# Patient Record
Sex: MALE | Race: WHITE | NOT HISPANIC OR LATINO | URBAN - METROPOLITAN AREA
[De-identification: names, ages, dates, MRNs, and addresses within clinical notes are randomized per-mention and may not be internally consistent; named-entity substitution may affect disease eponyms.]

---

## 2018-02-09 ENCOUNTER — EMERGENCY (EMERGENCY)
Facility: HOSPITAL | Age: 38
LOS: 1 days | Discharge: HOME | End: 2018-02-09
Admitting: EMERGENCY MEDICINE
Payer: COMMERCIAL

## 2018-02-09 VITALS
DIASTOLIC BLOOD PRESSURE: 65 MMHG | OXYGEN SATURATION: 100 % | SYSTOLIC BLOOD PRESSURE: 115 MMHG | HEART RATE: 96 BPM | RESPIRATION RATE: 18 BRPM

## 2018-02-09 VITALS
OXYGEN SATURATION: 100 % | TEMPERATURE: 100 F | SYSTOLIC BLOOD PRESSURE: 180 MMHG | HEART RATE: 130 BPM | DIASTOLIC BLOOD PRESSURE: 105 MMHG | RESPIRATION RATE: 18 BRPM

## 2018-02-09 DIAGNOSIS — F10.239 ALCOHOL DEPENDENCE WITH WITHDRAWAL, UNSPECIFIED: ICD-10-CM

## 2018-02-09 DIAGNOSIS — E87.6 HYPOKALEMIA: ICD-10-CM
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
## 2018-02-09 DIAGNOSIS — F17.200 NICOTINE DEPENDENCE, UNSPECIFIED, UNCOMPLICATED: ICD-10-CM

## 2018-02-09 LAB
ALBUMIN SERPL ELPH-MCNC: 3.2 G/DL — LOW (ref 3.4–5)
ALP SERPL-CCNC: 136 U/L — HIGH (ref 40–120)
ALT FLD-CCNC: 430 U/L — HIGH (ref 12–42)
ANION GAP SERPL CALC-SCNC: 9 MMOL/L — SIGNIFICANT CHANGE UP (ref 9–16)
AST SERPL-CCNC: 462 U/L — HIGH (ref 15–37)
BILIRUB SERPL-MCNC: 0.7 MG/DL — SIGNIFICANT CHANGE UP (ref 0.2–1.2)
BUN SERPL-MCNC: 19 MG/DL — SIGNIFICANT CHANGE UP (ref 7–23)
CALCIUM SERPL-MCNC: 9.2 MG/DL — SIGNIFICANT CHANGE UP (ref 8.5–10.5)
CHLORIDE SERPL-SCNC: 96 MMOL/L — SIGNIFICANT CHANGE UP (ref 96–108)
CO2 SERPL-SCNC: 33 MMOL/L — HIGH (ref 22–31)
CREAT SERPL-MCNC: 0.82 MG/DL — SIGNIFICANT CHANGE UP (ref 0.5–1.3)
ETHANOL SERPL-MCNC: <3 MG/DL — SIGNIFICANT CHANGE UP
GLUCOSE SERPL-MCNC: 116 MG/DL — HIGH (ref 70–99)
HCT VFR BLD CALC: 40 % — SIGNIFICANT CHANGE UP (ref 39–50)
HGB BLD-MCNC: 13.5 G/DL — SIGNIFICANT CHANGE UP (ref 13–17)
LIDOCAIN IGE QN: 98 U/L — SIGNIFICANT CHANGE UP (ref 73–393)
MAGNESIUM SERPL-MCNC: 2.1 MG/DL — SIGNIFICANT CHANGE UP (ref 1.6–2.6)
MAGNESIUM SERPL-MCNC: 2.1 MG/DL — SIGNIFICANT CHANGE UP (ref 1.6–2.6)
MCHC RBC-ENTMCNC: 27 PG — SIGNIFICANT CHANGE UP (ref 27–34)
MCHC RBC-ENTMCNC: 33.8 G/DL — SIGNIFICANT CHANGE UP (ref 32–36)
MCV RBC AUTO: 80 FL — SIGNIFICANT CHANGE UP (ref 80–100)
PCP SPEC-MCNC: SIGNIFICANT CHANGE UP
PLATELET # BLD AUTO: 150 K/UL — SIGNIFICANT CHANGE UP (ref 150–400)
POTASSIUM SERPL-MCNC: 2.7 MMOL/L — CRITICAL LOW (ref 3.5–5.3)
POTASSIUM SERPL-MCNC: 3.5 MMOL/L — SIGNIFICANT CHANGE UP (ref 3.5–5.3)
POTASSIUM SERPL-SCNC: 2.7 MMOL/L — CRITICAL LOW (ref 3.5–5.3)
POTASSIUM SERPL-SCNC: 3.5 MMOL/L — SIGNIFICANT CHANGE UP (ref 3.5–5.3)
PROT SERPL-MCNC: 7.6 G/DL — SIGNIFICANT CHANGE UP (ref 6.4–8.2)
RBC # BLD: 5 M/UL — SIGNIFICANT CHANGE UP (ref 4.2–5.8)
RBC # FLD: 13.9 % — SIGNIFICANT CHANGE UP (ref 10.3–16.9)
SODIUM SERPL-SCNC: 138 MMOL/L — SIGNIFICANT CHANGE UP (ref 132–145)
WBC # BLD: 7.8 K/UL — SIGNIFICANT CHANGE UP (ref 3.8–10.5)
WBC # FLD AUTO: 7.8 K/UL — SIGNIFICANT CHANGE UP (ref 3.8–10.5)

## 2018-02-09 PROCEDURE — 99284 EMERGENCY DEPT VISIT MOD MDM: CPT | Mod: 25

## 2018-02-09 RX ORDER — ONDANSETRON 8 MG/1
4 TABLET, FILM COATED ORAL ONCE
Qty: 0 | Refills: 0 | Status: COMPLETED | OUTPATIENT
Start: 2018-02-09 | End: 2018-02-09

## 2018-02-09 RX ORDER — POTASSIUM CHLORIDE 20 MEQ
60 PACKET (EA) ORAL ONCE
Qty: 0 | Refills: 0 | Status: COMPLETED | OUTPATIENT
Start: 2018-02-09 | End: 2018-02-09

## 2018-02-09 RX ORDER — SODIUM CHLORIDE 9 MG/ML
2000 INJECTION INTRAMUSCULAR; INTRAVENOUS; SUBCUTANEOUS ONCE
Qty: 0 | Refills: 0 | Status: COMPLETED | OUTPATIENT
Start: 2018-02-09 | End: 2018-02-09

## 2018-02-09 RX ADMIN — Medication 50 MILLIGRAM(S): at 08:52

## 2018-02-09 RX ADMIN — Medication 2 MILLIGRAM(S): at 08:52

## 2018-02-09 RX ADMIN — Medication 60 MILLIEQUIVALENT(S): at 09:36

## 2018-02-09 RX ADMIN — SODIUM CHLORIDE 4000 MILLILITER(S): 9 INJECTION INTRAMUSCULAR; INTRAVENOUS; SUBCUTANEOUS at 08:56

## 2018-02-09 RX ADMIN — ONDANSETRON 4 MILLIGRAM(S): 8 TABLET, FILM COATED ORAL at 08:52

## 2018-02-09 NOTE — ED ADULT NURSE NOTE - OBJECTIVE STATEMENT
Pt reports hx of alcohol abuse, states he drank over the weekend for approx 4 days straight, estimates approx 15 drinks per day. Began feeling shaky and having tremors, pt reports he drank 2 beers last night to help with tremors. Pt appears in no apparent distress, will continue to monitor

## 2018-02-09 NOTE — ED PROVIDER NOTE - OBJECTIVE STATEMENT
38 yo M with PMHx of alcohol withdrawal, 36 yo M with PMHx of alcohol withdrawal in the remote past, no DT noted, last drink this morning with 2 bottles of beers, presenting c/o nausea, tingling, and tremors x 1d.  Pt reports heavy alcohol consumption for the past 4d due to the superbowl weekend.  Noted feeling tremulous, on edge and anxious since yesterday.   Noted increased tingling sensation of his face with nausea sensation this morning. [place]ie like he was in withdrawal and had two bottles of beer in attempt to prevent worsening sx.  Denies AH/VH/delusion, HA, dizziness, CP, SOB, palpitations, V/D/C, abdominal pain, change in urinary/bowel function, tremors, focal weakness, and fever/chills.  No illicit drug use noted

## 2018-02-09 NOTE — ED PROVIDER NOTE - PHYSICAL EXAMINATION
Gen - NAD, comfortable in stretcher, non-toxic appearing, speaking in full sentences   Skin - warm, dry, intact   HEENT - AT/NC, PERRL, EOMI, no conjunctival injection, moist oral mucosa, o/p clear with no erythema, edema, or exudate, uvula midline, airway patent, neck supple and NT, FROM, no JVD or carotid bruits b/l, negative nystagmus   CV - S1S2, R/R/R  Resp - respiration non-labored, CTAB, symmetric bs b/l, no r/r/w  GI - NABS, soft, ND, NT, no rebound or guarding, no CVAT b/l   MS - w/w/p, no c/c/e, calves supple and NT, distal pulses symmetric b/l   Neuro - AxOx3, no focal neuro deficits, CN II-XII grossly intact, cerebellar function intact, negative pronator drift, negative nystagmus, ambulatory without gait disturbance Gen - NAD, comfortable in stretcher, non-toxic appearing, speaking in full sentences   Skin - warm, dry, intact   HEENT - AT/NC, PERRL, EOMI, no conjunctival injection, moist oral mucosa, +tongue fasciculation, o/p clear with no erythema, edema, or exudate, uvula midline, airway patent, neck supple and NT, FROM  CV - S1S2, R/R/R  Resp - respiration non-labored, CTAB, symmetric bs b/l, no r/r/w  GI - NABS, soft, ND, NT, no rebound or guarding, no CVAT b/l   MS - w/w/p, no c/c/e, calves supple and NT, distal pulses symmetric b/l   Neuro - AxOx3, no focal neuro deficits, CN II-XII grossly intact, cerebellar function intact, negative pronator drift, negative nystagmus, ambulatory without gait disturbance

## 2018-02-09 NOTE — SBIRT NOTE. - NSSBIRTSERVICES_GEN_A_ED_FT
Provided SBIRT services: Full screen positive. Referral to Treatment attempted. Screening results were reviewed with the patient and patient was provided information about healthy guidelines and potential negative consequences associated with level of risk. Motivation and readiness to reduce or stop use was discussed and goals and activities to make changes were suggested/offered.  Options discussed for further evaluation and treatment, but referral to treatment was not completed because Patient refused.  Audit Score:22  DAST Score:0  Duration = 20 Minutes

## 2018-02-09 NOTE — ED ADULT TRIAGE NOTE - CHIEF COMPLAINT QUOTE
BIBEMS, ambulatory, complaining of tremors. Pt states he woke up 2am this morning with generalized tremors; drank two bottles of beer thereafter. Reports hx of alcohol abuse.

## 2018-11-21 ENCOUNTER — EMERGENCY (EMERGENCY)
Facility: HOSPITAL | Age: 38
LOS: 1 days | Discharge: HOME | End: 2018-11-21
Attending: EMERGENCY MEDICINE | Admitting: EMERGENCY MEDICINE
Payer: COMMERCIAL

## 2018-11-21 VITALS
RESPIRATION RATE: 20 BRPM | OXYGEN SATURATION: 98 % | HEART RATE: 118 BPM | DIASTOLIC BLOOD PRESSURE: 132 MMHG | TEMPERATURE: 98 F | SYSTOLIC BLOOD PRESSURE: 191 MMHG

## 2018-11-21 VITALS
HEART RATE: 92 BPM | RESPIRATION RATE: 18 BRPM | DIASTOLIC BLOOD PRESSURE: 93 MMHG | SYSTOLIC BLOOD PRESSURE: 141 MMHG | OXYGEN SATURATION: 97 % | TEMPERATURE: 98 F

## 2018-11-21 DIAGNOSIS — Z79.899 OTHER LONG TERM (CURRENT) DRUG THERAPY: ICD-10-CM

## 2018-11-21 DIAGNOSIS — F10.230 ALCOHOL DEPENDENCE WITH WITHDRAWAL, UNCOMPLICATED: ICD-10-CM

## 2018-11-21 DIAGNOSIS — R42 DIZZINESS AND GIDDINESS: ICD-10-CM

## 2018-11-21 DIAGNOSIS — Z87.891 PERSONAL HISTORY OF NICOTINE DEPENDENCE: ICD-10-CM

## 2018-11-21 DIAGNOSIS — I10 ESSENTIAL (PRIMARY) HYPERTENSION: ICD-10-CM

## 2018-11-21 PROBLEM — F10.239 ALCOHOL DEPENDENCE WITH WITHDRAWAL, UNSPECIFIED: Chronic | Status: ACTIVE | Noted: 2018-02-09

## 2018-11-21 PROBLEM — Z00.00 ENCOUNTER FOR PREVENTIVE HEALTH EXAMINATION: Status: ACTIVE | Noted: 2018-11-21

## 2018-11-21 LAB
ALBUMIN SERPL ELPH-MCNC: 3.6 G/DL — SIGNIFICANT CHANGE UP (ref 3.4–5)
ALP SERPL-CCNC: 126 U/L — HIGH (ref 40–120)
ALT FLD-CCNC: 240 U/L — HIGH (ref 12–42)
ANION GAP SERPL CALC-SCNC: 1 MMOL/L — LOW (ref 9–16)
APPEARANCE UR: CLEAR — SIGNIFICANT CHANGE UP
AST SERPL-CCNC: 272 U/L — HIGH (ref 15–37)
BILIRUB SERPL-MCNC: 0.6 MG/DL — SIGNIFICANT CHANGE UP (ref 0.2–1.2)
BILIRUB UR-MCNC: NEGATIVE — SIGNIFICANT CHANGE UP
BUN SERPL-MCNC: 17 MG/DL — SIGNIFICANT CHANGE UP (ref 7–23)
CALCIUM SERPL-MCNC: 9.6 MG/DL — SIGNIFICANT CHANGE UP (ref 8.5–10.5)
CHLORIDE SERPL-SCNC: 99 MMOL/L — SIGNIFICANT CHANGE UP (ref 96–108)
CO2 SERPL-SCNC: 36 MMOL/L — HIGH (ref 22–31)
COLOR SPEC: YELLOW — SIGNIFICANT CHANGE UP
CREAT SERPL-MCNC: 0.78 MG/DL — SIGNIFICANT CHANGE UP (ref 0.5–1.3)
DIFF PNL FLD: NEGATIVE — SIGNIFICANT CHANGE UP
ETHANOL SERPL-MCNC: <3 MG/DL — SIGNIFICANT CHANGE UP
GLUCOSE SERPL-MCNC: 114 MG/DL — HIGH (ref 70–99)
GLUCOSE UR QL: NEGATIVE — SIGNIFICANT CHANGE UP
HCT VFR BLD CALC: 41.9 % — SIGNIFICANT CHANGE UP (ref 39–50)
HGB BLD-MCNC: 14 G/DL — SIGNIFICANT CHANGE UP (ref 13–17)
KETONES UR-MCNC: NEGATIVE — SIGNIFICANT CHANGE UP
LEUKOCYTE ESTERASE UR-ACNC: NEGATIVE — SIGNIFICANT CHANGE UP
MAGNESIUM SERPL-MCNC: 1.7 MG/DL — SIGNIFICANT CHANGE UP (ref 1.6–2.6)
MCHC RBC-ENTMCNC: 27.3 PG — SIGNIFICANT CHANGE UP (ref 27–34)
MCHC RBC-ENTMCNC: 33.4 G/DL — SIGNIFICANT CHANGE UP (ref 32–36)
MCV RBC AUTO: 81.8 FL — SIGNIFICANT CHANGE UP (ref 80–100)
NITRITE UR-MCNC: NEGATIVE — SIGNIFICANT CHANGE UP
PCP SPEC-MCNC: SIGNIFICANT CHANGE UP
PH UR: 7.5 — SIGNIFICANT CHANGE UP (ref 5–8)
PLATELET # BLD AUTO: 191 K/UL — SIGNIFICANT CHANGE UP (ref 150–400)
POTASSIUM SERPL-MCNC: 3 MMOL/L — LOW (ref 3.5–5.3)
POTASSIUM SERPL-SCNC: 3 MMOL/L — LOW (ref 3.5–5.3)
PROT SERPL-MCNC: 7.7 G/DL — SIGNIFICANT CHANGE UP (ref 6.4–8.2)
PROT UR-MCNC: NEGATIVE MG/DL — SIGNIFICANT CHANGE UP
RBC # BLD: 5.12 M/UL — SIGNIFICANT CHANGE UP (ref 4.2–5.8)
RBC # FLD: 14.9 % — HIGH (ref 10.3–14.5)
SODIUM SERPL-SCNC: 136 MMOL/L — SIGNIFICANT CHANGE UP (ref 132–145)
SP GR SPEC: 1.01 — SIGNIFICANT CHANGE UP (ref 1–1.03)
UROBILINOGEN FLD QL: 0.2 E.U./DL — SIGNIFICANT CHANGE UP
WBC # BLD: 9.8 K/UL — SIGNIFICANT CHANGE UP (ref 3.8–10.5)
WBC # FLD AUTO: 9.8 K/UL — SIGNIFICANT CHANGE UP (ref 3.8–10.5)

## 2018-11-21 PROCEDURE — 99284 EMERGENCY DEPT VISIT MOD MDM: CPT | Mod: 25

## 2018-11-21 PROCEDURE — 93010 ELECTROCARDIOGRAM REPORT: CPT | Mod: NC

## 2018-11-21 RX ORDER — AMLODIPINE BESYLATE 2.5 MG/1
5 TABLET ORAL ONCE
Qty: 0 | Refills: 0 | Status: COMPLETED | OUTPATIENT
Start: 2018-11-21 | End: 2018-11-21

## 2018-11-21 RX ORDER — SODIUM CHLORIDE 9 MG/ML
1000 INJECTION INTRAMUSCULAR; INTRAVENOUS; SUBCUTANEOUS ONCE
Qty: 0 | Refills: 0 | Status: COMPLETED | OUTPATIENT
Start: 2018-11-21 | End: 2018-11-21

## 2018-11-21 RX ORDER — POTASSIUM CHLORIDE 20 MEQ
40 PACKET (EA) ORAL ONCE
Qty: 0 | Refills: 0 | Status: COMPLETED | OUTPATIENT
Start: 2018-11-21 | End: 2018-11-21

## 2018-11-21 RX ADMIN — Medication 2 MILLIGRAM(S): at 09:02

## 2018-11-21 RX ADMIN — Medication 40 MILLIEQUIVALENT(S): at 09:02

## 2018-11-21 RX ADMIN — Medication 50 MILLIGRAM(S): at 07:23

## 2018-11-21 RX ADMIN — AMLODIPINE BESYLATE 5 MILLIGRAM(S): 2.5 TABLET ORAL at 09:02

## 2018-11-21 RX ADMIN — SODIUM CHLORIDE 2000 MILLILITER(S): 9 INJECTION INTRAMUSCULAR; INTRAVENOUS; SUBCUTANEOUS at 09:03

## 2018-11-21 NOTE — SBIRT NOTE. - NSSBIRTSERVICES_GEN_A_ED_FT
Provided SBIRT services. Full Screen Positive. Brief Intervention Performed. Screening results were reviewed with the patient and patient was provided information about healthy guidelines and potential negative consequences associated with level of risk.   Motivation and readiness to reduce or stop use was discussed and goals and activities to make changes were suggested/offered.    Audit Score : 12    Dast Score :0    Duration : 15 minutes

## 2018-11-21 NOTE — ED PROVIDER NOTE - OBJECTIVE STATEMENT
36 y/o male with PMHx of alcoholism (sober since February 2018) presents to ED c/o alcohol withdrawal. Patient reports he was pressured into having some drinks 7 days ago which started a 5-day long binge that ended on Sunday. States he felt fine on Monday and Tuesday, but awoke this morning with nervousness, anxiety and tremors, and came in to the ER for an evaluation. Also reports dizziness, palpitations, and general malaise. He reports that he quit drinking with an outpatient program successfully on the first attempt. On arrival, BP was elevated, and patient admits to hx of HTN but is non-compliant with the meds Rxed to him.

## 2018-11-21 NOTE — ED PROVIDER NOTE - PROGRESS NOTE DETAILS
Patient reports feeling much better.  BP improved.  No tremor or tongue fasciculations.  Patient states he is supposed to take BP meds but hasn't been following up.  Requests a referral.  Appt made for cardiology for BP management and cardiac risk stratification secondary to risk factors.  SBIRT evaluated patient, outpatient referrals given.  Patient has had good luck with this in the past and has good contact with support system.

## 2018-11-21 NOTE — ED PROVIDER NOTE - RAPID ASSESSMENT
walk in patient with complaints of palpitations, night sweats, insomnia, nervousness this morning. Patient endorses he was a recovering alcoholic and fell of the wagon this past wednesday. Admits to drinking large amounts of etoh daily from wednesday to sunday. Has not had a drink in 3 days. Patient is hypertensive and tachycardic at triage. no tongue fasciculations or tremors

## 2018-11-21 NOTE — ED ADULT TRIAGE NOTE - CHIEF COMPLAINT QUOTE
walk in patient with complaints of palpitations, night sweats, insomnia, nervousness this morning. Patient endorses he was a recovering alcoholic and fell of the wagon this past wednesday. Admits to drinking large amounts of etoh daily from wednesday to sunday. Has not had a drink in 3 days. Patient is hypertensive and tachycardic at triage with mild tremors.

## 2018-11-21 NOTE — ED ADULT NURSE NOTE - NSIMPLEMENTINTERV_GEN_ALL_ED
Implemented All Universal Safety Interventions:  [place] to call system. Call bell, personal items and telephone within reach. Instruct patient to call for assistance. Room bathroom lighting operational. Non-slip footwear when patient is off stretcher. Physically safe environment: no spills, clutter or unnecessary equipment. Stretcher in lowest position, wheels locked, appropriate side rails in place.

## 2018-11-21 NOTE — ED PROVIDER NOTE - CONSTITUTIONAL, MLM
normal... Alert, oriented to person, place, time/situation and in no apparent distress. Mildly anxious but not tremulous, awake and conversant.

## 2018-11-21 NOTE — ED PROVIDER NOTE - MEDICAL DECISION MAKING DETAILS
Patient presented for mild withdrawal (anxious, shakey, nervous) now 2 days after quitting alcohol 2 days ago after a 5 day binge with 9 months dry previously.  Patient treated, hydrated, K replaced, will d/c patient home with outpatient resources and Librium.  Cardiology f/u arranged.  Importance of close followup and precautions for immediate return discussed.

## 2018-11-21 NOTE — ED PROVIDER NOTE - CARE PROVIDER_API CALL
Alfonzo Alonso), Cardiovascular Disease; Internal Medicine  [address] [facility]  [address] [facility] [address]  Phone: [phone]  Fax: [phone]

## 2018-11-21 NOTE — ED PROVIDER NOTE - NSFOLLOWUPINSTRUCTIONS_ED_ALL_ED_FT
Alcohol Abuse    Alcohol intoxication occurs when the amount of alcohol that a person has consumed impairs his or her ability to mentally and physically function. Chronic alcohol consumption can also lead to a variety of health issues including neurological disease, stomach disease, heart disease, liver disease, etc. Do not drive after drinking alcohol. Drinking enough alcohol to end up in an Emergency Room suggests you may have an alcohol abuse problem. Seek help at a drug addiction center.    SEEK IMMEDIATE MEDICAL CARE IF YOU HAVE ANY OF THE FOLLOWING SYMPTOMS: seizures, vomiting blood, blood in your stool, lightheadedness/dizziness, or becoming shaky to tremulous when you stop drinking.     Take medication as directed.    Follow up with your group and outpatient resources as discussed.  Your potassium is a little low.  Consider taking a multivitamin.  Please return immediately if you have any problems or concerns at all.

## 2018-11-28 ENCOUNTER — APPOINTMENT (OUTPATIENT)
Dept: HEART AND VASCULAR | Facility: CLINIC | Age: 38
End: 2018-11-28

## 2019-12-07 ENCOUNTER — EMERGENCY (EMERGENCY)
Facility: HOSPITAL | Age: 39
LOS: 1 days | Discharge: HOME | End: 2019-12-07
Attending: EMERGENCY MEDICINE | Admitting: EMERGENCY MEDICINE
Payer: COMMERCIAL

## 2019-12-07 VITALS
RESPIRATION RATE: 18 BRPM | DIASTOLIC BLOOD PRESSURE: 79 MMHG | HEART RATE: 86 BPM | SYSTOLIC BLOOD PRESSURE: 152 MMHG | TEMPERATURE: 98 F | OXYGEN SATURATION: 96 %

## 2019-12-07 VITALS
HEIGHT: 68 IN | HEART RATE: 127 BPM | TEMPERATURE: 98 F | RESPIRATION RATE: 18 BRPM | OXYGEN SATURATION: 98 % | DIASTOLIC BLOOD PRESSURE: 128 MMHG | WEIGHT: 229.94 LBS | SYSTOLIC BLOOD PRESSURE: 162 MMHG

## 2019-12-07 PROBLEM — F10.20 ALCOHOL DEPENDENCE, UNCOMPLICATED: Chronic | Status: ACTIVE | Noted: 2018-11-21

## 2019-12-07 LAB
ALBUMIN SERPL ELPH-MCNC: 3.5 G/DL — SIGNIFICANT CHANGE UP (ref 3.4–5)
ALP SERPL-CCNC: 123 U/L — HIGH (ref 40–120)
ALT FLD-CCNC: 51 U/L — HIGH (ref 12–42)
ANION GAP SERPL CALC-SCNC: 11 MMOL/L — SIGNIFICANT CHANGE UP (ref 9–16)
ANISOCYTOSIS BLD QL: SLIGHT — SIGNIFICANT CHANGE UP
AST SERPL-CCNC: 46 U/L — HIGH (ref 15–37)
BASOPHILS # BLD AUTO: 0 K/UL — SIGNIFICANT CHANGE UP (ref 0–0.2)
BASOPHILS NFR BLD AUTO: 0 % — SIGNIFICANT CHANGE UP (ref 0–2)
BILIRUB SERPL-MCNC: 0.3 MG/DL — SIGNIFICANT CHANGE UP (ref 0.2–1.2)
BUN SERPL-MCNC: 6 MG/DL — LOW (ref 7–23)
CALCIUM SERPL-MCNC: 9.1 MG/DL — SIGNIFICANT CHANGE UP (ref 8.5–10.5)
CHLORIDE SERPL-SCNC: 99 MMOL/L — SIGNIFICANT CHANGE UP (ref 96–108)
CO2 SERPL-SCNC: 29 MMOL/L — SIGNIFICANT CHANGE UP (ref 22–31)
CREAT SERPL-MCNC: 0.82 MG/DL — SIGNIFICANT CHANGE UP (ref 0.5–1.3)
EOSINOPHIL # BLD AUTO: 0.27 K/UL — SIGNIFICANT CHANGE UP (ref 0–0.5)
EOSINOPHIL NFR BLD AUTO: 2 % — SIGNIFICANT CHANGE UP (ref 0–6)
ETHANOL SERPL-MCNC: 176 MG/DL — HIGH
GLUCOSE SERPL-MCNC: 113 MG/DL — HIGH (ref 70–99)
HCT VFR BLD CALC: 47.8 % — SIGNIFICANT CHANGE UP (ref 39–50)
HGB BLD-MCNC: 16.1 G/DL — SIGNIFICANT CHANGE UP (ref 13–17)
LG PLATELETS BLD QL AUTO: SLIGHT — SIGNIFICANT CHANGE UP
LYMPHOCYTES # BLD AUTO: 26 % — SIGNIFICANT CHANGE UP (ref 13–44)
LYMPHOCYTES # BLD AUTO: 3.57 K/UL — HIGH (ref 1–3.3)
MACROCYTES BLD QL: SLIGHT — SIGNIFICANT CHANGE UP
MAGNESIUM SERPL-MCNC: 2 MG/DL — SIGNIFICANT CHANGE UP (ref 1.6–2.6)
MANUAL SMEAR VERIFICATION: SIGNIFICANT CHANGE UP
MCHC RBC-ENTMCNC: 27.3 PG — SIGNIFICANT CHANGE UP (ref 27–34)
MCHC RBC-ENTMCNC: 33.7 GM/DL — SIGNIFICANT CHANGE UP (ref 32–36)
MCV RBC AUTO: 81.2 FL — SIGNIFICANT CHANGE UP (ref 80–100)
MONOCYTES # BLD AUTO: 1.37 K/UL — HIGH (ref 0–0.9)
MONOCYTES NFR BLD AUTO: 10 % — SIGNIFICANT CHANGE UP (ref 2–14)
NEUTROPHILS # BLD AUTO: 5.36 K/UL — SIGNIFICANT CHANGE UP (ref 1.8–7.4)
NEUTROPHILS NFR BLD AUTO: 39 % — LOW (ref 43–77)
NRBC # BLD: 0 /100 — SIGNIFICANT CHANGE UP (ref 0–0)
NRBC # BLD: SIGNIFICANT CHANGE UP /100 WBCS (ref 0–0)
PLAT MORPH BLD: NORMAL — SIGNIFICANT CHANGE UP
PLATELET # BLD AUTO: 334 K/UL — SIGNIFICANT CHANGE UP (ref 150–400)
PLATELET COUNT - ESTIMATE: NORMAL — SIGNIFICANT CHANGE UP
POLYCHROMASIA BLD QL SMEAR: SLIGHT — SIGNIFICANT CHANGE UP
POTASSIUM SERPL-MCNC: 3.5 MMOL/L — SIGNIFICANT CHANGE UP (ref 3.5–5.3)
POTASSIUM SERPL-SCNC: 3.5 MMOL/L — SIGNIFICANT CHANGE UP (ref 3.5–5.3)
PROT SERPL-MCNC: 8.3 G/DL — HIGH (ref 6.4–8.2)
RBC # BLD: 5.89 M/UL — HIGH (ref 4.2–5.8)
RBC # FLD: 15.3 % — HIGH (ref 10.3–14.5)
RBC BLD AUTO: ABNORMAL
SODIUM SERPL-SCNC: 139 MMOL/L — SIGNIFICANT CHANGE UP (ref 132–145)
VARIANT LYMPHS # BLD: 23 % — HIGH (ref 0–6)
WBC # BLD: 13.74 K/UL — HIGH (ref 3.8–10.5)
WBC # FLD AUTO: 13.74 K/UL — HIGH (ref 3.8–10.5)

## 2019-12-07 PROCEDURE — 93010 ELECTROCARDIOGRAM REPORT: CPT

## 2019-12-07 PROCEDURE — 99218: CPT

## 2019-12-07 RX ORDER — SODIUM CHLORIDE 9 MG/ML
1000 INJECTION INTRAMUSCULAR; INTRAVENOUS; SUBCUTANEOUS ONCE
Refills: 0 | Status: COMPLETED | OUTPATIENT
Start: 2019-12-07 | End: 2019-12-07

## 2019-12-07 RX ORDER — SODIUM CHLORIDE 9 MG/ML
2000 INJECTION INTRAMUSCULAR; INTRAVENOUS; SUBCUTANEOUS ONCE
Refills: 0 | Status: COMPLETED | OUTPATIENT
Start: 2019-12-07 | End: 2019-12-07

## 2019-12-07 RX ADMIN — SODIUM CHLORIDE 2000 MILLILITER(S): 9 INJECTION INTRAMUSCULAR; INTRAVENOUS; SUBCUTANEOUS at 17:32

## 2019-12-07 RX ADMIN — Medication 1 MILLIGRAM(S): at 16:07

## 2019-12-07 RX ADMIN — Medication 25 MILLIGRAM(S): at 20:11

## 2019-12-07 RX ADMIN — SODIUM CHLORIDE 1000 MILLILITER(S): 9 INJECTION INTRAMUSCULAR; INTRAVENOUS; SUBCUTANEOUS at 16:06

## 2019-12-07 RX ADMIN — Medication 50 MILLIGRAM(S): at 20:53

## 2019-12-07 RX ADMIN — Medication 1 MILLIGRAM(S): at 20:12

## 2019-12-07 RX ADMIN — Medication 50 MILLIGRAM(S): at 17:33

## 2019-12-07 RX ADMIN — Medication 50 MILLIGRAM(S): at 16:07

## 2019-12-07 RX ADMIN — Medication 1 MILLIGRAM(S): at 17:33

## 2019-12-07 NOTE — ED CDU PROVIDER INITIAL DAY NOTE - OBJECTIVE STATEMENT
Pt presents to ED for etoh withdrawal after binge drinking x7 days. See ED provider note for full HPI and physical exam.

## 2019-12-07 NOTE — ED ADULT NURSE NOTE - OBJECTIVE STATEMENT
Pt presents to ED c/o tremors, anxiety and difficulty sleeping s/p binge drinking x7days. Pt has hx of ETOH abuse, was 400 days sober prior. Pt reports recent stressors with girlfriend that provoked drinking. Denies any drug use. Denies any tactile hallucinations, no N/V/D. Pt ambulating with steady gait.

## 2019-12-07 NOTE — ED PROVIDER NOTE - OBJECTIVE STATEMENT
39 y o male with Hx of alcohol abuse and HTN presents to ED for withdrawal sx. Reports he was 400 days sober but after breaking up with his girlfriend, he has been binging on alcohol for the past 7 days. Pt endorsees tremors, anxiety, and difficulty sleeping. Feels dehydrated.  Denies any N/V/D. No hallucinations. Last had alcohol 12 hours ago. Admits to 2 bottles of wine and a 6 pack of beer a day.

## 2019-12-07 NOTE — ED PROVIDER NOTE - PROGRESS NOTE DETAILS
Electrolytes returned normal. Mild transaminitis, consistent with alcohol drinking. Will obs for benzo dosing and hydration for withdrawal sx.

## 2019-12-07 NOTE — ED CDU PROVIDER INITIAL DAY NOTE - MEDICAL DECISION MAKING DETAILS
Pt presents to ED for etoh withdrawal after binge drinking x7 days. See ED provider note for full HPI and physical exam. Will obs for benzo dosing and hydration for alcohol withdrawal symptoms.

## 2019-12-07 NOTE — ED PROVIDER NOTE - MUSCULOSKELETAL, MLM
Spine appears normal, range of motion is not limited, no muscle or joint tenderness. +Slight UE tremors.

## 2019-12-07 NOTE — ED ADULT TRIAGE NOTE - CHIEF COMPLAINT QUOTE
Pt presents to ED c/o withdrawal symptoms. Pt reports x400 days sober, breaking up with girlfriend x7days and has been on an alcohol binge every since. Pt now reports tremors, anxiety and difficulty sleeping. Denies any N/V/D, no tactile hallucinations. Last drink 0400.

## 2019-12-07 NOTE — ED CDU PROVIDER DISPOSITION NOTE - PATIENT PORTAL LINK FT
You can access the FollowMyHealth Patient Portal offered by [facility] by registering at the following website: [email or web address]. By joining [employer]’s FollowMyHealth portal, you will also be able to view your health information using other applications (apps) compatible with our system.

## 2019-12-07 NOTE — ED CDU PROVIDER DISPOSITION NOTE - CLINICAL COURSE
Pt treated w benzos in ED, asymptomatic now, looks well, will prescribe short course of librium, which he has responded well to in the past. no tremors, no headache, steady gait. Instructed to call his sponsor and [facility] if any worsening.

## 2019-12-07 NOTE — ED CDU PROVIDER INITIAL DAY NOTE - DETAILS
Pt presents to ED for withdrawal sx. Will place on obs and treat for sx. Will obs for benzo dosing and hydration for alcohol withdrawal symptoms.

## 2019-12-07 NOTE — ED ADULT NURSE NOTE - CHPI ED NUR SYMPTOMS NEG
no confusion/no chills/no vomiting/no weakness/no abdominal pain/no abdominal distension/no nausea/no fever/no pain/no disorientation

## 2019-12-10 LAB — MANUAL DIF COMMENT BLD-IMP: SIGNIFICANT CHANGE UP

## 2019-12-15 DIAGNOSIS — F10.239 ALCOHOL DEPENDENCE WITH WITHDRAWAL, UNSPECIFIED: ICD-10-CM

## 2020-03-08 ENCOUNTER — EMERGENCY (EMERGENCY)
Facility: HOSPITAL | Age: 40
LOS: 1 days | Discharge: HOME | End: 2020-03-08
Admitting: EMERGENCY MEDICINE
Payer: COMMERCIAL

## 2020-03-08 VITALS
HEART RATE: 141 BPM | TEMPERATURE: 98 F | HEIGHT: 67 IN | OXYGEN SATURATION: 97 % | RESPIRATION RATE: 18 BRPM | DIASTOLIC BLOOD PRESSURE: 102 MMHG | SYSTOLIC BLOOD PRESSURE: 158 MMHG | WEIGHT: 235.01 LBS

## 2020-03-08 VITALS
SYSTOLIC BLOOD PRESSURE: 140 MMHG | DIASTOLIC BLOOD PRESSURE: 88 MMHG | RESPIRATION RATE: 18 BRPM | HEART RATE: 105 BPM | OXYGEN SATURATION: 99 %

## 2020-03-08 DIAGNOSIS — I10 ESSENTIAL (PRIMARY) HYPERTENSION: ICD-10-CM

## 2020-03-08 DIAGNOSIS — F10.10 ALCOHOL ABUSE, UNCOMPLICATED: ICD-10-CM

## 2020-03-08 LAB
ALBUMIN SERPL ELPH-MCNC: 3.7 G/DL — SIGNIFICANT CHANGE UP (ref 3.4–5)
ALP SERPL-CCNC: 107 U/L — SIGNIFICANT CHANGE UP (ref 40–120)
ALT FLD-CCNC: 65 U/L — HIGH (ref 12–42)
ANION GAP SERPL CALC-SCNC: 15 MMOL/L — SIGNIFICANT CHANGE UP (ref 9–16)
APAP SERPL-MCNC: <2 UG/ML — LOW (ref 10–30)
APPEARANCE UR: CLEAR — SIGNIFICANT CHANGE UP
AST SERPL-CCNC: 75 U/L — HIGH (ref 15–37)
BASOPHILS # BLD AUTO: 0.04 K/UL — SIGNIFICANT CHANGE UP (ref 0–0.2)
BASOPHILS NFR BLD AUTO: 0.4 % — SIGNIFICANT CHANGE UP (ref 0–2)
BILIRUB SERPL-MCNC: 0.6 MG/DL — SIGNIFICANT CHANGE UP (ref 0.2–1.2)
BILIRUB UR-MCNC: NEGATIVE — SIGNIFICANT CHANGE UP
BUN SERPL-MCNC: 11 MG/DL — SIGNIFICANT CHANGE UP (ref 7–23)
CALCIUM SERPL-MCNC: 8 MG/DL — LOW (ref 8.5–10.5)
CHLORIDE SERPL-SCNC: 96 MMOL/L — SIGNIFICANT CHANGE UP (ref 96–108)
CO2 SERPL-SCNC: 25 MMOL/L — SIGNIFICANT CHANGE UP (ref 22–31)
COLOR SPEC: YELLOW — SIGNIFICANT CHANGE UP
CREAT SERPL-MCNC: 0.77 MG/DL — SIGNIFICANT CHANGE UP (ref 0.5–1.3)
DIFF PNL FLD: NEGATIVE — SIGNIFICANT CHANGE UP
EOSINOPHIL # BLD AUTO: 0.15 K/UL — SIGNIFICANT CHANGE UP (ref 0–0.5)
EOSINOPHIL NFR BLD AUTO: 1.7 % — SIGNIFICANT CHANGE UP (ref 0–6)
ETHANOL SERPL-MCNC: 323 MG/DL — HIGH
GLUCOSE BLDC GLUCOMTR-MCNC: 119 MG/DL — HIGH (ref 70–99)
GLUCOSE SERPL-MCNC: 119 MG/DL — HIGH (ref 70–99)
GLUCOSE UR QL: NEGATIVE — SIGNIFICANT CHANGE UP
HCT VFR BLD CALC: 47.3 % — SIGNIFICANT CHANGE UP (ref 39–50)
HGB BLD-MCNC: 16.4 G/DL — SIGNIFICANT CHANGE UP (ref 13–17)
IMM GRANULOCYTES NFR BLD AUTO: 0.2 % — SIGNIFICANT CHANGE UP (ref 0–1.5)
KETONES UR-MCNC: ABNORMAL MG/DL
LEUKOCYTE ESTERASE UR-ACNC: NEGATIVE — SIGNIFICANT CHANGE UP
LIDOCAIN IGE QN: 101 U/L — SIGNIFICANT CHANGE UP (ref 73–393)
LYMPHOCYTES # BLD AUTO: 4.84 K/UL — HIGH (ref 1–3.3)
LYMPHOCYTES # BLD AUTO: 53.8 % — HIGH (ref 13–44)
MAGNESIUM SERPL-MCNC: 2 MG/DL — SIGNIFICANT CHANGE UP (ref 1.6–2.6)
MCHC RBC-ENTMCNC: 28.1 PG — SIGNIFICANT CHANGE UP (ref 27–34)
MCHC RBC-ENTMCNC: 34.7 GM/DL — SIGNIFICANT CHANGE UP (ref 32–36)
MCV RBC AUTO: 81 FL — SIGNIFICANT CHANGE UP (ref 80–100)
MONOCYTES # BLD AUTO: 0.55 K/UL — SIGNIFICANT CHANGE UP (ref 0–0.9)
MONOCYTES NFR BLD AUTO: 6.1 % — SIGNIFICANT CHANGE UP (ref 2–14)
NEUTROPHILS # BLD AUTO: 3.39 K/UL — SIGNIFICANT CHANGE UP (ref 1.8–7.4)
NEUTROPHILS NFR BLD AUTO: 37.8 % — LOW (ref 43–77)
NITRITE UR-MCNC: NEGATIVE — SIGNIFICANT CHANGE UP
NRBC # BLD: 0 /100 WBCS — SIGNIFICANT CHANGE UP (ref 0–0)
PH UR: 6.5 — SIGNIFICANT CHANGE UP (ref 5–8)
PLATELET # BLD AUTO: 268 K/UL — SIGNIFICANT CHANGE UP (ref 150–400)
POTASSIUM SERPL-MCNC: 3.4 MMOL/L — LOW (ref 3.5–5.3)
POTASSIUM SERPL-SCNC: 3.4 MMOL/L — LOW (ref 3.5–5.3)
PROT SERPL-MCNC: 8.3 G/DL — HIGH (ref 6.4–8.2)
PROT UR-MCNC: NEGATIVE MG/DL — SIGNIFICANT CHANGE UP
RBC # BLD: 5.84 M/UL — HIGH (ref 4.2–5.8)
RBC # FLD: 15.2 % — HIGH (ref 10.3–14.5)
SALICYLATES SERPL-MCNC: 1.6 MG/DL — LOW (ref 2.8–20)
SODIUM SERPL-SCNC: 136 MMOL/L — SIGNIFICANT CHANGE UP (ref 132–145)
SP GR SPEC: 1.01 — SIGNIFICANT CHANGE UP (ref 1–1.03)
UROBILINOGEN FLD QL: 0.2 E.U./DL — SIGNIFICANT CHANGE UP
WBC # BLD: 8.99 K/UL — SIGNIFICANT CHANGE UP (ref 3.8–10.5)
WBC # FLD AUTO: 8.99 K/UL — SIGNIFICANT CHANGE UP (ref 3.8–10.5)

## 2020-03-08 PROCEDURE — 71045 X-RAY EXAM CHEST 1 VIEW: CPT | Mod: 26

## 2020-03-08 PROCEDURE — 93010 ELECTROCARDIOGRAM REPORT: CPT | Mod: NC

## 2020-03-08 PROCEDURE — 99284 EMERGENCY DEPT VISIT MOD MDM: CPT | Mod: 25

## 2020-03-08 RX ORDER — AMLODIPINE BESYLATE 2.5 MG/1
1 TABLET ORAL
Qty: 28 | Refills: 0
Start: 2020-03-08 | End: 2020-04-04

## 2020-03-08 RX ORDER — POTASSIUM CHLORIDE 20 MEQ
40 PACKET (EA) ORAL ONCE
Refills: 0 | Status: COMPLETED | OUTPATIENT
Start: 2020-03-08 | End: 2020-03-08

## 2020-03-08 RX ORDER — AMLODIPINE BESYLATE 2.5 MG/1
10 TABLET ORAL ONCE
Refills: 0 | Status: COMPLETED | OUTPATIENT
Start: 2020-03-08 | End: 2020-03-08

## 2020-03-08 RX ORDER — SODIUM CHLORIDE 9 MG/ML
1000 INJECTION INTRAMUSCULAR; INTRAVENOUS; SUBCUTANEOUS ONCE
Refills: 0 | Status: COMPLETED | OUTPATIENT
Start: 2020-03-08 | End: 2020-03-08

## 2020-03-08 RX ADMIN — AMLODIPINE BESYLATE 10 MILLIGRAM(S): 2.5 TABLET ORAL at 18:50

## 2020-03-08 RX ADMIN — Medication 40 MILLIEQUIVALENT(S): at 17:56

## 2020-03-08 RX ADMIN — SODIUM CHLORIDE 1000 MILLILITER(S): 9 INJECTION INTRAMUSCULAR; INTRAVENOUS; SUBCUTANEOUS at 15:34

## 2020-03-08 RX ADMIN — SODIUM CHLORIDE 1000 MILLILITER(S): 9 INJECTION INTRAMUSCULAR; INTRAVENOUS; SUBCUTANEOUS at 16:54

## 2020-03-08 RX ADMIN — Medication 1 MILLIGRAM(S): at 15:33

## 2020-03-08 RX ADMIN — Medication 50 MILLIGRAM(S): at 17:56

## 2020-03-08 RX ADMIN — Medication 50 MILLIGRAM(S): at 20:34

## 2020-03-08 NOTE — ED PROVIDER NOTE - DIAGNOSTIC INTERPRETATION
Interpreted by ED ASHUTOSH Richards  chest x-ray 1 view  Lungs clear, heart shadow normal, bony structures normal, no free air under diaphragm, no PTX

## 2020-03-08 NOTE — ED PROVIDER NOTE - NSFOLLOWUPINSTRUCTIONS_ED_ALL_ED_FT
Alcohol intoxication occurs when the amount of alcohol that a person has consumed impairs his or her ability to mentally and physically function. Chronic alcohol consumption can also lead to a variety of health issues including neurological disease, stomach disease, heart disease, liver disease, etc. Do not drive after drinking alcohol.     Take Librium as prescribed    Your blood pressure was high today, please  take norvasc as prescribed  Follow up with your primary care provider within 2-3 days for re-evaluation    SEEK IMMEDIATE MEDICAL CARE IF YOU HAVE ANY OF THE FOLLOWING SYMPTOMS: seizures, vomiting blood, blood in your stool, lightheadedness/dizziness, or becoming shaky to tremulous when you stop drinking.

## 2020-03-08 NOTE — ED PROVIDER NOTE - PHYSICAL EXAMINATION
VITAL SIGNS: I have reviewed nursing notes and confirm.  CONSTITUTIONAL: Well-developed; well-nourished; in no acute distress.  SKIN: Skin is warm and dry, no acute rash.  HEAD: Normocephalic; atraumatic.  EYES: PERRL, EOM intact; conjunctiva and sclera clear.  ENT: No nasal discharge; airway clear.  NECK: Supple; non tender.  CARD: S1, S2 normal; no murmurs, gallops, or rubs. Tachycardic, regular rhythm.  RESP: No wheezes, rales or rhonchi.  ABD: Normal bowel sounds; soft; non-distended; non-tender;  no palpable or pulsating mass; no hepatosplenomegaly.  EXT: Normal ROM. No clubbing, cyanosis or edema.  NEURO: Alert, oriented. Grossly unremarkable.  PSYCH: Cooperative, appropriate. VITAL SIGNS: I have reviewed nursing notes and confirm. CIWA 3.   CONSTITUTIONAL: Well-developed; well-nourished; in no acute distress. no tremor, no tongue fasciculations   SKIN: Skin is warm and dry, no acute rash. +flushed face.   HEAD: Normocephalic; atraumatic.  EYES: PERRL, EOM intact; conjunctiva and sclera clear.  ENT: No nasal discharge; airway clear.  NECK: Supple; non tender.  CARD: S1, S2 normal; no murmurs, gallops, or rubs. Tachycardic, regular rhythm.  RESP: No wheezes, rales or rhonchi.  ABD:soft; non-distended; non-tender  EXT: Normal ROM x 4. steady gait.   NEURO: Alert, oriented. Grossly unremarkable.  PSYCH: Cooperative, appropriate.

## 2020-03-08 NOTE — ED PROVIDER NOTE - CARE PROVIDER_API CALL
Prabhu Arreaga (DO)  [address], [facility] [place] [address]  Phone: [phone]  Fax: [phone]  Follow Up Time:

## 2020-03-08 NOTE — ED PROVIDER NOTE - CHPI ED SYMPTOMS NEG
no vomiting/no SI, no abd pain, no chest pain, no SOB, no back pain, no neck pain, no headache/no chills/no nausea/no fever

## 2020-03-08 NOTE — ED ADULT NURSE NOTE - OBJECTIVE STATEMENT
38 y/o M c/o shakiness. pt reports hx alcohol abuse. denies any nausea, vomiting, hx seizures, diarrhea, abd pain, chest pain. pt reports he has been on a 10 day drinking binge, about 2 bottles of wine a day.

## 2020-03-08 NOTE — ED PROVIDER NOTE - OBJECTIVE STATEMENT
38 y/o M with PMHx ETOH abuse presents to ED to be assessed for alcohol withdrawal. Pt states that he has been sober since December and recently relapsed due to the death of a close friend 2 weeks ago. Pt states that he has been drinking 2 bottles of wine daily for the past 9 days. He stopped drinking at 4 AM today and is concerned he will go through withdrawal. No Hx of withdrawal related seizures. Pt has taken Librium in the past which helped. Denies SI, fever, chills, N/V, abd pain, chest pain, SOB, back pain, neck pain, headache, sick contacts, or recent travel. 40 y/o M with PMHx alcohol abuse presents to ED wishing to be assessed for alcohol withdrawal. Pt states that he has been sober from alcohol for the past few months and recently started drinking alcohol again to cope with a death of a close friend 2 weeks ago. Pt states that he has been drinking 2 bottles of wine daily for the past 9 days. He stopped drinking at 4 AM today (11 hours ago) and is concerned he will go through withdrawal later today. No hx of withdrawal related seizures or DTs. Pt has taken Librium in the past which helped. currently in AA and has many AA resources/sponsors. Denies SI, fever, chills, N/V, abd pain, chest pain, SOB, back pain, neck pain, headache, sick contacts, or recent travel.

## 2020-03-08 NOTE — ED PROVIDER NOTE - PATIENT PORTAL LINK FT
You can access the FollowMyHealth Patient Portal offered by [facility] by registering at the following website: [email or web address]. By joining [employer]’s FollowMyHealth portal, you will also be able to view your health information using other applications (apps) compatible with our system.

## 2020-03-08 NOTE — ED PROVIDER NOTE - PROGRESS NOTE DETAILS
BAL 350s, patient feeling better, tolerating PO, improved vitals, patient asking to be discharged home, a&ox4, clear speech, steady gait, has no medical complaints. no signs of withdrawal at discharge. norvasc ordered as patient has history of HTN, noncompliant with norvasc x few years. improved vitals. /95,  upon discharge, patient states he is very anxious to leave and his normal HR is 110s as per patient, will rx librium for few days, rx norvasc, close follow up with pmd, return precautions discussed, patient agrees with plan

## 2020-03-08 NOTE — ED PROVIDER NOTE - CLINICAL SUMMARY MEDICAL DECISION MAKING FREE TEXT BOX
hx alcohol abuse with heavy alcohol ingestion over past week, no signs of trauma, CIWA 3, will check labs, IVF, benzos prn, reassess.   hx HTN noncompliant with meds few years, consider antihypertensive if patient continues to be hypertensive. hx alcohol abuse with heavy alcohol ingestion over past week, no signs of trauma, CIWA 3, will check labs, IVF, benzos prn, reassess. hx HTN noncompliant with meds few years, consider antihypertensive if patient continues to be hypertensive.

## 2020-04-20 ENCOUNTER — TRANSCRIPTION ENCOUNTER (OUTPATIENT)
Age: 40
End: 2020-04-20

## 2020-07-05 ENCOUNTER — EMERGENCY (EMERGENCY)
Facility: HOSPITAL | Age: 40
LOS: 1 days | Discharge: HOME | End: 2020-07-05
Attending: EMERGENCY MEDICINE | Admitting: EMERGENCY MEDICINE
Payer: COMMERCIAL

## 2020-07-05 VITALS
WEIGHT: 229.94 LBS | DIASTOLIC BLOOD PRESSURE: 92 MMHG | HEIGHT: 68 IN | SYSTOLIC BLOOD PRESSURE: 165 MMHG | TEMPERATURE: 98 F | HEART RATE: 157 BPM | OXYGEN SATURATION: 95 % | RESPIRATION RATE: 17 BRPM

## 2020-07-05 LAB
ALBUMIN SERPL ELPH-MCNC: 3.8 G/DL — SIGNIFICANT CHANGE UP (ref 3.4–5)
ALP SERPL-CCNC: 144 U/L — HIGH (ref 40–120)
ALT FLD-CCNC: 232 U/L — HIGH (ref 12–42)
ANION GAP SERPL CALC-SCNC: 15 MMOL/L — SIGNIFICANT CHANGE UP (ref 9–16)
APPEARANCE UR: CLEAR — SIGNIFICANT CHANGE UP
AST SERPL-CCNC: 263 U/L — HIGH (ref 15–37)
BASOPHILS # BLD AUTO: 0 K/UL — SIGNIFICANT CHANGE UP (ref 0–0.2)
BASOPHILS NFR BLD AUTO: 0 % — SIGNIFICANT CHANGE UP (ref 0–2)
BILIRUB SERPL-MCNC: 0.8 MG/DL — SIGNIFICANT CHANGE UP (ref 0.2–1.2)
BILIRUB UR-MCNC: NEGATIVE — SIGNIFICANT CHANGE UP
BUN SERPL-MCNC: 11 MG/DL — SIGNIFICANT CHANGE UP (ref 7–23)
CALCIUM SERPL-MCNC: 8.4 MG/DL — LOW (ref 8.5–10.5)
CHLORIDE SERPL-SCNC: 96 MMOL/L — SIGNIFICANT CHANGE UP (ref 96–108)
CO2 SERPL-SCNC: 27 MMOL/L — SIGNIFICANT CHANGE UP (ref 22–31)
COLOR SPEC: YELLOW — SIGNIFICANT CHANGE UP
CREAT SERPL-MCNC: 1 MG/DL — SIGNIFICANT CHANGE UP (ref 0.5–1.3)
DIFF PNL FLD: ABNORMAL
EOSINOPHIL # BLD AUTO: 0 K/UL — SIGNIFICANT CHANGE UP (ref 0–0.5)
EOSINOPHIL NFR BLD AUTO: 0 % — SIGNIFICANT CHANGE UP (ref 0–6)
ETHANOL SERPL-MCNC: 334 MG/DL — HIGH
GLUCOSE SERPL-MCNC: 154 MG/DL — HIGH (ref 70–99)
GLUCOSE UR QL: NEGATIVE — SIGNIFICANT CHANGE UP
HCT VFR BLD CALC: 47.7 % — SIGNIFICANT CHANGE UP (ref 39–50)
HGB BLD-MCNC: 16.5 G/DL — SIGNIFICANT CHANGE UP (ref 13–17)
KETONES UR-MCNC: NEGATIVE — SIGNIFICANT CHANGE UP
LEUKOCYTE ESTERASE UR-ACNC: NEGATIVE — SIGNIFICANT CHANGE UP
LIDOCAIN IGE QN: 46 U/L — LOW (ref 73–393)
LYMPHOCYTES # BLD AUTO: 26 % — SIGNIFICANT CHANGE UP (ref 13–44)
LYMPHOCYTES # BLD AUTO: 4.4 K/UL — HIGH (ref 1–3.3)
MAGNESIUM SERPL-MCNC: 1.9 MG/DL — SIGNIFICANT CHANGE UP (ref 1.6–2.6)
MCHC RBC-ENTMCNC: 27.5 PG — SIGNIFICANT CHANGE UP (ref 27–34)
MCHC RBC-ENTMCNC: 34.6 GM/DL — SIGNIFICANT CHANGE UP (ref 32–36)
MCV RBC AUTO: 79.4 FL — LOW (ref 80–100)
MONOCYTES # BLD AUTO: 0.85 K/UL — SIGNIFICANT CHANGE UP (ref 0–0.9)
MONOCYTES NFR BLD AUTO: 5 % — SIGNIFICANT CHANGE UP (ref 2–14)
NEUTROPHILS # BLD AUTO: 10.99 K/UL — HIGH (ref 1.8–7.4)
NEUTROPHILS NFR BLD AUTO: 65 % — SIGNIFICANT CHANGE UP (ref 43–77)
NITRITE UR-MCNC: NEGATIVE — SIGNIFICANT CHANGE UP
NRBC # BLD: SIGNIFICANT CHANGE UP /100 WBCS (ref 0–0)
PCP SPEC-MCNC: SIGNIFICANT CHANGE UP
PH UR: 6.5 — SIGNIFICANT CHANGE UP (ref 5–8)
PLATELET # BLD AUTO: 307 K/UL — SIGNIFICANT CHANGE UP (ref 150–400)
POTASSIUM SERPL-MCNC: 3.2 MMOL/L — LOW (ref 3.5–5.3)
POTASSIUM SERPL-SCNC: 3.2 MMOL/L — LOW (ref 3.5–5.3)
PROT SERPL-MCNC: 8.5 G/DL — HIGH (ref 6.4–8.2)
PROT UR-MCNC: NEGATIVE MG/DL — SIGNIFICANT CHANGE UP
RBC # BLD: 6.01 M/UL — HIGH (ref 4.2–5.8)
RBC # FLD: 13.6 % — SIGNIFICANT CHANGE UP (ref 10.3–14.5)
SODIUM SERPL-SCNC: 138 MMOL/L — SIGNIFICANT CHANGE UP (ref 132–145)
SP GR SPEC: <=1.005 — SIGNIFICANT CHANGE UP (ref 1–1.03)
UROBILINOGEN FLD QL: 0.2 E.U./DL — SIGNIFICANT CHANGE UP
WBC # BLD: 16.91 K/UL — HIGH (ref 3.8–10.5)
WBC # FLD AUTO: 16.91 K/UL — HIGH (ref 3.8–10.5)

## 2020-07-05 PROCEDURE — 71045 X-RAY EXAM CHEST 1 VIEW: CPT | Mod: 26

## 2020-07-05 PROCEDURE — 93010 ELECTROCARDIOGRAM REPORT: CPT

## 2020-07-05 PROCEDURE — 99220: CPT | Mod: 25

## 2020-07-05 PROCEDURE — 99217: CPT

## 2020-07-05 RX ORDER — SODIUM CHLORIDE 9 MG/ML
1000 INJECTION INTRAMUSCULAR; INTRAVENOUS; SUBCUTANEOUS ONCE
Refills: 0 | Status: COMPLETED | OUTPATIENT
Start: 2020-07-05 | End: 2020-07-05

## 2020-07-05 RX ORDER — POTASSIUM CHLORIDE 20 MEQ
40 PACKET (EA) ORAL ONCE
Refills: 0 | Status: COMPLETED | OUTPATIENT
Start: 2020-07-05 | End: 2020-07-05

## 2020-07-05 RX ORDER — SODIUM CHLORIDE 9 MG/ML
1000 INJECTION, SOLUTION INTRAVENOUS
Refills: 0 | Status: DISCONTINUED | OUTPATIENT
Start: 2020-07-05 | End: 2020-07-09

## 2020-07-05 RX ADMIN — SODIUM CHLORIDE 1000 MILLILITER(S): 9 INJECTION INTRAMUSCULAR; INTRAVENOUS; SUBCUTANEOUS at 22:00

## 2020-07-05 RX ADMIN — SODIUM CHLORIDE 1000 MILLILITER(S): 9 INJECTION INTRAMUSCULAR; INTRAVENOUS; SUBCUTANEOUS at 20:58

## 2020-07-05 RX ADMIN — SODIUM CHLORIDE 250 MILLILITER(S): 9 INJECTION, SOLUTION INTRAVENOUS at 20:58

## 2020-07-05 RX ADMIN — Medication 40 MILLIEQUIVALENT(S): at 21:27

## 2020-07-05 RX ADMIN — SODIUM CHLORIDE 1000 MILLILITER(S): 9 INJECTION INTRAMUSCULAR; INTRAVENOUS; SUBCUTANEOUS at 21:42

## 2020-07-05 RX ADMIN — Medication 2 MILLIGRAM(S): at 22:26

## 2020-07-05 RX ADMIN — Medication 100 MILLIGRAM(S): at 21:27

## 2020-07-05 RX ADMIN — SODIUM CHLORIDE 1000 MILLILITER(S): 9 INJECTION, SOLUTION INTRAVENOUS at 22:00

## 2020-07-05 RX ADMIN — Medication 2 MILLIGRAM(S): at 20:58

## 2020-07-05 NOTE — ED ADULT NURSE NOTE - NSIMPLEMENTINTERV_GEN_ALL_ED
Implemented All Fall Risk Interventions:  [place] to call system. Call bell, personal items and telephone within reach. Instruct patient to call for assistance. Room bathroom lighting operational. Non-slip footwear when patient is off stretcher. Physically safe environment: no spills, clutter or unnecessary equipment. Stretcher in lowest position, wheels locked, appropriate side rails in place. Provide visual cue, wrist band, yellow gown, etc. Monitor gait and stability. Monitor for mental status changes and reorient to person, place, and time. Review medications for side effects contributing to fall risk. Reinforce activity limits and safety measures with patient and family.

## 2020-07-05 NOTE — ED CDU PROVIDER INITIAL DAY NOTE - PROGRESS NOTE DETAILS
improved sx s/p IVF and ativan IV, comfortable appearing, HR still in 140-150s, reports baseline 130s.  given librium as well.  Will continue with current monitoring and management, serial CIWA and titrate benzo based on CIWA scale repeat CIWA 2, markedly improved.  Pt reports no CP or palpitations, HR ranging from 110-130s, repeat EKG with sinus tach, no dynamic changes, will give a dose of BB sx and VS improved. HR at baseline.  repeat CIWA 1, counseling provided at bedside.  outpt resources provided, pt desires to go home and will f/u with PMD and cardiology

## 2020-07-05 NOTE — ED PROVIDER NOTE - DIAGNOSTIC INTERPRETATION
Xray (wet reads) interpreted by JOSE MARTIN BAUM   CXR - Cardiac silhouette, aortic knob, mediastinal and hilar contours appear wnl, no acute consolidation, infiltrate, effusion, or PTX. No bony abnormalities noted No

## 2020-07-05 NOTE — ED CDU PROVIDER INITIAL DAY NOTE - DIAGNOSTIC INTERPRETATION
Xray (wet reads) interpreted by JOSE MARTIN BAUM   CXR - Cardiac silhouette, aortic knob, mediastinal and hilar contours appear wnl, no acute consolidation, infiltrate, effusion, or PTX. No bony abnormalities noted

## 2020-07-05 NOTE — ED PROVIDER NOTE - OBJECTIVE STATEMENT
38 yo M with PMHx of alcohol abuse with alcohol withdrawal, PAF not on any AC currently, HTN, presenting c/o palpitations, feeling anxious, tremors, nausea and HA x 1d. 40 yo M with PMHx of alcohol abuse with alcohol withdrawal, PAF not on any AC currently, HTN, presenting c/o palpitations, feeling anxious, tremors, nausea and HA x 1d. Pt reports binge drinking for the past 7 days (approximately 2-3 bottles of wine per day) and stopped 1d ago.  Started feeling jittery with tremors, nausea and HA since yesterday.  Had 1 episode of NBNB emesis yesterday and started feeling palpitations today.  Afraid that he would go into withdrawal so pt drank a bottle of wine this afternoon. Denies LOC, seizures, dizziness, numbness, tingling, photophobia, diplopia, change in vision/hearing/gait/mental status/speech, focal weakness, neck pain, rash, fever, chills, stiffness, CP, SOB, diaphoresis, D/C, abdominal pain, change in urinary/bowel function, dysuria, hematuria, flank pain, AH/VH, paresthesia, and malaise. Of note, pt reports baseline HR in 130s

## 2020-07-05 NOTE — ED ADULT TRIAGE NOTE - CHIEF COMPLAINT QUOTE
Pt walked into ED c.o alcohol withdrawl. Pt state "I am 10 years sober but my friend passed last week and I relapsed and I don't feel right" Pt able to do basic math, denies hallucinations, + nausea, + HA 5/10, - tremors to touch at this time in bl hands. Pt states last drink was 4 hours ago. Pt denies CP, palpitations at this time. Pt walked into ED c.o alcohol withdrawal. Pt state "I am 10 years sober but my friend passed last week and I relapsed and I don't feel right. I have been drinking for the past 7 days heavily" Pt able to do basic math, denies hallucinations, + nausea, + HA 5/10, - tremors to touch at this time in bl hands. Pt states last drink was 4 hours ago. Pt denies CP, palpitations at this time.

## 2020-07-05 NOTE — ED PROVIDER NOTE - ATTENDING CONTRIBUTION TO CARE
39 yom pw feeling anxious, shakiness, palpitations, nausea x 1 day.  reports recent binge drinking and stopped 1 day ago.  prior hx of withdrawal.  no cp/sob/abd pain.  reports elevated baseline hr in the 130s.    agree w/ PA, pt appears to be in alcohol withdrawal, CIWA ~ 9-10, will check labs to r/o metabolic derangement, IV ativan and PO librium, reassess

## 2020-07-05 NOTE — ED CDU PROVIDER INITIAL DAY NOTE - OBJECTIVE STATEMENT
38 yo M with PMHx of alcohol abuse with alcohol withdrawal, PAF not on any AC currently, HTN, presenting c/o palpitations, feeling anxious, tremors, nausea and HA x 1d. Pt reports binge drinking for the past 7 days (approximately 2-3 bottles of wine per day) and stopped 1d ago.  Started feeling jittery with tremors, nausea and HA since yesterday.  Had 1 episode of NBNB emesis yesterday and started feeling palpitations today.  Afraid that he would go into withdrawal so pt drank a bottle of wine this afternoon. Denies LOC, seizures, dizziness, numbness, tingling, photophobia, diplopia, change in vision/hearing/gait/mental status/speech, focal weakness, neck pain, rash, fever, chills, stiffness, CP, SOB, diaphoresis, D/C, abdominal pain, change in urinary/bowel function, dysuria, hematuria, flank pain, AH/VH, paresthesia, and malaise. Of note, pt reports baseline HR in 130s

## 2020-07-05 NOTE — ED PROVIDER NOTE - PHYSICAL EXAMINATION
Vital Signs - nursing notes reviewed and confirmed  Gen - Obese M, NAD, comfortable and non-toxic appearing, speaking in full sentences   Skin - warm, dry, intact  HEENT - AT/NC, PERRL, EOMI, no conjunctival injection, moist oral mucosa, TM intact b/l with good cone of lights, +tongue fasciculation, o/p clear with no erythema, edema, or exudate, uvula midline, airway patent, neck supple and NT, FROM, no JVD or carotid bruits b/l, no palpable nodes  CV - S1S2, rapid rate, regular rhythm, no m/r/g   Resp - respiration non-labored, CTAB, symmetric bs b/l, no r/r/w  GI - NABS, soft, ND, NT, no rebound or guarding, no CVAT b/l   MS - w/w/p, no c/c/e, calves supple and NT, distal pulses symmetric b/l, brisk cap refills, +SILT  Neuro - AxOx3, no focal neuro deficits, CN II-XII grossly intact, cerebellar function intact, negative pronator drift, negative nystagmus, +mild asterixis, ambulatory without gait disturbance

## 2020-07-05 NOTE — ED ADULT NURSE NOTE - OBJECTIVE STATEMENT
Pt with c/o palpitations, anxiety. Has been on a 7 days alcohol binge after 10 years sobriety. Last drink 5 hrs pta. tachycardic with CIWA of 10 on arrival. Placed on monitor. Denies drug use

## 2020-07-05 NOTE — ED PROVIDER NOTE - CLINICAL SUMMARY MEDICAL DECISION MAKING FREE TEXT BOX
pt p/w HA, nausea, tremors, and palpitations s/p cessation of alcohol x 1d, noted recent binge drinking, h/o withdrawal in the past but without DT, tachycardiac and hypertensive on arrival with +tongue fasciculation and fine tremors, CIWA 10, s/p ativan and IVF, will keep in obs for serial neuro checks, sx control and close monitoring

## 2020-07-06 VITALS
HEART RATE: 106 BPM | DIASTOLIC BLOOD PRESSURE: 85 MMHG | OXYGEN SATURATION: 98 % | RESPIRATION RATE: 18 BRPM | SYSTOLIC BLOOD PRESSURE: 142 MMHG

## 2020-07-06 LAB
ALBUMIN SERPL ELPH-MCNC: 3.1 G/DL — LOW (ref 3.4–5)
ALP SERPL-CCNC: 120 U/L — SIGNIFICANT CHANGE UP (ref 40–120)
ALT FLD-CCNC: 199 U/L — HIGH (ref 12–42)
ANION GAP SERPL CALC-SCNC: 11 MMOL/L — SIGNIFICANT CHANGE UP (ref 9–16)
AST SERPL-CCNC: 235 U/L — HIGH (ref 15–37)
BILIRUB SERPL-MCNC: 0.7 MG/DL — SIGNIFICANT CHANGE UP (ref 0.2–1.2)
BUN SERPL-MCNC: 9 MG/DL — SIGNIFICANT CHANGE UP (ref 7–23)
CALCIUM SERPL-MCNC: 7.4 MG/DL — LOW (ref 8.5–10.5)
CHLORIDE SERPL-SCNC: 103 MMOL/L — SIGNIFICANT CHANGE UP (ref 96–108)
CO2 SERPL-SCNC: 28 MMOL/L — SIGNIFICANT CHANGE UP (ref 22–31)
CREAT SERPL-MCNC: 0.7 MG/DL — SIGNIFICANT CHANGE UP (ref 0.5–1.3)
GLUCOSE SERPL-MCNC: 103 MG/DL — HIGH (ref 70–99)
HCT VFR BLD CALC: 41.9 % — SIGNIFICANT CHANGE UP (ref 39–50)
HGB BLD-MCNC: 14.3 G/DL — SIGNIFICANT CHANGE UP (ref 13–17)
MCHC RBC-ENTMCNC: 27.3 PG — SIGNIFICANT CHANGE UP (ref 27–34)
MCHC RBC-ENTMCNC: 34.1 GM/DL — SIGNIFICANT CHANGE UP (ref 32–36)
MCV RBC AUTO: 80.1 FL — SIGNIFICANT CHANGE UP (ref 80–100)
NRBC # BLD: 0 /100 WBCS — SIGNIFICANT CHANGE UP (ref 0–0)
PLATELET # BLD AUTO: 227 K/UL — SIGNIFICANT CHANGE UP (ref 150–400)
POTASSIUM SERPL-MCNC: 3.6 MMOL/L — SIGNIFICANT CHANGE UP (ref 3.5–5.3)
POTASSIUM SERPL-SCNC: 3.6 MMOL/L — SIGNIFICANT CHANGE UP (ref 3.5–5.3)
PROT SERPL-MCNC: 7.1 G/DL — SIGNIFICANT CHANGE UP (ref 6.4–8.2)
RBC # BLD: 5.23 M/UL — SIGNIFICANT CHANGE UP (ref 4.2–5.8)
RBC # FLD: 13.6 % — SIGNIFICANT CHANGE UP (ref 10.3–14.5)
SARS-COV-2 RNA SPEC QL NAA+PROBE: SIGNIFICANT CHANGE UP
SODIUM SERPL-SCNC: 142 MMOL/L — SIGNIFICANT CHANGE UP (ref 132–145)
WBC # BLD: 11.53 K/UL — HIGH (ref 3.8–10.5)
WBC # FLD AUTO: 11.53 K/UL — HIGH (ref 3.8–10.5)

## 2020-07-06 PROCEDURE — 99217: CPT

## 2020-07-06 RX ORDER — METOPROLOL TARTRATE 50 MG
50 TABLET ORAL ONCE
Refills: 0 | Status: COMPLETED | OUTPATIENT
Start: 2020-07-06 | End: 2020-07-06

## 2020-07-06 RX ADMIN — SODIUM CHLORIDE 1000 MILLILITER(S): 9 INJECTION INTRAMUSCULAR; INTRAVENOUS; SUBCUTANEOUS at 00:03

## 2020-07-06 RX ADMIN — Medication 50 MILLIGRAM(S): at 00:29

## 2020-07-06 RX ADMIN — SODIUM CHLORIDE 1000 MILLILITER(S): 9 INJECTION INTRAMUSCULAR; INTRAVENOUS; SUBCUTANEOUS at 01:03

## 2020-07-06 RX ADMIN — Medication 25 MILLIGRAM(S): at 03:18

## 2020-07-06 RX ADMIN — SODIUM CHLORIDE 250 MILLILITER(S): 9 INJECTION, SOLUTION INTRAVENOUS at 02:00

## 2020-07-06 RX ADMIN — Medication 2 MILLIGRAM(S): at 00:02

## 2020-07-06 NOTE — ED CDU PROVIDER DISPOSITION NOTE - CARE PROVIDER_API CALL
Alfonzo Alonso  CARDIOVASCULAR DISEASE  [address] [place] [address].  [place] [address]  Phone: [phone]  Fax: [phone]  Follow Up Time:

## 2020-07-06 NOTE — ED ADULT NURSE REASSESSMENT NOTE - NS ED NURSE REASSESS COMMENT FT1
Pt ambulated with steady gait with cane through ED. Reports feeling more confident walking. PA aware.

## 2020-07-06 NOTE — ED CDU PROVIDER DISPOSITION NOTE - PATIENT PORTAL LINK FT
You can access the FollowMyHealth Patient Portal offered by [facility] by registering at the following website: [email or web address]. By joining [facility]’s FollowMyHealth portal, you will also be able to view your health information using other applications (apps) compatible with our system.

## 2020-07-06 NOTE — ED CDU PROVIDER DISPOSITION NOTE - CLINICAL COURSE
38 yo M with PMHx of alcohol abuse with alcohol withdrawal, PAF not on any AC currently, HTN, presenting c/o palpitations, feeling anxious, tremors, nausea and HA x 1d. Pt reports binge drinking for the past 7 days (approximately 2-3 bottles of wine per day) and stopped 1d ago.  Started feeling jittery with tremors, nausea and HA since yesterday.  Had 1 episode of NBNB emesis yesterday and started feeling palpitations today.  Afraid that he would go into withdrawal so pt drank a bottle of wine this afternoon.  Of note, pt reports baseline HR in 130s.  Noted initial CIWA of 10, kept in obs with cardiac monitoring with CIWA protocol for alcohol withdrawal syndrome.  s/p serial IV and po benzo with marked improvement in sx and VS, CIWA and HR downtrending.  counseling provided at bedside.  outpt resources provided, pt desires to go home and will f/u with PMD and cardiology. dc home on a short course of librium, encouraged f/u with cardiology for sinus tachycardia as well, strict return precautions discussed, pt verbalized understanding

## 2020-07-06 NOTE — ED CDU PROVIDER DISPOSITION NOTE - NSFOLLOWUPINSTRUCTIONS_ED_ALL_ED_FT
Alcohol Withdrawal Syndrome  When a person who drinks a lot of alcohol stops drinking, he or she may have unpleasant and serious symptoms. These symptoms are called alcohol withdrawal syndrome. This condition may be mild or severe. It can be life-threatening. It can cause:  Shaking that you cannot control (tremor).Sweating.Headache.Feeling fearful, upset, grouchy, or depressed.Trouble sleeping (insomnia).Nightmares.Fast or uneven heartbeats (palpitations).Alcohol cravings.Feeling sick to your stomach (nausea).Throwing up (vomiting).Being bothered by light and sounds.Confusion.Trouble thinking clearly.Not being hungry (loss of appetite).Big changes in mood (mood swings).If you have all of the following symptoms at the same time, get help right away:  High blood pressure.Fast heartbeat.Trouble breathing.Seizures.Seeing, hearing, feeling, smelling, or tasting things that are not there (hallucinations). These symptoms are known as delirium tremens (DTs). They must be treated at the hospital right away.  Follow these instructions at home:     Take over-the-counter and prescription medicines only as told by your doctor. This includes vitamins.Do not drink alcohol.Do not drive until your doctor says that this is safe for you.Have someone stay with you or be available in case you need help. This should be someone you trust. This person can help you with your symptoms. He or she can also help you to not drink.Drink enough fluid to keep your pee (urine) pale yellow.Think about joining a support group or a treatment program to help you stop drinking.Keep all follow-up visits as told by your doctor. This is important.Contact a doctor if:  Your symptoms get worse.You cannot eat or drink without throwing up.You have a hard time not drinking alcohol.You cannot stop drinking alcohol.Get help right away if:  You have fast or uneven heartbeats.You have chest pain.You have trouble breathing.You have a seizure for the first time.You see, hear, feel, smell, or taste something that is not there.You get very confused.Summary  When a person who drinks a lot of alcohol stops drinking, he or she may have serious symptoms. This is called alcohol withdrawal syndrome.Delirium tremens (DTs) is a group of life-threatening symptoms. You should get help right away if you have these symptoms.Think about joining an alcohol support group or a treatment program.

## 2020-07-06 NOTE — ED CDU PROVIDER DISPOSITION NOTE - ATTENDING CONTRIBUTION TO CARE
pt feels much improved, hr improved but still tachycardic, pt again states is elevated at baseline, declines further workup, wants to manage his withdrawal outpatient, appears reliable, strict return precautions given

## 2020-07-06 NOTE — ED ADULT NURSE REASSESSMENT NOTE - NS ED NURSE REASSESS COMMENT FT1
Delay in pt discharge. During walking trial, pt stumbled and was unable to maintain steady gait without assistance. Pt placed in chair, PA made aware.

## 2020-07-09 DIAGNOSIS — R00.0 TACHYCARDIA, UNSPECIFIED: ICD-10-CM

## 2020-07-09 DIAGNOSIS — Z79.899 OTHER LONG TERM (CURRENT) DRUG THERAPY: ICD-10-CM

## 2020-07-09 DIAGNOSIS — F10.239 ALCOHOL DEPENDENCE WITH WITHDRAWAL, UNSPECIFIED: ICD-10-CM

## 2020-07-09 DIAGNOSIS — I10 ESSENTIAL (PRIMARY) HYPERTENSION: ICD-10-CM

## 2020-07-09 DIAGNOSIS — R00.2 PALPITATIONS: ICD-10-CM

## 2020-07-09 DIAGNOSIS — Z20.828 CONTACT WITH AND (SUSPECTED) EXPOSURE TO OTHER VIRAL COMMUNICABLE DISEASES: ICD-10-CM

## 2020-07-17 ENCOUNTER — EMERGENCY (EMERGENCY)
Facility: HOSPITAL | Age: 40
LOS: 1 days | Discharge: HOME | End: 2020-07-17
Attending: EMERGENCY MEDICINE | Admitting: EMERGENCY MEDICINE
Payer: COMMERCIAL

## 2020-07-17 VITALS
SYSTOLIC BLOOD PRESSURE: 176 MMHG | HEART RATE: 160 BPM | HEIGHT: 67 IN | RESPIRATION RATE: 16 BRPM | DIASTOLIC BLOOD PRESSURE: 99 MMHG | OXYGEN SATURATION: 96 % | WEIGHT: 229.94 LBS | TEMPERATURE: 99 F

## 2020-07-17 DIAGNOSIS — Z79.899 OTHER LONG TERM (CURRENT) DRUG THERAPY: ICD-10-CM

## 2020-07-17 DIAGNOSIS — F10.239 ALCOHOL DEPENDENCE WITH WITHDRAWAL, UNSPECIFIED: ICD-10-CM

## 2020-07-17 DIAGNOSIS — I10 ESSENTIAL (PRIMARY) HYPERTENSION: ICD-10-CM

## 2020-07-17 PROCEDURE — 99285 EMERGENCY DEPT VISIT HI MDM: CPT

## 2020-07-17 RX ORDER — SODIUM CHLORIDE 9 MG/ML
1000 INJECTION INTRAMUSCULAR; INTRAVENOUS; SUBCUTANEOUS ONCE
Refills: 0 | Status: COMPLETED | OUTPATIENT
Start: 2020-07-17 | End: 2020-07-17

## 2020-07-17 RX ORDER — THIAMINE MONONITRATE (VIT B1) 100 MG
100 TABLET ORAL ONCE
Refills: 0 | Status: COMPLETED | OUTPATIENT
Start: 2020-07-17 | End: 2020-07-17

## 2020-07-17 RX ORDER — FOLIC ACID 0.8 MG
1 TABLET ORAL ONCE
Refills: 0 | Status: COMPLETED | OUTPATIENT
Start: 2020-07-17 | End: 2020-07-17

## 2020-07-17 NOTE — ED ADULT TRIAGE NOTE - CHIEF COMPLAINT QUOTE
Patient presents to the ED complaining of ETOH withdrawal.  Been drinking heavily in the last 7 days.  Last drink 4 hours ago.

## 2020-07-18 VITALS
SYSTOLIC BLOOD PRESSURE: 145 MMHG | HEART RATE: 133 BPM | RESPIRATION RATE: 18 BRPM | OXYGEN SATURATION: 97 % | DIASTOLIC BLOOD PRESSURE: 79 MMHG

## 2020-07-18 LAB
ALBUMIN SERPL ELPH-MCNC: 3.7 G/DL — SIGNIFICANT CHANGE UP (ref 3.4–5)
ALP SERPL-CCNC: 126 U/L — HIGH (ref 40–120)
ALT FLD-CCNC: 174 U/L — HIGH (ref 12–42)
ANION GAP SERPL CALC-SCNC: 19 MMOL/L — HIGH (ref 9–16)
AST SERPL-CCNC: 109 U/L — HIGH (ref 15–37)
BILIRUB DIRECT SERPL-MCNC: 0.2 MG/DL — SIGNIFICANT CHANGE UP
BILIRUB INDIRECT FLD-MCNC: 0.2 MG/DL — SIGNIFICANT CHANGE UP (ref 0.2–1)
BILIRUB SERPL-MCNC: 0.4 MG/DL — SIGNIFICANT CHANGE UP (ref 0.2–1.2)
BUN SERPL-MCNC: 15 MG/DL — SIGNIFICANT CHANGE UP (ref 7–23)
CALCIUM SERPL-MCNC: 8.5 MG/DL — SIGNIFICANT CHANGE UP (ref 8.5–10.5)
CHLORIDE SERPL-SCNC: 99 MMOL/L — SIGNIFICANT CHANGE UP (ref 96–108)
CO2 SERPL-SCNC: 24 MMOL/L — SIGNIFICANT CHANGE UP (ref 22–31)
CREAT SERPL-MCNC: 1 MG/DL — SIGNIFICANT CHANGE UP (ref 0.5–1.3)
GLUCOSE SERPL-MCNC: 122 MG/DL — HIGH (ref 70–99)
MAGNESIUM SERPL-MCNC: 1.9 MG/DL — SIGNIFICANT CHANGE UP (ref 1.6–2.6)
PHOSPHATE SERPL-MCNC: 2.9 MG/DL — SIGNIFICANT CHANGE UP (ref 2.5–4.5)
POTASSIUM SERPL-MCNC: 3.7 MMOL/L — SIGNIFICANT CHANGE UP (ref 3.5–5.3)
POTASSIUM SERPL-SCNC: 3.7 MMOL/L — SIGNIFICANT CHANGE UP (ref 3.5–5.3)
PROT SERPL-MCNC: 8.5 G/DL — HIGH (ref 6.4–8.2)
SODIUM SERPL-SCNC: 142 MMOL/L — SIGNIFICANT CHANGE UP (ref 132–145)

## 2020-07-18 RX ADMIN — Medication 2 MILLIGRAM(S): at 00:13

## 2020-07-18 RX ADMIN — SODIUM CHLORIDE 1000 MILLILITER(S): 9 INJECTION INTRAMUSCULAR; INTRAVENOUS; SUBCUTANEOUS at 00:12

## 2020-07-18 RX ADMIN — Medication 100 MILLIGRAM(S): at 00:12

## 2020-07-18 RX ADMIN — Medication 1 MILLIGRAM(S): at 00:13

## 2020-07-18 RX ADMIN — Medication 50 MILLIGRAM(S): at 01:10

## 2020-07-18 NOTE — ED PROVIDER NOTE - CROS ED RESP ALL NEG
7- CASE MANAGEMENT NOTE:  I notified [facility] in [place] ([phone]) that the pt will probably be discharged to her new home in [place] tomorrow. Discharge instructions and the discharge summary will be faxed to them at [phone]. CM will continue to follow.  HARRY Hairston, CM negative...

## 2020-07-18 NOTE — ED ADULT NURSE NOTE - NSIMPLEMENTINTERV_GEN_ALL_ED
Implemented All Fall Risk Interventions:  [place] to call system. Call bell, personal items and telephone within reach. Instruct patient to call for assistance. Room bathroom lighting operational. Non-slip footwear when patient is off stretcher. Physically safe environment: no spills, clutter or unnecessary equipment. Stretcher in lowest position, wheels locked, appropriate side rails in place. Provide visual cue, wrist band, yellow gown, etc. Monitor gait and stability. Monitor for mental status changes and reorient to person, place, and time. Review medications for side effects contributing to fall risk. Reinforce activity limits and safety measures with patient and family.

## 2020-07-18 NOTE — ED PROVIDER NOTE - CLINICAL SUMMARY MEDICAL DECISION MAKING FREE TEXT BOX
admit to observation for alcohol withdrawal, serial benzos as needed, seizure precautions, re-evaluate

## 2020-07-18 NOTE — ED CDU PROVIDER DISPOSITION NOTE - PATIENT PORTAL LINK FT
You can access the FollowMyHealth Patient Portal offered by [facility] by registering at the following website: [email or web address]. By joining [facility]’s FollowMyHealth portal, you will also be able to view your health information using other applications (apps) compatible with our system.

## 2020-07-18 NOTE — ED CDU PROVIDER DISPOSITION NOTE - CLINICAL COURSE
pt feeling better states his sx have resolved and he demanded to be discharged home, tolerated PO, states he will fill his rx and seek outpatient detox services. pt left before dc papers could be given.

## 2020-07-18 NOTE — ED PROVIDER NOTE - OBJECTIVE STATEMENT
39 y.o. male with hx etoh abuse, binge drinking hx with previous admissions to ED for etoh withdrawal sx, has been referred to outpatient detox in the past but has frequent relapses that prevent him from completing outpatient detox.  Today comes to ED with c/o shaking tremors, mild nausea, c/w alcohol withdrawal, he states his last drink was 4 hours PTA in ED but prior to that he had been tapering his etoh intake for the last 3-4 days.

## 2020-07-18 NOTE — ED PROVIDER NOTE - PHYSICAL EXAMINATION
VSS in NAD non toxic appearing   appears disheveled good hygiene   NCAT EOMI PERRL OP clear no tongue fasciculations   heart RRR tachy cardia 130s regular   lungs CTA no wheezing no rales no rhonchi   abd soft NT ND no CVAT no guarding no rebound   normal neuro exam CN I-XII grossly intact, no gross motor or sensory deficits  very mild tremor   no peripheral c/c/e

## 2020-07-18 NOTE — ED ADULT NURSE NOTE - CHPI ED NUR SYMPTOMS NEG
no nausea/no weakness/no dizziness/no chills/no vomiting/no fever/no tingling/no decreased eating/drinking

## 2020-07-18 NOTE — ED ADULT NURSE REASSESSMENT NOTE - NS ED NURSE REASSESS COMMENT FT1
On reassessment for CIWA scale, pt not in room.  IV catheter on floor of room.  MD made aware. On reassessment for CIWA scale, pt states he wishes to leave.  MD made aware.

## 2020-07-18 NOTE — ED ADULT NURSE NOTE - OBJECTIVE STATEMENT
Pt w/ PMH of ETOH abuse disorder presents to ED today c/o sxs of withdrawal.  Pt elicits he has drank excessively x7 days, stating his last drink was today at 12pm.  Pt elicits progressive sxs of anxiety, HA, tremors and diaphoresis.  Pt is A&Ox3 during exam, and denies tactile/auditory/visual hallucinations.  Pt elicits x1 hx of withdrawal, and states withdrawal well-relieved w/ ativan and libirum.  Pt denies hx of DTs or intubation.  Pt on CCM with noted sinus tachycardia, pt elicits baseline tachycardia of 100-110.  Will continue to monitor.

## 2020-07-30 NOTE — ED PROVIDER NOTE - CROS ED GI ALL NEG
Introduction Text (Please End With A Colon): The following procedure was deferred: Detail Level: Detailed negative...

## 2020-10-31 VITALS
RESPIRATION RATE: 18 BRPM | OXYGEN SATURATION: 96 % | WEIGHT: 229.94 LBS | DIASTOLIC BLOOD PRESSURE: 93 MMHG | TEMPERATURE: 99 F | HEIGHT: 67 IN | HEART RATE: 160 BPM | SYSTOLIC BLOOD PRESSURE: 130 MMHG

## 2020-10-31 LAB
ALBUMIN SERPL ELPH-MCNC: 4 G/DL — SIGNIFICANT CHANGE UP (ref 3.4–5)
ALP SERPL-CCNC: 114 U/L — SIGNIFICANT CHANGE UP (ref 40–120)
ALT FLD-CCNC: 97 U/L — HIGH (ref 12–42)
ANION GAP SERPL CALC-SCNC: 25 MMOL/L — HIGH (ref 9–16)
APTT BLD: 29.5 SEC — SIGNIFICANT CHANGE UP (ref 27.5–35.5)
AST SERPL-CCNC: 126 U/L — HIGH (ref 15–37)
BILIRUB SERPL-MCNC: 1.2 MG/DL — SIGNIFICANT CHANGE UP (ref 0.2–1.2)
BUN SERPL-MCNC: 22 MG/DL — SIGNIFICANT CHANGE UP (ref 7–23)
CALCIUM SERPL-MCNC: 8 MG/DL — LOW (ref 8.5–10.5)
CHLORIDE SERPL-SCNC: 91 MMOL/L — LOW (ref 96–108)
CK SERPL-CCNC: 722 U/L — HIGH (ref 39–308)
CO2 SERPL-SCNC: 19 MMOL/L — LOW (ref 22–31)
CREAT SERPL-MCNC: 1.13 MG/DL — SIGNIFICANT CHANGE UP (ref 0.5–1.3)
ETHANOL SERPL-MCNC: 308 MG/DL — HIGH
GLUCOSE SERPL-MCNC: 118 MG/DL — HIGH (ref 70–99)
HCT VFR BLD CALC: 46 % — SIGNIFICANT CHANGE UP (ref 39–50)
HGB BLD-MCNC: 15.7 G/DL — SIGNIFICANT CHANGE UP (ref 13–17)
INR BLD: 1.11 — SIGNIFICANT CHANGE UP (ref 0.88–1.16)
LACTATE SERPL-SCNC: 9.5 MMOL/L — CRITICAL HIGH (ref 0.4–2)
LIDOCAIN IGE QN: 43 U/L — LOW (ref 73–393)
MAGNESIUM SERPL-MCNC: 1.8 MG/DL — SIGNIFICANT CHANGE UP (ref 1.6–2.6)
MCHC RBC-ENTMCNC: 27.9 PG — SIGNIFICANT CHANGE UP (ref 27–34)
MCHC RBC-ENTMCNC: 34.1 GM/DL — SIGNIFICANT CHANGE UP (ref 32–36)
MCV RBC AUTO: 81.9 FL — SIGNIFICANT CHANGE UP (ref 80–100)
PLATELET # BLD AUTO: 288 K/UL — SIGNIFICANT CHANGE UP (ref 150–400)
POTASSIUM SERPL-MCNC: 3.6 MMOL/L — SIGNIFICANT CHANGE UP (ref 3.5–5.3)
POTASSIUM SERPL-SCNC: 3.6 MMOL/L — SIGNIFICANT CHANGE UP (ref 3.5–5.3)
PROT SERPL-MCNC: 8.8 G/DL — HIGH (ref 6.4–8.2)
PROTHROM AB SERPL-ACNC: 13 SEC — SIGNIFICANT CHANGE UP (ref 10.6–13.6)
RBC # BLD: 5.62 M/UL — SIGNIFICANT CHANGE UP (ref 4.2–5.8)
RBC # FLD: 13.3 % — SIGNIFICANT CHANGE UP (ref 10.3–14.5)
SODIUM SERPL-SCNC: 135 MMOL/L — SIGNIFICANT CHANGE UP (ref 132–145)
WBC # BLD: 17.77 K/UL — HIGH (ref 3.8–10.5)
WBC # FLD AUTO: 17.77 K/UL — HIGH (ref 3.8–10.5)

## 2020-10-31 RX ORDER — SODIUM CHLORIDE 9 MG/ML
1000 INJECTION INTRAMUSCULAR; INTRAVENOUS; SUBCUTANEOUS ONCE
Refills: 0 | Status: COMPLETED | OUTPATIENT
Start: 2020-10-31 | End: 2020-10-31

## 2020-10-31 RX ORDER — ACETAMINOPHEN 500 MG
975 TABLET ORAL ONCE
Refills: 0 | Status: COMPLETED | OUTPATIENT
Start: 2020-10-31 | End: 2020-10-31

## 2020-10-31 RX ORDER — SODIUM CHLORIDE 9 MG/ML
2200 INJECTION INTRAMUSCULAR; INTRAVENOUS; SUBCUTANEOUS ONCE
Refills: 0 | Status: COMPLETED | OUTPATIENT
Start: 2020-10-31 | End: 2020-10-31

## 2020-10-31 RX ORDER — VANCOMYCIN HCL 1 G
1000 VIAL (EA) INTRAVENOUS ONCE
Refills: 0 | Status: COMPLETED | OUTPATIENT
Start: 2020-10-31 | End: 2020-10-31

## 2020-10-31 RX ORDER — CEFTRIAXONE 500 MG/1
1000 INJECTION, POWDER, FOR SOLUTION INTRAMUSCULAR; INTRAVENOUS ONCE
Refills: 0 | Status: COMPLETED | OUTPATIENT
Start: 2020-10-31 | End: 2020-10-31

## 2020-10-31 RX ADMIN — SODIUM CHLORIDE 1000 MILLILITER(S): 9 INJECTION INTRAMUSCULAR; INTRAVENOUS; SUBCUTANEOUS at 23:42

## 2020-10-31 RX ADMIN — Medication 1 MILLIGRAM(S): at 23:41

## 2020-10-31 RX ADMIN — CEFTRIAXONE 100 MILLIGRAM(S): 500 INJECTION, POWDER, FOR SOLUTION INTRAMUSCULAR; INTRAVENOUS at 23:42

## 2020-10-31 RX ADMIN — SODIUM CHLORIDE 2200 MILLILITER(S): 9 INJECTION INTRAMUSCULAR; INTRAVENOUS; SUBCUTANEOUS at 23:30

## 2020-10-31 RX ADMIN — Medication 250 MILLIGRAM(S): at 23:42

## 2020-10-31 RX ADMIN — Medication 975 MILLIGRAM(S): at 23:42

## 2020-10-31 NOTE — ED ADULT TRIAGE NOTE - CHIEF COMPLAINT QUOTE
Pt complaining of ETOH withdraw. Pt states that he has been heavily drinking for the past 6 days. Pt states last drink was last night.

## 2020-11-01 ENCOUNTER — INPATIENT (INPATIENT)
Facility: HOSPITAL | Age: 40
LOS: 0 days | Discharge: HOME | DRG: 897 | End: 2020-11-02
Attending: HOSPITALIST | Admitting: HOSPITALIST
Payer: COMMERCIAL

## 2020-11-01 DIAGNOSIS — R65.10 SYSTEMIC INFLAMMATORY RESPONSE SYNDROME (SIRS) OF NON-INFECTIOUS ORIGIN WITHOUT ACUTE ORGAN DYSFUNCTION: ICD-10-CM

## 2020-11-01 DIAGNOSIS — R63.8 OTHER SYMPTOMS AND SIGNS CONCERNING FOOD AND FLUID INTAKE: ICD-10-CM

## 2020-11-01 DIAGNOSIS — F10.239 ALCOHOL DEPENDENCE WITH WITHDRAWAL, UNSPECIFIED: ICD-10-CM

## 2020-11-01 DIAGNOSIS — F10.20 ALCOHOL DEPENDENCE, UNCOMPLICATED: ICD-10-CM

## 2020-11-01 DIAGNOSIS — Z87.891 PERSONAL HISTORY OF NICOTINE DEPENDENCE: ICD-10-CM

## 2020-11-01 LAB
A1C WITH ESTIMATED AVERAGE GLUCOSE RESULT: 5.7 % — HIGH (ref 4–5.6)
ALBUMIN SERPL ELPH-MCNC: 3.1 G/DL — LOW (ref 3.4–5)
ALP SERPL-CCNC: 86 U/L — SIGNIFICANT CHANGE UP (ref 40–120)
ALT FLD-CCNC: 87 U/L — HIGH (ref 12–42)
ANION GAP SERPL CALC-SCNC: 16 MMOL/L — SIGNIFICANT CHANGE UP (ref 9–16)
ANION GAP SERPL CALC-SCNC: 17 MMOL/L — SIGNIFICANT CHANGE UP (ref 5–17)
APPEARANCE UR: CLEAR — SIGNIFICANT CHANGE UP
AST SERPL-CCNC: 119 U/L — HIGH (ref 15–37)
BACTERIA # UR AUTO: PRESENT /HPF
BASOPHILS # BLD AUTO: 0 K/UL — SIGNIFICANT CHANGE UP (ref 0–0.2)
BASOPHILS # BLD AUTO: 0.02 K/UL — SIGNIFICANT CHANGE UP (ref 0–0.2)
BASOPHILS NFR BLD AUTO: 0 % — SIGNIFICANT CHANGE UP (ref 0–2)
BASOPHILS NFR BLD AUTO: 0.3 % — SIGNIFICANT CHANGE UP (ref 0–2)
BILIRUB SERPL-MCNC: 0.8 MG/DL — SIGNIFICANT CHANGE UP (ref 0.2–1.2)
BILIRUB UR-MCNC: NEGATIVE — SIGNIFICANT CHANGE UP
BUN SERPL-MCNC: 14 MG/DL — SIGNIFICANT CHANGE UP (ref 7–23)
BUN SERPL-MCNC: 15 MG/DL — SIGNIFICANT CHANGE UP (ref 7–23)
CALCIUM SERPL-MCNC: 6.8 MG/DL — LOW (ref 8.5–10.5)
CALCIUM SERPL-MCNC: 7.2 MG/DL — LOW (ref 8.4–10.5)
CHLORIDE SERPL-SCNC: 101 MMOL/L — SIGNIFICANT CHANGE UP (ref 96–108)
CHLORIDE SERPL-SCNC: 98 MMOL/L — SIGNIFICANT CHANGE UP (ref 96–108)
CO2 SERPL-SCNC: 22 MMOL/L — SIGNIFICANT CHANGE UP (ref 22–31)
CO2 SERPL-SCNC: 23 MMOL/L — SIGNIFICANT CHANGE UP (ref 22–31)
COLOR SPEC: YELLOW — SIGNIFICANT CHANGE UP
COMMENT - URINE: SIGNIFICANT CHANGE UP
CREAT SERPL-MCNC: 0.66 MG/DL — SIGNIFICANT CHANGE UP (ref 0.5–1.3)
CREAT SERPL-MCNC: 0.87 MG/DL — SIGNIFICANT CHANGE UP (ref 0.5–1.3)
DIFF PNL FLD: ABNORMAL
EOSINOPHIL # BLD AUTO: 0 K/UL — SIGNIFICANT CHANGE UP (ref 0–0.5)
EOSINOPHIL # BLD AUTO: 0.01 K/UL — SIGNIFICANT CHANGE UP (ref 0–0.5)
EOSINOPHIL NFR BLD AUTO: 0 % — SIGNIFICANT CHANGE UP (ref 0–6)
EOSINOPHIL NFR BLD AUTO: 0.1 % — SIGNIFICANT CHANGE UP (ref 0–6)
ESTIMATED AVERAGE GLUCOSE: 117 MG/DL — HIGH (ref 68–114)
GLUCOSE SERPL-MCNC: 159 MG/DL — HIGH (ref 70–99)
GLUCOSE SERPL-MCNC: 88 MG/DL — SIGNIFICANT CHANGE UP (ref 70–99)
GLUCOSE UR QL: NEGATIVE — SIGNIFICANT CHANGE UP
GRAN CASTS # UR COMP ASSIST: ABNORMAL /LPF
HCT VFR BLD CALC: 37.3 % — LOW (ref 39–50)
HGB BLD-MCNC: 12.4 G/DL — LOW (ref 13–17)
IMM GRANULOCYTES NFR BLD AUTO: 0.3 % — SIGNIFICANT CHANGE UP (ref 0–1.5)
KETONES UR-MCNC: 40 MG/DL
LACTATE SERPL-SCNC: 1.2 MMOL/L — SIGNIFICANT CHANGE UP (ref 0.5–2)
LACTATE SERPL-SCNC: 4 MMOL/L — CRITICAL HIGH (ref 0.4–2)
LACTATE SERPL-SCNC: 5.2 MMOL/L — CRITICAL HIGH (ref 0.4–2)
LEUKOCYTE ESTERASE UR-ACNC: NEGATIVE — SIGNIFICANT CHANGE UP
LYMPHOCYTES # BLD AUTO: 1.98 K/UL — SIGNIFICANT CHANGE UP (ref 1–3.3)
LYMPHOCYTES # BLD AUTO: 16 % — SIGNIFICANT CHANGE UP (ref 13–44)
LYMPHOCYTES # BLD AUTO: 2.84 K/UL — SIGNIFICANT CHANGE UP (ref 1–3.3)
LYMPHOCYTES # BLD AUTO: 28.8 % — SIGNIFICANT CHANGE UP (ref 13–44)
MAGNESIUM SERPL-MCNC: 1.8 MG/DL — SIGNIFICANT CHANGE UP (ref 1.6–2.6)
MANUAL SMEAR VERIFICATION: SIGNIFICANT CHANGE UP
MCHC RBC-ENTMCNC: 27.5 PG — SIGNIFICANT CHANGE UP (ref 27–34)
MCHC RBC-ENTMCNC: 33.2 GM/DL — SIGNIFICANT CHANGE UP (ref 32–36)
MCV RBC AUTO: 82.7 FL — SIGNIFICANT CHANGE UP (ref 80–100)
MONOCYTES # BLD AUTO: 0.42 K/UL — SIGNIFICANT CHANGE UP (ref 0–0.9)
MONOCYTES # BLD AUTO: 0.71 K/UL — SIGNIFICANT CHANGE UP (ref 0–0.9)
MONOCYTES NFR BLD AUTO: 4 % — SIGNIFICANT CHANGE UP (ref 2–14)
MONOCYTES NFR BLD AUTO: 6.1 % — SIGNIFICANT CHANGE UP (ref 2–14)
NEUTROPHILS # BLD AUTO: 13.33 K/UL — HIGH (ref 1.8–7.4)
NEUTROPHILS # BLD AUTO: 4.42 K/UL — SIGNIFICANT CHANGE UP (ref 1.8–7.4)
NEUTROPHILS NFR BLD AUTO: 64.4 % — SIGNIFICANT CHANGE UP (ref 43–77)
NEUTROPHILS NFR BLD AUTO: 74 % — SIGNIFICANT CHANGE UP (ref 43–77)
NEUTS BAND # BLD: 1 % — SIGNIFICANT CHANGE UP (ref 0–8)
NITRITE UR-MCNC: NEGATIVE — SIGNIFICANT CHANGE UP
NRBC # BLD: 0 /100 WBCS — SIGNIFICANT CHANGE UP (ref 0–0)
NRBC # BLD: 0 /100 — SIGNIFICANT CHANGE UP (ref 0–0)
NRBC # BLD: SIGNIFICANT CHANGE UP /100 WBCS (ref 0–0)
PH UR: 5.5 — SIGNIFICANT CHANGE UP (ref 5–8)
PHOSPHATE SERPL-MCNC: 1.1 MG/DL — LOW (ref 2.5–4.5)
PLAT MORPH BLD: NORMAL — SIGNIFICANT CHANGE UP
PLATELET # BLD AUTO: 181 K/UL — SIGNIFICANT CHANGE UP (ref 150–400)
PLATELET CLUMP BLD QL SMEAR: ABNORMAL
PLATELET COUNT - ESTIMATE: NORMAL — SIGNIFICANT CHANGE UP
POTASSIUM SERPL-MCNC: 3.2 MMOL/L — LOW (ref 3.5–5.3)
POTASSIUM SERPL-MCNC: 3.4 MMOL/L — LOW (ref 3.5–5.3)
POTASSIUM SERPL-SCNC: 3.2 MMOL/L — LOW (ref 3.5–5.3)
POTASSIUM SERPL-SCNC: 3.4 MMOL/L — LOW (ref 3.5–5.3)
PROCALCITONIN SERPL-MCNC: 0.28 NG/ML — HIGH (ref 0.02–0.1)
PROT SERPL-MCNC: 7.1 G/DL — SIGNIFICANT CHANGE UP (ref 6.4–8.2)
PROT UR-MCNC: ABNORMAL MG/DL
RAPID RVP RESULT: SIGNIFICANT CHANGE UP
RBC # BLD: 4.51 M/UL — SIGNIFICANT CHANGE UP (ref 4.2–5.8)
RBC # FLD: 13.3 % — SIGNIFICANT CHANGE UP (ref 10.3–14.5)
RBC BLD AUTO: NORMAL — SIGNIFICANT CHANGE UP
RBC CASTS # UR COMP ASSIST: < 5 /HPF — SIGNIFICANT CHANGE UP
SMUDGE CELLS # BLD: PRESENT — SIGNIFICANT CHANGE UP
SODIUM SERPL-SCNC: 137 MMOL/L — SIGNIFICANT CHANGE UP (ref 135–145)
SODIUM SERPL-SCNC: 140 MMOL/L — SIGNIFICANT CHANGE UP (ref 132–145)
SP GR SPEC: 1.01 — SIGNIFICANT CHANGE UP (ref 1–1.03)
TROPONIN I SERPL-MCNC: <0.017 NG/ML — LOW (ref 0.02–0.06)
TSH SERPL-MCNC: 2.16 UIU/ML — SIGNIFICANT CHANGE UP (ref 0.36–3.74)
UROBILINOGEN FLD QL: 0.2 E.U./DL — SIGNIFICANT CHANGE UP
VARIANT LYMPHS # BLD: 5 % — SIGNIFICANT CHANGE UP (ref 0–6)
WBC # BLD: 6.87 K/UL — SIGNIFICANT CHANGE UP (ref 3.8–10.5)
WBC # FLD AUTO: 6.87 K/UL — SIGNIFICANT CHANGE UP (ref 3.8–10.5)
WBC UR QL: < 5 /HPF — SIGNIFICANT CHANGE UP

## 2020-11-01 PROCEDURE — 99223 1ST HOSP IP/OBS HIGH 75: CPT | Mod: AI,GC

## 2020-11-01 PROCEDURE — 71045 X-RAY EXAM CHEST 1 VIEW: CPT | Mod: 26

## 2020-11-01 PROCEDURE — 99291 CRITICAL CARE FIRST HOUR: CPT

## 2020-11-01 PROCEDURE — 99223 1ST HOSP IP/OBS HIGH 75: CPT | Mod: GC

## 2020-11-01 PROCEDURE — 93010 ELECTROCARDIOGRAM REPORT: CPT

## 2020-11-01 RX ORDER — FOLIC ACID 0.8 MG
1 TABLET ORAL DAILY
Refills: 0 | Status: DISCONTINUED | OUTPATIENT
Start: 2020-11-01 | End: 2020-11-02

## 2020-11-01 RX ORDER — SODIUM CHLORIDE 9 MG/ML
1000 INJECTION INTRAMUSCULAR; INTRAVENOUS; SUBCUTANEOUS ONCE
Refills: 0 | Status: COMPLETED | OUTPATIENT
Start: 2020-11-01 | End: 2020-11-01

## 2020-11-01 RX ORDER — FOLIC ACID 0.8 MG
1 TABLET ORAL ONCE
Refills: 0 | Status: COMPLETED | OUTPATIENT
Start: 2020-11-01 | End: 2020-11-01

## 2020-11-01 RX ORDER — NICOTINE POLACRILEX 2 MG
1 GUM BUCCAL DAILY
Refills: 0 | Status: DISCONTINUED | OUTPATIENT
Start: 2020-11-01 | End: 2020-11-02

## 2020-11-01 RX ORDER — MAGNESIUM SULFATE 500 MG/ML
1 VIAL (ML) INJECTION ONCE
Refills: 0 | Status: COMPLETED | OUTPATIENT
Start: 2020-11-01 | End: 2020-11-01

## 2020-11-01 RX ORDER — THIAMINE MONONITRATE (VIT B1) 100 MG
100 TABLET ORAL ONCE
Refills: 0 | Status: COMPLETED | OUTPATIENT
Start: 2020-11-01 | End: 2020-11-01

## 2020-11-01 RX ORDER — THIAMINE MONONITRATE (VIT B1) 100 MG
100 TABLET ORAL DAILY
Refills: 0 | Status: DISCONTINUED | OUTPATIENT
Start: 2020-11-01 | End: 2020-11-02

## 2020-11-01 RX ORDER — POTASSIUM PHOSPHATE, MONOBASIC POTASSIUM PHOSPHATE, DIBASIC 236; 224 MG/ML; MG/ML
30 INJECTION, SOLUTION INTRAVENOUS ONCE
Refills: 0 | Status: COMPLETED | OUTPATIENT
Start: 2020-11-01 | End: 2020-11-01

## 2020-11-01 RX ORDER — NICOTINE POLACRILEX 2 MG
1 GUM BUCCAL DAILY
Refills: 0 | Status: DISCONTINUED | OUTPATIENT
Start: 2020-11-01 | End: 2020-11-01

## 2020-11-01 RX ORDER — POTASSIUM CHLORIDE 20 MEQ
40 PACKET (EA) ORAL ONCE
Refills: 0 | Status: COMPLETED | OUTPATIENT
Start: 2020-11-01 | End: 2020-11-01

## 2020-11-01 RX ORDER — CEFTRIAXONE 500 MG/1
2000 INJECTION, POWDER, FOR SOLUTION INTRAMUSCULAR; INTRAVENOUS EVERY 24 HOURS
Refills: 0 | Status: DISCONTINUED | OUTPATIENT
Start: 2020-11-01 | End: 2020-11-01

## 2020-11-01 RX ORDER — SODIUM CHLORIDE 9 MG/ML
1000 INJECTION, SOLUTION INTRAVENOUS
Refills: 0 | Status: DISCONTINUED | OUTPATIENT
Start: 2020-11-01 | End: 2020-11-02

## 2020-11-01 RX ADMIN — Medication 40 MILLIEQUIVALENT(S): at 08:21

## 2020-11-01 RX ADMIN — Medication 1 PATCH: at 19:13

## 2020-11-01 RX ADMIN — Medication 50 MILLIGRAM(S): at 17:00

## 2020-11-01 RX ADMIN — Medication 2 MILLIGRAM(S): at 05:03

## 2020-11-01 RX ADMIN — SODIUM CHLORIDE 1000 MILLILITER(S): 9 INJECTION INTRAMUSCULAR; INTRAVENOUS; SUBCUTANEOUS at 03:24

## 2020-11-01 RX ADMIN — Medication 1 MILLIGRAM(S): at 11:41

## 2020-11-01 RX ADMIN — Medication 25 MILLIGRAM(S): at 11:41

## 2020-11-01 RX ADMIN — Medication 1 MILLIGRAM(S): at 01:17

## 2020-11-01 RX ADMIN — Medication 100 MILLIGRAM(S): at 01:17

## 2020-11-01 RX ADMIN — SODIUM CHLORIDE 100 MILLILITER(S): 9 INJECTION, SOLUTION INTRAVENOUS at 06:36

## 2020-11-01 RX ADMIN — Medication 1 TABLET(S): at 11:41

## 2020-11-01 RX ADMIN — POTASSIUM PHOSPHATE, MONOBASIC POTASSIUM PHOSPHATE, DIBASIC 83.33 MILLIMOLE(S): 236; 224 INJECTION, SOLUTION INTRAVENOUS at 09:38

## 2020-11-01 RX ADMIN — Medication 1 PATCH: at 16:55

## 2020-11-01 RX ADMIN — Medication 100 MILLIGRAM(S): at 11:41

## 2020-11-01 RX ADMIN — Medication 2 MILLIGRAM(S): at 01:16

## 2020-11-01 RX ADMIN — SODIUM CHLORIDE 1000 MILLILITER(S): 9 INJECTION INTRAMUSCULAR; INTRAVENOUS; SUBCUTANEOUS at 01:17

## 2020-11-01 RX ADMIN — Medication 975 MILLIGRAM(S): at 01:12

## 2020-11-01 RX ADMIN — Medication 100 GRAM(S): at 08:21

## 2020-11-01 RX ADMIN — Medication 1 MILLIGRAM(S): at 01:00

## 2020-11-01 RX ADMIN — Medication 25 MILLIGRAM(S): at 06:35

## 2020-11-01 NOTE — PROGRESS NOTE ADULT - PROBLEM SELECTOR PLAN 4
F: D5W and ½ NS at 100cc/hr  E: Replete K<4, Mg <2  N: Regular diet  DVT Proph: SCDs  GI Proph: Protonix    Dispo: 7L to [facility] F: D5W and ½ NS at 100cc/hr  E: Replete K<4, Mg <2  N: Regular diet  DVT ppx: SCDs  GI Proph: Protonix    Dispo: [facility]

## 2020-11-01 NOTE — PROGRESS NOTE ADULT - SUBJECTIVE AND OBJECTIVE BOX
INCOMPLETE    O/N Events:    Subjective/ROS: Patient seen and examined at bedside.     Denies Fever/Chills, HA, CP, SOB, n/v, changes in bowel/urinary habits.  12pt ROS otherwise negative.    VITALS  Vital Signs Last 24 Hrs  T(C): 37.6 (2020 06:31), Max: 38 (31 Oct 2020 23:27)  T(F): 99.6 (2020 06:31), Max: 100.4 (31 Oct 2020 23:27)  HR: 149 (2020 05:47) (127 - 160)  BP: 131/66 (2020 05:47) (108/53 - 143/70)  BP(mean): 92 (2020 05:47) (76 - 92)  RR: 20 (2020 05:47) (16 - 20)  SpO2: 98% (2020 05:47) (93% - 98%)    CAPILLARY BLOOD GLUCOSE      POCT Blood Glucose.: 108 mg/dL (31 Oct 2020 23:32)      PHYSICAL EXAM  GENERAL: NAD, lying in bed comfortably, mildly anxious  HEAD:  Atraumatic, Normocephalic  EYES: EOMI, PERRLA, conjunctiva and sclera clear, pupils mildly dilated  ENT: Moist mucous membranes  NECK: Supple, No JVD  CHEST/LUNG: Clear to auscultation bilaterally; No rales, rhonchi, wheezing, or rubs. Unlabored respirations  HEART: Regular rate and rhythm; No murmurs, rubs, or gallops, tachycardia  ABDOMEN: BSx4; Soft, nontender, nondistended  GENITOURINARY: no CVA tenderness, no suprapubic tenderness  BACK: no bondy tenderness down spine  EXTREMITIES:  2+ Peripheral Pulses, brisk capillary refill. No clubbing, cyanosis, or edema  NERVOUS SYSTEM:  A&Ox3, no focal deficits, mild tremor on outstretched arms  SKIN: No rashes or lesions    MEDICATIONS  (STANDING):  cefTRIAXone   IVPB 2000 milliGRAM(s) IV Intermittent every 24 hours  chlordiazePOXIDE 25 milliGRAM(s) Oral every 6 hours  dextrose 5% + sodium chloride 0.45%. 1000 milliLiter(s) (100 mL/Hr) IV Continuous <Continuous>  folic acid 1 milliGRAM(s) Oral daily  multivitamin 1 Tablet(s) Oral daily  thiamine 100 milliGRAM(s) Oral daily    MEDICATIONS  (PRN):  LORazepam   Injectable 2 milliGRAM(s) IV Push every 2 hours PRN CIWA > 8      No Known Allergies      LABS                        12.4   6.87  )-----------( 181      ( 2020 06:58 )             37.3         137  |  98  |  14  ----------------------------<  159<H>  3.2<L>   |  22  |  0.66    Ca    7.2<L>      2020 06:58  Phos  1.1       Mg     1.8         TPro  7.1  /  Alb  3.1<L>  /  TBili  0.8  /  DBili  x   /  AST  119<H>  /  ALT  87<H>  /  AlkPhos  86      PT/INR - ( 31 Oct 2020 23:30 )   PT: 13.0 sec;   INR: 1.11          PTT - ( 31 Oct 2020 23:30 )  PTT:29.5 sec  Urinalysis Basic - ( 2020 03:13 )    Color: Yellow / Appearance: Clear / S.015 / pH: x  Gluc: x / Ketone: 40 mg/dL  / Bili: NEGATIVE / Urobili: 0.2 E.U./dL   Blood: x / Protein: Trace mg/dL / Nitrite: NEGATIVE   Leuk Esterase: NEGATIVE / RBC: < 5 /HPF / WBC < 5 /HPF   Sq Epi: x / Non Sq Epi: x / Bacteria: Present /HPF      CARDIAC MARKERS ( 31 Oct 2020 23:55 )  <0.017 ng/mL / x     / x     / x     / x      CARDIAC MARKERS ( 31 Oct 2020 23:30 )  x     / x     / 722 U/L / x     / x              IMAGING/EKG/ETC   INCOMPLETE    O/N Events:    Subjective/ROS: Patient seen and examined at bedside. CIWA 4 for mild b/l tremor, headache and some anxiety     Denies Fever/Chills, CP, SOB, n/v, changes in bowel/urinary habits.  12pt ROS otherwise negative.    VITALS  Vital Signs Last 24 Hrs  T(C): 37.6 (2020 06:31), Max: 38 (31 Oct 2020 23:27)  T(F): 99.6 (2020 06:31), Max: 100.4 (31 Oct 2020 23:27)  HR: 149 (2020 05:47) (127 - 160)  BP: 131/66 (2020 05:47) (108/53 - 143/70)  BP(mean): 92 (2020 05:47) (76 - 92)  RR: 20 (2020 05:47) (16 - 20)  SpO2: 98% (2020 05:47) (93% - 98%)    CAPILLARY BLOOD GLUCOSE      POCT Blood Glucose.: 108 mg/dL (31 Oct 2020 23:32)    PHYSICAL EXAM  GENERAL: NAD, lying in bed comfortably, mildly anxious  HEAD:  Atraumatic, Normocephalic  EYES: EOMI, PERRLA, conjunctiva and sclera clear, pupils mildly dilated  ENT: Moist mucous membranes  NECK: Supple, No JVD  CHEST/LUNG: Clear to auscultation bilaterally; No rales, rhonchi, wheezing, or rubs. Unlabored respirations  HEART: Regular rate and rhythm; No murmurs, rubs, or gallops, tachycardia  ABDOMEN: BSx4; Soft, nontender, nondistended  GENITOURINARY: no CVA tenderness, no suprapubic tenderness  BACK: no bondy tenderness down spine  EXTREMITIES:  2+ Peripheral Pulses, brisk capillary refill. No clubbing, cyanosis, or edema  NERVOUS SYSTEM:  A&Ox3, no focal deficits, mild tremor on outstretched arms  SKIN: No rashes or lesions    MEDICATIONS  (STANDING):  cefTRIAXone   IVPB 2000 milliGRAM(s) IV Intermittent every 24 hours  chlordiazePOXIDE 25 milliGRAM(s) Oral every 6 hours  dextrose 5% + sodium chloride 0.45%. 1000 milliLiter(s) (100 mL/Hr) IV Continuous <Continuous>  folic acid 1 milliGRAM(s) Oral daily  multivitamin 1 Tablet(s) Oral daily  thiamine 100 milliGRAM(s) Oral daily    MEDICATIONS  (PRN):  LORazepam   Injectable 2 milliGRAM(s) IV Push every 2 hours PRN CIWA > 8      No Known Allergies      LABS                        12.4   6.87  )-----------( 181      ( 2020 06:58 )             37.3     11-    137  |  98  |  14  ----------------------------<  159<H>  3.2<L>   |  22  |  0.66    Ca    7.2<L>      2020 06:58  Phos  1.1       Mg     1.8         TPro  7.1  /  Alb  3.1<L>  /  TBili  0.8  /  DBili  x   /  AST  119<H>  /  ALT  87<H>  /  AlkPhos  86  11    PT/INR - ( 31 Oct 2020 23:30 )   PT: 13.0 sec;   INR: 1.11          PTT - ( 31 Oct 2020 23:30 )  PTT:29.5 sec  Urinalysis Basic - ( 2020 03:13 )    Color: Yellow / Appearance: Clear / S.015 / pH: x  Gluc: x / Ketone: 40 mg/dL  / Bili: NEGATIVE / Urobili: 0.2 E.U./dL   Blood: x / Protein: Trace mg/dL / Nitrite: NEGATIVE   Leuk Esterase: NEGATIVE / RBC: < 5 /HPF / WBC < 5 /HPF   Sq Epi: x / Non Sq Epi: x / Bacteria: Present /HPF      CARDIAC MARKERS ( 31 Oct 2020 23:55 )  <0.017 ng/mL / x     / x     / x     / x      CARDIAC MARKERS ( 31 Oct 2020 23:30 )  x     / x     / 722 U/L / x     / x              IMAGING/EKG/ETC   INCOMPLETE    O/N Events: Admitted    Subjective/ROS: Patient seen and examined at bedside. CIWA 3 for mild b/l tremor, and some anxiety     Denies Fever/Chills, CP, SOB, n/v, changes in bowel/urinary habits.  12pt ROS otherwise negative.    VITALS  Vital Signs Last 24 Hrs  T(C): 37.6 (2020 06:31), Max: 38 (31 Oct 2020 23:27)  T(F): 99.6 (2020 06:31), Max: 100.4 (31 Oct 2020 23:27)  HR: 149 (2020 05:47) (127 - 160)  BP: 131/66 (2020 05:47) (108/53 - 143/70)  BP(mean): 92 (2020 05:47) (76 - 92)  RR: 20 (2020 05:47) (16 - 20)  SpO2: 98% (2020 05:47) (93% - 98%)    CAPILLARY BLOOD GLUCOSE    POCT Blood Glucose.: 108 mg/dL (31 Oct 2020 23:32)    PHYSICAL EXAM  GENERAL: NAD, lying in bed comfortably, mildly anxious  HEAD:  Atraumatic, Normocephalic  EYES: EOMI, PERRLA, conjunctiva and sclera clear, pupils mildly dilated  ENT: Moist mucous membranes  NECK: Supple, No JVD  CHEST/LUNG: Clear to auscultation bilaterally; No rales, rhonchi, wheezing, or rubs. Unlabored respirations  HEART: Regular rate and rhythm; No murmurs, rubs, or gallops, tachycardia  ABDOMEN: BSx4; Soft, nontender, nondistended  GENITOURINARY: no CVA tenderness, no suprapubic tenderness  BACK: no bondy tenderness down spine  EXTREMITIES:  2+ Peripheral Pulses, brisk capillary refill. No clubbing, cyanosis, or edema  NERVOUS SYSTEM:  A&Ox3, no focal deficits, mild tremor on outstretched arms  SKIN: No rashes or lesions    MEDICATIONS  (STANDING):  cefTRIAXone   IVPB 2000 milliGRAM(s) IV Intermittent every 24 hours  chlordiazePOXIDE 25 milliGRAM(s) Oral every 6 hours  dextrose 5% + sodium chloride 0.45%. 1000 milliLiter(s) (100 mL/Hr) IV Continuous <Continuous>  folic acid 1 milliGRAM(s) Oral daily  multivitamin 1 Tablet(s) Oral daily  thiamine 100 milliGRAM(s) Oral daily    MEDICATIONS  (PRN):  LORazepam   Injectable 2 milliGRAM(s) IV Push every 2 hours PRN CIWA > 8      No Known Allergies      LABS                        12.4   6.87  )-----------( 181      ( 2020 06:58 )             37.3     11-    137  |  98  |  14  ----------------------------<  159<H>  3.2<L>   |  22  |  0.66    Ca    7.2<L>      2020 06:58  Phos  1.1       Mg     1.8         TPro  7.1  /  Alb  3.1<L>  /  TBili  0.8  /  DBili  x   /  AST  119<H>  /  ALT  87<H>  /  AlkPhos  86  11    PT/INR - ( 31 Oct 2020 23:30 )   PT: 13.0 sec;   INR: 1.11          PTT - ( 31 Oct 2020 23:30 )  PTT:29.5 sec  Urinalysis Basic - ( 2020 03:13 )    Color: Yellow / Appearance: Clear / S.015 / pH: x  Gluc: x / Ketone: 40 mg/dL  / Bili: NEGATIVE / Urobili: 0.2 E.U./dL   Blood: x / Protein: Trace mg/dL / Nitrite: NEGATIVE   Leuk Esterase: NEGATIVE / RBC: < 5 /HPF / WBC < 5 /HPF   Sq Epi: x / Non Sq Epi: x / Bacteria: Present /HPF      CARDIAC MARKERS ( 31 Oct 2020 23:55 )  <0.017 ng/mL / x     / x     / x     / x      CARDIAC MARKERS ( 31 Oct 2020 23:30 )  x     / x     / 722 U/L / x     / x              IMAGING/EKG/ETC   TRANSFER FROM  TO [facility]    Hospital Course:  40yo M PMH of alcohol abuse with multiple admissions for withdrawal presents after drinking heavily last night, States he felt shakey and unable to sleep because of the drinking. Had 2 episodes of NBNB vomiting before coming to the hospital.  Has never had DTs. In the ED: T: 99.3F, 100.4F (rectal), HR: 160, BP: 130/93, R: 18, SpO2: 96% on RA. Labs significant for WBC 17.77, Lactate 9.5, AG 25, AST//97, , MOHSEN 305. CXR: no acute infiltrates, EKG: Sinus tachycardia. Given tylenol, ceftriaxone, Folic acid, thiamine, ativan 6mg, started on librium 25q6hr. Given fluids, last received ativan 11/1AM 5am, CIWA 3/4 in the am for mild tremor, anxiety, mild headache. CIWA 11/1 pm 1/2 for mild anxiety. Ceftriaxone dc'd due to no infectious symptoms.     O/N Events: Admitted    Subjective/ROS: Patient seen and examined at bedside. CIWA 3 for mild b/l tremor, and some anxiety     Denies Fever/Chills, CP, SOB, n/v, changes in bowel/urinary habits.  12pt ROS otherwise negative.    VITALS  Vital Signs Last 24 Hrs  T(C): 37.6 (2020 06:31), Max: 38 (31 Oct 2020 23:27)  T(F): 99.6 (2020 06:31), Max: 100.4 (31 Oct 2020 23:27)  HR: 149 (2020 05:47) (127 - 160)  BP: 131/66 (2020 05:47) (108/53 - 143/70)  BP(mean): 92 (2020 05:47) (76 - 92)  RR: 20 (2020 05:47) (16 - 20)  SpO2: 98% (2020 05:47) (93% - 98%)    CAPILLARY BLOOD GLUCOSE    POCT Blood Glucose.: 108 mg/dL (31 Oct 2020 23:32)    PHYSICAL EXAM  GENERAL: NAD, lying in bed comfortably, mildly anxious  HEAD:  Atraumatic, Normocephalic  EYES: EOMI, PERRLA, conjunctiva and sclera clear, pupils mildly dilated  ENT: Moist mucous membranes  NECK: Supple, No JVD  CHEST/LUNG: Clear to auscultation bilaterally; No rales, rhonchi, wheezing, or rubs. Unlabored respirations  HEART: Regular rate and rhythm; No murmurs, rubs, or gallops, tachycardia  ABDOMEN: BSx4; Soft, nontender, nondistended  GENITOURINARY: no CVA tenderness, no suprapubic tenderness  BACK: no bondy tenderness down spine  EXTREMITIES:  2+ Peripheral Pulses, brisk capillary refill. No clubbing, cyanosis, or edema  NERVOUS SYSTEM:  A&Ox3, no focal deficits, mild tremor on outstretched arms  SKIN: No rashes or lesions    MEDICATIONS  (STANDING):  cefTRIAXone   IVPB 2000 milliGRAM(s) IV Intermittent every 24 hours  chlordiazePOXIDE 25 milliGRAM(s) Oral every 6 hours  dextrose 5% + sodium chloride 0.45%. 1000 milliLiter(s) (100 mL/Hr) IV Continuous <Continuous>  folic acid 1 milliGRAM(s) Oral daily  multivitamin 1 Tablet(s) Oral daily  thiamine 100 milliGRAM(s) Oral daily    MEDICATIONS  (PRN):  LORazepam   Injectable 2 milliGRAM(s) IV Push every 2 hours PRN CIWA > 8      No Known Allergies      LABS                        12.4   6.87  )-----------( 181      ( 2020 06:58 )             37.3         137  |  98  |  14  ----------------------------<  159<H>  3.2<L>   |  22  |  0.66    Ca    7.2<L>      2020 06:58  Phos  1.1       Mg     1.8         TPro  7.1  /  Alb  3.1<L>  /  TBili  0.8  /  DBili  x   /  AST  119<H>  /  ALT  87<H>  /  AlkPhos  86      PT/INR - ( 31 Oct 2020 23:30 )   PT: 13.0 sec;   INR: 1.11          PTT - ( 31 Oct 2020 23:30 )  PTT:29.5 sec  Urinalysis Basic - ( 2020 03:13 )    Color: Yellow / Appearance: Clear / S.015 / pH: x  Gluc: x / Ketone: 40 mg/dL  / Bili: NEGATIVE / Urobili: 0.2 E.U./dL   Blood: x / Protein: Trace mg/dL / Nitrite: NEGATIVE   Leuk Esterase: NEGATIVE / RBC: < 5 /HPF / WBC < 5 /HPF   Sq Epi: x / Non Sq Epi: x / Bacteria: Present /HPF      CARDIAC MARKERS ( 31 Oct 2020 23:55 )  <0.017 ng/mL / x     / x     / x     / x      CARDIAC MARKERS ( 31 Oct 2020 23:30 )  x     / x     / 722 U/L / x     / x              IMAGING/EKG/ETC

## 2020-11-01 NOTE — ED ADULT NURSE NOTE - OBJECTIVE STATEMENT
Patient coming to ER complaining of alcohol withdrawal after a week binge. Relevant medical history of alcohol addiction, with multiple admissions for withdrawal. Patient states his last drink with 1800 previous evening. Symptoms include nausea, tremors and anxiety. Denies hallucinations or hearing voices. Will continue to assess.

## 2020-11-01 NOTE — ED ADULT NURSE NOTE - NSIMPLEMENTINTERV_GEN_ALL_ED
Implemented All Fall Risk Interventions:  [place] to call system. Call bell, personal items and telephone within reach. Instruct patient to call for assistance. Room bathroom lighting operational. Non-slip footwear when patient is off stretcher. Physically safe environment: no spills, clutter or unnecessary equipment. Stretcher in lowest position, wheels locked, appropriate side rails in place. Provide visual cue, wrist band, yellow gown, etc. Monitor gait and stability. Monitor for mental status changes and reorient to person, place, and time. Review medications for side effects contributing to fall risk. Reinforce activity limits and safety measures with patient and family.

## 2020-11-01 NOTE — ED ADULT NURSE NOTE - SEPSIS REFERENCE DATA FOR CRITERIA 1 WBC
INTERVAL HPI/OVERNIGHT EVENTS: Ongoing fevers. Disclosed HIV status to mother and sister yesterday. Coping with lymphoma diagnosis.     CONSTITUTIONAL:  Negative chills, feels well, good appetite  EYES:  Negative  blurry vision or double vision  CARDIOVASCULAR:  Negative for chest pain or palpitations  RESPIRATORY:  Negative for cough, wheezing, or SOB   GASTROINTESTINAL:  Negative for nausea, vomiting, diarrhea, constipation, or abdominal pain  GENITOURINARY:  Negative frequency, urgency or dysuria  NEUROLOGIC:  No headache, confusion, dizziness, lightheadedness      ANTIBIOTICS/RELEVANT:    MEDICATIONS  (STANDING):  abacavir 600 mG/dolutegravir 50 mG/lamiVUDine 300 mG 1 Tablet(s) Oral daily  allopurinol 100 milliGRAM(s) Oral daily  chlorhexidine 2% Cloths 1 Application(s) Topical daily  dextrose 5%. 1000 milliLiter(s) (50 mL/Hr) IV Continuous <Continuous>  dextrose 50% Injectable 12.5 Gram(s) IV Push once  dextrose 50% Injectable 25 Gram(s) IV Push once  dextrose 50% Injectable 25 Gram(s) IV Push once  insulin glargine Injectable (LANTUS) 10 Unit(s) SubCutaneous at bedtime  insulin lispro (HumaLOG) corrective regimen sliding scale   SubCutaneous every 6 hours  lidocaine 2% Injectable 50 milliLiter(s) Local Injection once  melatonin 5 milliGRAM(s) Oral at bedtime  multivitamin 1 Tablet(s) Oral daily  pantoprazole    Tablet 40 milliGRAM(s) Oral before breakfast  petrolatum Ophthalmic Ointment 1 Application(s) Both EYES every 8 hours  polyethylene glycol 3350 17 Gram(s) Oral daily  QUEtiapine 50 milliGRAM(s) Oral every 12 hours    MEDICATIONS  (PRN):  acetaminophen    Suspension 650 milliGRAM(s) Oral every 6 hours PRN For Temp greater than 38 C (100.4 F)  dextrose 40% Gel 15 Gram(s) Oral once PRN Blood Glucose LESS THAN 70 milliGRAM(s)/deciliter  glucagon  Injectable 1 milliGRAM(s) IntraMuscular once PRN Glucose LESS THAN 70 milligrams/deciliter        Vital Signs Last 24 Hrs  T(C): 37.1 (02 Jun 2018 06:00), Max: 39.3 (02 Jun 2018 03:00)  T(F): 98.8 (02 Jun 2018 06:00), Max: 102.7 (02 Jun 2018 03:00)  HR: 134 (02 Jun 2018 12:00) (116 - 146)  BP: 115/70 (02 Jun 2018 12:00) (80/33 - 145/81)  BP(mean): 87 (02 Jun 2018 12:00) (50 - 102)  RR: 37 (02 Jun 2018 12:00) (24 - 41)  SpO2: 92% (02 Jun 2018 12:00) (75% - 100%)    PHYSICAL EXAM:  Constitutional:Well-developed, well nourished  Eyes:MANJIT, EOMI  Ear/Nose/Throat: no oral lesion, no sinus tenderness on percussion	  Neck:no JVD, no lymphadenopathy, supple  Respiratory: CTA stephan  Cardiovascular: S1S2 RRR, no murmurs  Gastrointestinal:soft, (+) BS, no HSM  Extremities:no e/e/c  Vascular: DP Pulse:	right normal; left normal      LABS:                        6.8    9.6   )-----------( 50       ( 02 Jun 2018 02:46 )             22.0     06-02    143  |  110<H>  |  44<H>  ----------------------------<  115<H>  4.7   |  24  |  1.79<H>    Ca    7.9<L>      02 Jun 2018 02:46  Phos  3.4     06-02  Mg     2.1     06-02            MICROBIOLOGY: reviewed    RADIOLOGY & ADDITIONAL STUDIES: reviewed Abnormal WBC: < 4,000 OR > 12,000

## 2020-11-01 NOTE — ED ADULT NURSE NOTE - NSPATIENTFLAG_GEN_A_ER
[facility] INITIAL VISIT    Chief Complaint   Patient presents with   • Consultation     chronic migraine without aura without status migranousous, non retractable. Referred by Latasha COLLAZO      Mari Pennington is a 40 year old female who is seen at the request of Dr. Vidal for further interventions regarding her chronic migraines.  The worst pain is located: head , and described as sharp, tiring, stabbing and pressure   Emotional effects of pain:  Exhausting and Miserable  [place] Pain Rating - Worst 8/10 Least 3/10 Average 6/10 Acceptable 4/10    The pain began approximately when patient was 19 years of age. Has been having them every day for the last year and a half over time, the pain is staying the same over time. There was not an injury associated with this pain. There has been multiple occurrences or flares over time. The patient is employed as a web image specialist (computer work).    The pain is only at times.  It does  radiate to back of neck. Pain is aggravated by light, sound, smells, heat, movement,  and alleviated by dark room, sleep, ice packs, peppermint oil, medication.  Patient betancourt have n/v with migraines at times. denies bowel/bladder dysfunction.    Mari Pennington has been treated previously for the same condition by Dr. Stoddard located in [facility].    Other, secondary pain complaints include facial pain. Regarding activity, the patient  remains independent with ADL's such as dressing self and can currently and independently drive.     TREATMENT HISTORY  Medications and Interventions:  Non Opioid Medications: fioriecet -40 mg, not effective, not currently taking                                              Zolmitriptan 5mg, not effective, not taking                                              eletriptanHBR 40 mg effective, currently taking  Opioid Medications:  None   Therapies: acupuncture  effective and heat/cold  effective  Other Interventions:   cold/warm appliication, acupuncture    Recent PHQ 2/9 Score    PHQ 2:  Date PHQ 2 Score   10/9/2017 2       PHQ 9:  Date PHQ 9 Score   10/9/2017 7       Patient denies any suicidal or homicidal ideation.     Oswestry Disability  20 %  SOAPP 5      Migraine                                                        Comment: no aura    Restless legs                                                 Cervical dysplasia                                            Anxiety                                                       Depressive disorder                                           Past Surgical History:   Procedure Laterality Date   • APPENDECTOMY     • CARPAL TUNNEL RELEASE  12/2/2013    right endoscopic carpal tunnel release   • CARPAL TUNNEL RELEASE Right December 2013   • CARPAL TUNNEL RELEASE Left 3/19/2015   • COLONOSCOPY DIAGNOSTIC  2010    Colonoscopy, Dx   • CONIZATION CERVIX,LOOP ELECTRD  2000    x2   • ESOPHAGOGASTRODUODENOSCOPY TRANSORAL FLEX DIAG     • WISDOM TOOTH EXTRACTION         Family History   Problem Relation Age of Onset   • High blood pressure Father    • Heart attack Father 65        • Heart disease Father 65     cabg,mi   • Diabetes Sister    • Asthma Sister    • Asthma Daughter 18     exercise induced    • Cancer Maternal Grandmother 80     colon    • Stroke Maternal Grandfather    • Stroke Paternal Grandmother    • Heart disease Paternal Grandmother    • Stroke Paternal Grandfather    • Heart disease Paternal Grandfather        Social History     Social History   • Marital status:      Spouse name: N/A   • Number of children: N/A   • Years of education: N/A     Occupational History   • , stay at home mom [employer]     Social History Main Topics   • Smoking status: Former Smoker     Packs/day: 1.00     Years: 6.00     Types: Cigarettes     Quit date: 1/1/2000   • Smokeless tobacco: Never Used      Comment: [employer] myde[employer] packaging   • Alcohol use 0.6 oz/week     1  Standard drinks or equivalent per week      Comment: 2-4 drinks per month   • Drug use: No   • Sexual activity: Yes     Partners: Male     Birth control/ protection: None      Comment:  vasectomy     Other Topics Concern   • Not on file     Social History Narrative   • No narrative on file       ALLERGIES:  Niacin; Aspartame; Gluten; Msg [monosodium glutamate]; and Seasonal    MEDICATIONS:  Per Epic record       REVIEW OF SYSTEMS  Positive review of systems were discussed with the patient. There are no new findings that had not been shared with the primary care provider or currently under treatment or evaluation.    General: Reports fatigue and excessing sweating or night sweats.  SKIN: Denies rash, itching and jaundice.   HEENT: Reports ringing in ears.   Cardiovascular: Reports palpitations and discomfort.   Pulmonary: Denies shortness of breath and cough.  Gastrointestinal: Reports heartburn, constipation and nausea, diarrhea.    Genitourinary: Reports loss of bladder control.   Hematological: Reports history of anemia.   Musculoskeletal: Reports joint pain, joint swelling, back pain and neck pain. Denies hx of gout.  Neurological: Reports headaches, numbness, weakness and loss of balance, twitching or tremors. Denies frequent dizziness/lightheadedness and loss of conscience, hx of head injury, difficulty with speech.   Psychological: Denies anxiety, insomnia, panic attacks and rage.      PHYSICAL EXAM    Vital Signs: Last menstrual period 06/21/2017.    General:  Not acutely ill appearing or uncomfortable while seated in the exam chair, does not appear sedated or intoxicated.  Eyes:  PERRL, Conjunctivae pink. Sclera anicteric.  HENT:  There is evidence of slightly larger and very sensitive right zygomatic and maxillary process.  Mucosal membranes moist. External nose is normal.  Neck:  There is pain report with applied examination pressure to bilateral occipital area. In addition with tapping on the  frontal and maxillary areas the patient does report elicitation of pain.. No thyromegaly. Trachea midline.  Respiratory:  Normal respiratory effort. Lungs clear to auscultation bilaterally. Symmetrical chest expansion.  Cardiovascular:  S1 S2 Regular rate and rhythm. No murmurs.  No peripheral edema.  Gastrointestinal:  Abdomen soft. Nontender. Nondistended. Normal bowel sounds.  Integumentary:  Warm, dry and pink. No rashes or lesions. No wounds.  Musculoskeletal and Neurologic:  She is awake alert and oriented ×4. She does not appear somnolent nor intoxicated. Spine appears otherwise straight. She is able to transfer position without problems there is however some problems with tandem walking related to balance.  Psychological: Alert, appropriate thought content, conversation, mood and affect.     IMAGING    None in our system related to this situation    Controlled substance risk assessment  Assessed level of Risk: moderate  Aberrant Behaviors: No  Hx of Substance abuse: No  Contract/ UDS:  Not applicable at this time  MME:  Not applicable  PDMP Reviewed:  Appropriate    impression    1. Face pain  Her long term problem especially on the right maxilary and zygomatic region is evident. She has failed all antihistamines and sinus flush   If the Botox injections do not help, she may be a candidate for a Sphenopalatine block  2. Intractable migraine with aura with status migrainosus  At the present time I am encouraging her to keep her visit that is scheduled with Dr. Jaimes for Botox injections. I reviewed those with her and informed her that Dr. Jaimes and his staff would also review everything with her before and during the procedure.  - She is educated about CeFALY and she would like to consider a trial of this but this may be difficult at this time because of the need to do 10 consecutive days of the trial and her Botox injection scheduled for the next 7 days  3. Cervico-occipital neuralgia  Since the Botox  injections will include this area she is informed as stated above evaluation after Botox injections would be necessary before discussion about anything else.    Plan/Recommendations     she will return to this clinic if necessary after evaluation and treatment by Dr. Jaimes for her Botox injections.  Thank you for referring this very eleni young lady to our clinic. If we can be of service after determination of how the Botox injections work she is strongly encouraged to return to this clinic.               Red S (Sepsis)

## 2020-11-01 NOTE — PROGRESS NOTE ADULT - SUBJECTIVE AND OBJECTIVE BOX
ACCEPTING TRANSFER FROM [facility] TO [facility]:    Hospital Course:  40 yo M PMH of alcohol abuse with multiple admissions for withdrawal presents after drinking heavily last night, States he felt shakey and unable to sleep because of the drinking. Had 2 episodes of NBNB vomiting before coming to the hospital.  Has never had DTs. In the ED: T: 99.3F, 100.4F (rectal), HR: 160, BP: 130/93, R: 18, SpO2: 96% on RA. Labs significant for WBC 17.77, Lactate 9.5, AG 25, AST//97, , MOHSEN 305. CXR: no acute infiltrates, EKG: Sinus tachycardia. Given tylenol, ceftriaxone, Folic acid, thiamine, ativan 6mg, started on librium 25q6hr. Given fluids, last received ativan 11/1AM 5am, CIWA 3/4 in the am for mild tremor, anxiety, mild headache. CIWA 11/1 pm 1/2 for mild anxiety. Ceftriaxone dc'd due to no infectious symptoms.       INTERVAL HPI:  Patient seen and examined at bedside. CIWA currently 1 for mild diaphoresis. Pt denies any fevers, headaches, confusion, tremors, auditory and visual hallucinations. Says he [facility] 3 liters of [facility] and [facility] Typically does not drink during the work week, but drinking "gets out of hand" for large social gatherings. Admits to smoking ~1/2ppd (converted from e-cig use) and requesting a nicotine patch.      VITALS  Vital Signs Last 24 Hrs  T(C): 37.4 (2020 13:49), Max: 38 (31 Oct 2020 23:27)  T(F): 99.3 (2020 13:49), Max: 100.4 (31 Oct 2020 23:27)  HR: 136 (2020 11:48) (127 - 160)  BP: 146/80 (2020 11:48) (108/53 - 146/80)  BP(mean): 107 (2020 11:48) (76 - 107)  RR: 18 (2020 11:48) (16 - 20)  SpO2: 96% (2020 11:48) (93% - 98%)    CAPILLARY BLOOD GLUCOSE      POCT Blood Glucose.: 108 mg/dL (31 Oct 2020 23:32)      PHYSICAL EXAM  General: Obese gentleman sitting comfortably in bed, mild diaphoresis  HEENT: PERRL/ EOMI, no scleral icterus, MMM, no tongue fasciculations  Neck: Supple; no JVD  Respiratory: CTA B/L, no wheezes/crackles   Cardiovascular: tachycardic; +S1 +S2; no murmurs appreciated  Gastrointestinal: Obese, soft, NTND, normoactive BS, no rebound, no guarding, no suprapubic tenderness  Extremities: WWP, no cyanosis, no clubbing, no edema, pulses equal  Neurological: A&Ox3, CNII-XII grossly intact, no gross focal deficits, follows commands, no tremors on hand extension  Skin: Normal temperature, warm, dry    MEDICATIONS  (STANDING):  chlordiazePOXIDE 50 milliGRAM(s) Oral every 8 hours  dextrose 5% + sodium chloride 0.45%. 1000 milliLiter(s) (100 mL/Hr) IV Continuous <Continuous>  folic acid 1 milliGRAM(s) Oral daily  multivitamin 1 Tablet(s) Oral daily  nicotine -  14 mG/24Hr(s) Patch 1 patch Transdermal daily  thiamine 100 milliGRAM(s) Oral daily    MEDICATIONS  (PRN):  LORazepam   Injectable 2 milliGRAM(s) IV Push every 2 hours PRN CIWA > 8      No Known Allergies      LABS                        12.4   6.87  )-----------( 181      ( 2020 06:58 )             37.3         137  |  98  |  14  ----------------------------<  159<H>  3.2<L>   |  22  |  0.66    Ca    7.2<L>      2020 06:58  Phos  1.1       Mg     1.8         TPro  7.1  /  Alb  3.1<L>  /  TBili  0.8  /  DBili  x   /  AST  119<H>  /  ALT  87<H>  /  AlkPhos  86      PT/INR - ( 31 Oct 2020 23:30 )   PT: 13.0 sec;   INR: 1.11          PTT - ( 31 Oct 2020 23:30 )  PTT:29.5 sec  Urinalysis Basic - ( 2020 03:13 )    Color: Yellow / Appearance: Clear / S.015 / pH: x  Gluc: x / Ketone: 40 mg/dL  / Bili: NEGATIVE / Urobili: 0.2 E.U./dL   Blood: x / Protein: Trace mg/dL / Nitrite: NEGATIVE   Leuk Esterase: NEGATIVE / RBC: < 5 /HPF / WBC < 5 /HPF   Sq Epi: x / Non Sq Epi: x / Bacteria: Present /HPF      CARDIAC MARKERS ( 31 Oct 2020 23:55 )  <0.017 ng/mL / x     / x     / x     / x      CARDIAC MARKERS ( 31 Oct 2020 23:30 )  x     / x     / 722 U/L / x     / x            RADIOLOGY & ADDITIONAL TESTS: Reviewed ACCEPTING TRANSFER FROM [facility] TO [facility]:    Hospital Course:  40 yo M PMH of alcohol abuse with multiple admissions for withdrawal presents after drinking heavily last night, States he felt shakey and unable to sleep because of the drinking. Had 2 episodes of NBNB vomiting before coming to the hospital.  Has never had DTs. In the ED: T: 99.3F, 100.4F (rectal), HR: 160, BP: 130/93, R: 18, SpO2: 96% on RA. Labs significant for WBC 17.77, Lactate 9.5, AG 25, AST//97, , MOHSEN 305. CXR: no acute infiltrates, EKG: Sinus tachycardia. Given tylenol, ceftriaxone, Folic acid, thiamine, ativan 6mg, started on librium 25q6hr. Given fluids, last received ativan 11/1AM 5am, CIWA 3/4 in the am for mild tremor, anxiety, mild headache. CIWA 11/1 pm 1/2 for mild anxiety. Ceftriaxone dc'd due to no infectious symptoms.       INTERVAL HPI:  Patient seen and examined at bedside. CIWA currently 1 for mild diaphoresis. Pt denies any fevers, headaches, confusion, tremors, auditory and visual hallucinations. Says he drank 3 liters of liquor and [facility] bottles [facility] Typically does not drink during the work week, but drinking "gets out of hand" for large social gatherings. Admits to smoking ~1/2ppd (converted from e-cig use) and requesting a nicotine patch.      Family history, social history, past medical history, and home medications from ICU H&P were reviewed and are unchanged except as noted below.     VITALS  Vital Signs Last 24 Hrs  T(C): 37.4 (2020 13:49), Max: 38 (31 Oct 2020 23:27)  T(F): 99.3 (2020 13:49), Max: 100.4 (31 Oct 2020 23:27)  HR: 136 (2020 11:48) (127 - 160)  BP: 146/80 (2020 11:48) (108/53 - 146/80)  BP(mean): 107 (2020 11:48) (76 - 107)  RR: 18 (2020 11:48) (16 - 20)  SpO2: 96% (2020 11:48) (93% - 98%)    CAPILLARY BLOOD GLUCOSE      POCT Blood Glucose.: 108 mg/dL (31 Oct 2020 23:32)      PHYSICAL EXAM  General: Obese gentleman sitting comfortably in bed, mild diaphoresis  HEENT: PERRL/ EOMI, no scleral icterus, MMM, no tongue fasciculations  Neck: Supple; no JVD  Respiratory: CTA B/L, no wheezes/crackles   Cardiovascular: tachycardic; +S1 +S2; no murmurs appreciated  Gastrointestinal: Obese, soft, NTND, normoactive BS, no rebound, no guarding, no suprapubic tenderness  Extremities: WWP, no cyanosis, no clubbing, no edema, pulses equal  Neurological: A&Ox3, CNII-XII grossly intact, no gross focal deficits, follows commands, no tremors on hand extension  Skin: Normal temperature, warm, dry    MEDICATIONS  (STANDING):  chlordiazePOXIDE 50 milliGRAM(s) Oral every 8 hours  dextrose 5% + sodium chloride 0.45%. 1000 milliLiter(s) (100 mL/Hr) IV Continuous <Continuous>  folic acid 1 milliGRAM(s) Oral daily  multivitamin 1 Tablet(s) Oral daily  nicotine -  14 mG/24Hr(s) Patch 1 patch Transdermal daily  thiamine 100 milliGRAM(s) Oral daily    MEDICATIONS  (PRN):  LORazepam   Injectable 2 milliGRAM(s) IV Push every 2 hours PRN CIWA > 8      No Known Allergies      LABS                        12.4   6.87  )-----------( 181      ( 2020 06:58 )             37.3         137  |  98  |  14  ----------------------------<  159<H>  3.2<L>   |  22  |  0.66    Ca    7.2<L>      2020 06:58  Phos  1.1       Mg     1.8         TPro  7.1  /  Alb  3.1<L>  /  TBili  0.8  /  DBili  x   /  AST  119<H>  /  ALT  87<H>  /  AlkPhos  86      PT/INR - ( 31 Oct 2020 23:30 )   PT: 13.0 sec;   INR: 1.11          PTT - ( 31 Oct 2020 23:30 )  PTT:29.5 sec  Urinalysis Basic - ( 2020 03:13 )    Color: Yellow / Appearance: Clear / S.015 / pH: x  Gluc: x / Ketone: 40 mg/dL  / Bili: NEGATIVE / Urobili: 0.2 E.U./dL   Blood: x / Protein: Trace mg/dL / Nitrite: NEGATIVE   Leuk Esterase: NEGATIVE / RBC: < 5 /HPF / WBC < 5 /HPF   Sq Epi: x / Non Sq Epi: x / Bacteria: Present /HPF      CARDIAC MARKERS ( 31 Oct 2020 23:55 )  <0.017 ng/mL / x     / x     / x     / x      CARDIAC MARKERS ( 31 Oct 2020 23:30 )  x     / x     / 722 U/L / x     / x            RADIOLOGY & ADDITIONAL TESTS: Reviewed

## 2020-11-01 NOTE — PROGRESS NOTE ADULT - PROBLEM SELECTOR PLAN 2
Patient presenting with tachycardia and fever along with lactate, increased anion gap, AST:ALT >2:1, and MOHSEN 305. S/p 6mg Ativan in ED.  CIWA currently 4 (mild headache, mild b/l tremors, mildly anxious).  -[facility] protocol  -Librium taper  -Ativan PRN  -EKG  -monitor electrolytes Patient presenting with tachycardia and fever along with lactate, increased anion gap, AST:ALT >2:1, and MOHSEN 305. S/p 6mg Ativan in ED.  CIWA currently 3 (mild b/l tremors, mildly anxious).  -[facility] protocol  -Librium taper: 25q6hr  -Ativan PRN  -EKG  -monitor electrolytes Patient presenting with tachycardia and fever along with lactate, increased anion gap, AST:ALT >2:1, and MOHSEN 305. S/p 6mg Ativan in ED.  CIWA currently 1/2 (mildly anxious).  -[facility] protocol  -Librium taper: 25q6hr  -Ativan PRN  -EKG  -monitor electrolytes

## 2020-11-01 NOTE — H&P ADULT - NSHPPHYSICALEXAM_GEN_ALL_CORE
LOS:     VITALS:   T(C): 37.6 (11-01-20 @ 06:31), Max: 38 (10-31-20 @ 23:27)  HR: 149 (11-01-20 @ 05:47) (127 - 160)  BP: 131/66 (11-01-20 @ 05:47) (108/53 - 143/70)  RR: 20 (11-01-20 @ 05:47) (16 - 20)  SpO2: 98% (11-01-20 @ 05:47) (93% - 98%)    GENERAL: NAD, lying in bed comfortably, mildly anxious  HEAD:  Atraumatic, Normocephalic  EYES: EOMI, PERRLA, conjunctiva and sclera clear, pupils mildly dilated  ENT: Moist mucous membranes  NECK: Supple, No JVD  CHEST/LUNG: Clear to auscultation bilaterally; No rales, rhonchi, wheezing, or rubs. Unlabored respirations  HEART: Regular rate and rhythm; No murmurs, rubs, or gallops, tachycardia  ABDOMEN: BSx4; Soft, nontender, nondistended  GENITOURINARY: no CVA tenderness, no suprapubic tenderness  BACK: no bondy tenderness down spine  EXTREMITIES:  2+ Peripheral Pulses, brisk capillary refill. No clubbing, cyanosis, or edema  NERVOUS SYSTEM:  A&Ox3, no focal deficits, mild tremor on outstretched arms  SKIN: No rashes or lesions

## 2020-11-01 NOTE — H&P ADULT - PROBLEM SELECTOR PLAN 1
Patient presents after binging on 4 bottles of liquor since last night.  Has a hx of multiple hospitalizations for alcohol withdrawal.  States that he drank 4 bottles of liquor since last night.  -UTox  -Thiamine, Folate, MV

## 2020-11-01 NOTE — ED ADULT NURSE REASSESSMENT NOTE - NS ED NURSE REASSESS COMMENT FT1
CXR being performed at bedside as ordered. Safety and comfort measures maintained, will continue to monitor.

## 2020-11-01 NOTE — H&P ADULT - PROBLEM SELECTOR PROBLEM 4
Patient taking a cruise in January and needs copies of oxygen orders to take with her. (request pended for signatures)     Nutrition, metabolism, and development symptoms

## 2020-11-01 NOTE — PROGRESS NOTE ADULT - PROBLEM SELECTOR PLAN 1
Patient presenting with tachycardia and fever along with lactate, increased anion gap, AST:ALT >2:1, and MOHSEN 305. S/p 6mg Ativan in ED.  CIWA currently 1/2 (mildly anxious).  -[facility] protocol  -Librium taper: 25q6hr  -Ativan PRN  -EKG  -monitor electrolytes Patient presenting with tachycardia and fever along with lactate, increased anion gap, AST:ALT >2:1, and MOHSEN 305. S/p 6mg Ativan in ED. CIWA currently 1 for mild diaphoresis.   - c/w CIWA protocol  - increased librium to 50mg q8h  - Ativan 2mg PRN for CIWA > 8   - c/w folic acid, thiamine, multivitamin  - monitor electrolytes

## 2020-11-01 NOTE — H&P ADULT - HISTORY OF PRESENT ILLNESS
38yo M PMH of alcohol abuse with multiple admissions for withdrawal presents after drinking heavily since last night.  States he came to the hospital because he was drinking and feeling shakey and unable to sleep because of the drinking.  Last drink was yesterday at 9-10pm.  Had 2 episodes of NBNB vomiting yesterday before coming to the hospital.  Has never had DTs.  States that he doesn't drink often but when he does he drinks heavily.  This time he drank 4 bottles of liquor.  Denies sick contacts, recent travel, or hospitalizations.  Denies headaches, hallucinations, loss of consciousness, seizures, fevers, chills, cp, sob, dysuria.  States that his bowel movements are normal consistency but become "dark from the wine" when he drinks.  Denies any blood in his stools.  He does have an appetite.   In the ED:  Initial vital signs: T: 99.3F, 100.4F (rectal), HR: 160, BP: 130/93, R: 18, SpO2: 96% on RA  Labs: significant for WBC 17.77 with neutrophilic predominance, Lactate 9.5, AG 25, AST//97, , MOHSEN 305  CXR: no acute infiltrates (follow up formal read)  EKG: Sinus tachycardia   Medications: Acetaminophen 975mg, CTX 1g, Folic Acid 1g, Thiamine 100mg, Ativan 6mg  Consults: none       40yo M PMH of alcohol abuse with multiple admissions for withdrawal presents after drinking heavily since last night.  States he came to the hospital because he was drinking and feeling shakey and unable to sleep because of the drinking.  Last drink was yesterday at 9-10pm.  Had 2 episodes of NBNB vomiting yesterday before coming to the hospital.  Has never had DTs.  States that he doesn't drink often but when he does he drinks heavily.  This time he drank 4 bottles of liquor.  Denies sick contacts or recent travel.  Denies headaches, hallucinations, loss of consciousness, seizures, fevers, chills, cp, sob, dysuria.  States that his bowel movements are normal consistency but become "dark from the wine" when he drinks.  Denies any blood in his stools.  He does have an appetite.   In the ED:  Initial vital signs: T: 99.3F, 100.4F (rectal), HR: 160, BP: 130/93, R: 18, SpO2: 96% on RA  Labs: significant for WBC 17.77 with neutrophilic predominance, Lactate 9.5, AG 25, AST//97, , MOHSEN 305  CXR: no acute infiltrates (follow up formal read)  EKG: Sinus tachycardia   Medications: Acetaminophen 975mg, CTX 1g, Folic Acid 1g, Thiamine 100mg, Ativan 6mg  Consults: none

## 2020-11-01 NOTE — H&P ADULT - PROBLEM SELECTOR PLAN 2
Patient presenting with tachycardia and fever along with lactate, increased anion gap, AST:ALT >2:1, and MOHSEN 305. S/p 6mg Ativan in ED.  CIWA currently 4 (mild headache, mild b/l tremors, mildly anxious).  -[facility] protocol  -Librium taper  -Ativan PRN  -EKG  -monitor electrolytes

## 2020-11-01 NOTE — ED ADULT NURSE NOTE - CHPI ED NUR SYMPTOMS NEG
no confusion/no disorientation/no nausea/no pain/no vomiting/no weakness/no abdominal distension/no abdominal pain

## 2020-11-01 NOTE — PROGRESS NOTE ADULT - PROBLEM SELECTOR PLAN 1
Patient presents after binging on 4 bottles of liquor since last night.  Has a hx of multiple hospitalizations for alcohol withdrawal.  States that he drank 4 bottles of liquor since last night.  -UTox  -Thiamine, Folate, MV Patient presents after binging on 4 bottles of liquor since last night.  Has a hx of multiple hospitalizations for alcohol withdrawal.    -UTox  - MOHSEN 308  -Thiamine, Folate, MV

## 2020-11-01 NOTE — PROGRESS NOTE ADULT - PROBLEM SELECTOR PLAN 2
Patient presents after binging on 4 bottles of liquor since last night.  Has a hx of multiple hospitalizations for alcohol withdrawal.    -UTox  - MOHSEN 308  -Thiamine, Folate, MV Patient presenting with 3/4 SIRS for fever, tachycardia, and leukocytosis. most likely 2/2 withdrawal  - continue to monitor  - Procalcitonin 0.28 (<0.5) with no obvious signs of infection including negative CXR, so discontinued CTX  - lactate trend: 9.5 -> 5.2 -> 4 s/p 4L NS   - f/u 4pm lactate. If >2 will need additional 1L LR bolus

## 2020-11-01 NOTE — H&P ADULT - ASSESSMENT
Mr. Yee is a 40 yo M with PMH of alcohol abuse and alcohol withdrawals (first in 2018, never any intubations, seizures, or DTs) who presented to [facility] for subjective feelings of malaise and tremulousness, found to be SIRS positive now admitted to [facility] for further management of alcohol withdrawal.

## 2020-11-01 NOTE — H&P ADULT - PROBLEM SELECTOR PLAN 3
Patient presenting with 3/4  SIRS for fever, tachycardia, and leukocytosis.    -c/w 2g CTX  -procal  -blood cx  -glucose  -Echo if unable to locate source of infection

## 2020-11-01 NOTE — ED PROVIDER NOTE - PROGRESS NOTE DETAILS
patient continues to be tachycardic, slow clearance of lactic, case discussed with dr vidales, accepted to tely step down, no focus of infection appreciated, patient is lucid, cooperative and agrees with admission.

## 2020-11-01 NOTE — PROGRESS NOTE ADULT - ASSESSMENT
Mr. Yee is a 40 yo M with PMH of alcohol abuse and alcohol withdrawals (first in 2018, never any intubations, seizures, or DTs) who presented to [facility] for subjective feelings of malaise and tremulousness, found to be SIRS positive, currently on CIWA protocol for alcohol withdrawal monitoring.
Mr. Yee is a 40 yo M with PMH of alcohol abuse and alcohol withdrawals (first in 2018, never any intubations, seizures, or DTs) who presented to [facility] for subjective feelings of malaise and tremulousness, found to be SIRS positive now admitted to [facility] for further management of alcohol withdrawal.

## 2020-11-01 NOTE — PROGRESS NOTE ADULT - ATTENDING COMMENTS
Patient discussed with resident team and plan of care reviewed. I have personally reviewed all pertinent labs and imaging and performed an independent history and physical. Resident note personally reviewed, and I agree with above resident note with the following additions:    39YOM with history of EtOH use disorder, obesity admitted for alcohol withdrawal. Also noted on admission with leukocytosis (no infectious source found, likely concentrated, resolved now) and lactic acidosis likely in setting of severe dehydration related to EtOH use.    CIWAs low though still very tachycardic. Has received 4L IVF already, suspect may be related to his withdrawal rather than volume depletion – will escalate Librium to 50mg tid for now and titrate as able. Needs STAT recheck lactate to ensure normalized. Monitor very closely on CIWA protocol, low threshold to step back up to telemetry if any signs of clinical worsening.

## 2020-11-01 NOTE — PROGRESS NOTE ADULT - PROBLEM SELECTOR PLAN 3
Patient presenting with 3/4  SIRS for fever, tachycardia, and leukocytosis.    -c/w 2g CTX  -procal  -blood cx  -glucose  -Echo if unable to locate source of infection Patient presenting with 3/4  SIRS for fever, tachycardia, and leukocytosis. most likely 2/2 withdrawal  - continue to monitor  - started on cfx, no obvious signs of infection, will consider stopping today  -procal .28  -blood cx  -Echo if unable to locate source of infection Patient presenting with 3/4  SIRS for fever, tachycardia, and leukocytosis. most likely 2/2 withdrawal  - continue to monitor  - started on cfx, no obvious signs of infection, will stop today  -procal .28  -blood cx  - f/u repeat lactate

## 2020-11-01 NOTE — H&P ADULT - NSHPLABSRESULTS_GEN_ALL_CORE
15.7   17.77 )-----------( 288      ( 31 Oct 2020 23:30 )             46.0           140  |  101  |  15  ----------------------------<  88  3.4<L>   |  23  |  0.87    Ca    6.8<L>      2020 03:42  Mg     1.8     10-31    TPro  7.1  /  Alb  3.1<L>  /  TBili  0.8  /  DBili  x   /  AST  119<H>  /  ALT  87<H>  /  AlkPhos  86                Urinalysis Basic - ( 2020 03:13 )    Color: Yellow / Appearance: Clear / S.015 / pH: x  Gluc: x / Ketone: 40 mg/dL  / Bili: NEGATIVE / Urobili: 0.2 E.U./dL   Blood: x / Protein: Trace mg/dL / Nitrite: NEGATIVE   Leuk Esterase: NEGATIVE / RBC: < 5 /HPF / WBC < 5 /HPF   Sq Epi: x / Non Sq Epi: x / Bacteria: Present /HPF        PT/INR - ( 31 Oct 2020 23:30 )   PT: 13.0 sec;   INR: 1.11          PTT - ( 31 Oct 2020 23:30 )  PTT:29.5 sec    Lactate Trend   @ 03:42 Lactate:4.0    @ 02:34 Lactate:5.2   10-31 @ 23:30 Lactate:9.5       CARDIAC MARKERS ( 31 Oct 2020 23:55 )  <0.017 ng/mL / x     / x     / x     / x      CARDIAC MARKERS ( 31 Oct 2020 23:30 )  x     / x     / 722 U/L / x     / x            CAPILLARY BLOOD GLUCOSE      POCT Blood Glucose.: 108 mg/dL (31 Oct 2020 23:32)

## 2020-11-01 NOTE — PROGRESS NOTE ADULT - PROBLEM SELECTOR PLAN 4
F: D5W and ½ NS at 100cc/hr  E: Replete K<4, Mg <2  N: Regular diet  DVT Proph: SCDs  GI Proph: Protonix F: D5W and ½ NS at 100cc/hr  E: Replete K<4, Mg <2  N: Regular diet  DVT Proph: SCDs  GI Proph: Protonix    Dispo: [facility]

## 2020-11-01 NOTE — ED PROVIDER NOTE - CLINICAL SUMMARY MEDICAL DECISION MAKING FREE TEXT BOX
patient arrives in mild alcohol withdrawl with lo grade fever, sepsis initiated, will do full sepsis work up, start abx and fluids. covid and flu testing. start benzo, currently protecting airway and with no distracting injuries. placed on cardiac monitor.

## 2020-11-01 NOTE — H&P ADULT - NSHPSOCIALHISTORY_GEN_ALL_CORE
Alcohol-heavy when does drink, "3 times a year"  Tobacco-20pack year hx of cigarettes; currently uses ECigarette X10 years  Illicit drugs-remote hx of cocaine and marijuana; never IV drugs

## 2020-11-01 NOTE — PROGRESS NOTE ADULT - PROBLEM SELECTOR PLAN 3
Patient presenting with 3/4  SIRS for fever, tachycardia, and leukocytosis. most likely 2/2 withdrawal  - continue to monitor  - started on cfx, no obvious signs of infection, will stop today  -procal .28  -blood cx  - f/u repeat lactate Patient reports smoking ~1/2 ppd (e-cigarette conversion). Requesting nicotine supplement  - c/w nicotine patch

## 2020-11-01 NOTE — H&P ADULT - PROBLEM SELECTOR PLAN 4
F: D5W and ½ NS at 100cc/hr  E: Replete K<4, Mg <2  N: Regular diet  DVT Proph: SCDs  GI Proph: Protonix

## 2020-11-02 ENCOUNTER — TRANSCRIPTION ENCOUNTER (OUTPATIENT)
Age: 40
End: 2020-11-02

## 2020-11-02 VITALS
OXYGEN SATURATION: 96 % | DIASTOLIC BLOOD PRESSURE: 96 MMHG | HEART RATE: 100 BPM | RESPIRATION RATE: 17 BRPM | TEMPERATURE: 98 F | SYSTOLIC BLOOD PRESSURE: 143 MMHG

## 2020-11-02 LAB
ALBUMIN SERPL ELPH-MCNC: 3.9 G/DL — SIGNIFICANT CHANGE UP (ref 3.3–5)
ALP SERPL-CCNC: 98 U/L — SIGNIFICANT CHANGE UP (ref 40–120)
ALT FLD-CCNC: 100 U/L — HIGH (ref 10–45)
ANION GAP SERPL CALC-SCNC: 13 MMOL/L — SIGNIFICANT CHANGE UP (ref 5–17)
AST SERPL-CCNC: 159 U/L — HIGH (ref 10–40)
BASOPHILS # BLD AUTO: 0.01 K/UL — SIGNIFICANT CHANGE UP (ref 0–0.2)
BASOPHILS NFR BLD AUTO: 0.1 % — SIGNIFICANT CHANGE UP (ref 0–2)
BILIRUB SERPL-MCNC: 1.3 MG/DL — HIGH (ref 0.2–1.2)
BUN SERPL-MCNC: 6 MG/DL — LOW (ref 7–23)
CALCIUM SERPL-MCNC: 8.2 MG/DL — LOW (ref 8.4–10.5)
CHLORIDE SERPL-SCNC: 98 MMOL/L — SIGNIFICANT CHANGE UP (ref 96–108)
CO2 SERPL-SCNC: 27 MMOL/L — SIGNIFICANT CHANGE UP (ref 22–31)
CREAT SERPL-MCNC: 0.55 MG/DL — SIGNIFICANT CHANGE UP (ref 0.5–1.3)
EOSINOPHIL # BLD AUTO: 0.01 K/UL — SIGNIFICANT CHANGE UP (ref 0–0.5)
EOSINOPHIL NFR BLD AUTO: 0.1 % — SIGNIFICANT CHANGE UP (ref 0–6)
GLUCOSE SERPL-MCNC: 131 MG/DL — HIGH (ref 70–99)
HCT VFR BLD CALC: 40.8 % — SIGNIFICANT CHANGE UP (ref 39–50)
HGB BLD-MCNC: 13.4 G/DL — SIGNIFICANT CHANGE UP (ref 13–17)
IMM GRANULOCYTES NFR BLD AUTO: 0.4 % — SIGNIFICANT CHANGE UP (ref 0–1.5)
LYMPHOCYTES # BLD AUTO: 3.62 K/UL — HIGH (ref 1–3.3)
LYMPHOCYTES # BLD AUTO: 33.4 % — SIGNIFICANT CHANGE UP (ref 13–44)
MAGNESIUM SERPL-MCNC: 2.1 MG/DL — SIGNIFICANT CHANGE UP (ref 1.6–2.6)
MCHC RBC-ENTMCNC: 27.4 PG — SIGNIFICANT CHANGE UP (ref 27–34)
MCHC RBC-ENTMCNC: 32.8 GM/DL — SIGNIFICANT CHANGE UP (ref 32–36)
MCV RBC AUTO: 83.4 FL — SIGNIFICANT CHANGE UP (ref 80–100)
MONOCYTES # BLD AUTO: 0.4 K/UL — SIGNIFICANT CHANGE UP (ref 0–0.9)
MONOCYTES NFR BLD AUTO: 3.7 % — SIGNIFICANT CHANGE UP (ref 2–14)
NEUTROPHILS # BLD AUTO: 6.76 K/UL — SIGNIFICANT CHANGE UP (ref 1.8–7.4)
NEUTROPHILS NFR BLD AUTO: 62.3 % — SIGNIFICANT CHANGE UP (ref 43–77)
NRBC # BLD: 0 /100 WBCS — SIGNIFICANT CHANGE UP (ref 0–0)
PHOSPHATE SERPL-MCNC: 1.5 MG/DL — LOW (ref 2.5–4.5)
PLATELET # BLD AUTO: 145 K/UL — LOW (ref 150–400)
POTASSIUM SERPL-MCNC: 3.1 MMOL/L — LOW (ref 3.5–5.3)
POTASSIUM SERPL-SCNC: 3.1 MMOL/L — LOW (ref 3.5–5.3)
PROT SERPL-MCNC: 7.5 G/DL — SIGNIFICANT CHANGE UP (ref 6–8.3)
RBC # BLD: 4.89 M/UL — SIGNIFICANT CHANGE UP (ref 4.2–5.8)
RBC # FLD: 13.2 % — SIGNIFICANT CHANGE UP (ref 10.3–14.5)
SODIUM SERPL-SCNC: 138 MMOL/L — SIGNIFICANT CHANGE UP (ref 135–145)
WBC # BLD: 10.84 K/UL — HIGH (ref 3.8–10.5)
WBC # FLD AUTO: 10.84 K/UL — HIGH (ref 3.8–10.5)

## 2020-11-02 PROCEDURE — 81001 URINALYSIS AUTO W/SCOPE: CPT

## 2020-11-02 PROCEDURE — 84484 ASSAY OF TROPONIN QUANT: CPT

## 2020-11-02 PROCEDURE — 99285 EMERGENCY DEPT VISIT HI MDM: CPT | Mod: 25

## 2020-11-02 PROCEDURE — 83036 HEMOGLOBIN GLYCOSYLATED A1C: CPT

## 2020-11-02 PROCEDURE — 96374 THER/PROPH/DIAG INJ IV PUSH: CPT

## 2020-11-02 PROCEDURE — 83735 ASSAY OF MAGNESIUM: CPT

## 2020-11-02 PROCEDURE — 83605 ASSAY OF LACTIC ACID: CPT

## 2020-11-02 PROCEDURE — 84443 ASSAY THYROID STIM HORMONE: CPT

## 2020-11-02 PROCEDURE — 83690 ASSAY OF LIPASE: CPT

## 2020-11-02 PROCEDURE — 85025 COMPLETE CBC W/AUTO DIFF WBC: CPT

## 2020-11-02 PROCEDURE — 84100 ASSAY OF PHOSPHORUS: CPT

## 2020-11-02 PROCEDURE — 85610 PROTHROMBIN TIME: CPT

## 2020-11-02 PROCEDURE — 82550 ASSAY OF CK (CPK): CPT

## 2020-11-02 PROCEDURE — 87040 BLOOD CULTURE FOR BACTERIA: CPT

## 2020-11-02 PROCEDURE — 85730 THROMBOPLASTIN TIME PARTIAL: CPT

## 2020-11-02 PROCEDURE — 84145 PROCALCITONIN (PCT): CPT

## 2020-11-02 PROCEDURE — 0225U NFCT DS DNA&RNA 21 SARSCOV2: CPT

## 2020-11-02 PROCEDURE — 93005 ELECTROCARDIOGRAM TRACING: CPT

## 2020-11-02 PROCEDURE — 80048 BASIC METABOLIC PNL TOTAL CA: CPT

## 2020-11-02 PROCEDURE — 80307 DRUG TEST PRSMV CHEM ANLYZR: CPT

## 2020-11-02 PROCEDURE — 82962 GLUCOSE BLOOD TEST: CPT

## 2020-11-02 PROCEDURE — 36415 COLL VENOUS BLD VENIPUNCTURE: CPT

## 2020-11-02 PROCEDURE — 71045 X-RAY EXAM CHEST 1 VIEW: CPT

## 2020-11-02 PROCEDURE — 96376 TX/PRO/DX INJ SAME DRUG ADON: CPT

## 2020-11-02 PROCEDURE — 99239 HOSP IP/OBS DSCHRG MGMT >30: CPT | Mod: GC

## 2020-11-02 PROCEDURE — 80053 COMPREHEN METABOLIC PANEL: CPT

## 2020-11-02 PROCEDURE — 96375 TX/PRO/DX INJ NEW DRUG ADDON: CPT

## 2020-11-02 RX ORDER — POTASSIUM CHLORIDE 20 MEQ
40 PACKET (EA) ORAL EVERY 4 HOURS
Refills: 0 | Status: COMPLETED | OUTPATIENT
Start: 2020-11-02 | End: 2020-11-02

## 2020-11-02 RX ORDER — SODIUM,POTASSIUM PHOSPHATES 278-250MG
1 POWDER IN PACKET (EA) ORAL ONCE
Refills: 0 | Status: COMPLETED | OUTPATIENT
Start: 2020-11-02 | End: 2020-11-02

## 2020-11-02 RX ADMIN — Medication 1 MILLIGRAM(S): at 12:35

## 2020-11-02 RX ADMIN — Medication 1 PATCH: at 06:51

## 2020-11-02 RX ADMIN — Medication 1 PACKET(S): at 10:27

## 2020-11-02 RX ADMIN — Medication 1 PATCH: at 12:36

## 2020-11-02 RX ADMIN — Medication 1 TABLET(S): at 12:35

## 2020-11-02 RX ADMIN — Medication 40 MILLIEQUIVALENT(S): at 10:26

## 2020-11-02 RX ADMIN — Medication 100 MILLIGRAM(S): at 12:35

## 2020-11-02 RX ADMIN — Medication 50 MILLIGRAM(S): at 02:02

## 2020-11-02 RX ADMIN — Medication 40 MILLIEQUIVALENT(S): at 12:35

## 2020-11-02 RX ADMIN — Medication 50 MILLIGRAM(S): at 10:26

## 2020-11-02 NOTE — DISCHARGE NOTE NURSING/CASE MANAGEMENT/SOCIAL WORK - NSDCFUADDAPPT_GEN_ALL_CORE_FT
1. You report not having a primary care doctor. You are more than welcome to establish care with us at the [facility]. We strongly recommend a follow-up appointment after discharge to discuss alcohol support groups and cutting down on nicotine use. We can be reached at [phone].

## 2020-11-02 NOTE — PROGRESS NOTE ADULT - REASON FOR ADMISSION
Alcohol intoxication with concern for withdrawal

## 2020-11-02 NOTE — DISCHARGE NOTE PROVIDER - HOSPITAL COURSE
#Discharge: do not delete    Mr. Yee is a 38 yo M with PMH of alcohol abuse and alcohol withdrawals (first in 2018, never any intubations, seizures, or DTs) who presented to [facility] for subjective feelings of malaise and tremulousness, found to be SIRS positive, placed on CIWA protocol for alcohol withdrawal monitoring.     Problem List/Inpatient treatment course:   #Alcohol withdrawal.    - Patient presenting with tachycardia and fever along with lactate, increased anion gap, AST:ALT >2:1, and MOHSEN 305. S/p 6mg Ativan in ED. CIWA 1 for mild diaphoresis on day of discharge.   - c/w CIWA protocol  - placed on standium librium 50mg q8h, however, given no need to PRN ativan discontinued on day of discharge  - Ativan 2mg PRN for CIWA > 8   - c/w folic acid, thiamine, multivitamin  - monitor electrolytes    #SIRS (systemic inflammatory response syndrome).    Patient presenting with 3/4 SIRS for fever, tachycardia, and leukocytosis. Most likely 2/2 withdrawal  - Procalcitonin 0.28 (<0.5) with no obvious signs of infection including negative CXR, so discontinued CTX  - lactate trend: 9.5 -> 5.2 -> 4 -> 1.2 s/p 4L NS    #Smoking history.    - Patient reports smoking ~1/2 ppd (e-cigarette conversion). Requesting nicotine supplement  - c/w nicotine patch    New medications: None  Labs to be followed outpatient: None  Exam to be followed outpatient: PCP   #Discharge: do not delete    Mr. Yee is a 38 yo M with PMH of alcohol abuse and alcohol withdrawals (first in 2018, never any intubations, seizures, or DTs) who presented to [facility] for subjective feelings of malaise and tremulousness, found to be SIRS positive, placed on CIWA protocol for alcohol withdrawal monitoring. Had 2 episodes of NBNB vomiting before coming to the hospital.  Has never had DTs. In the ED: T: 99.3F, 100.4F (rectal), HR: 160, BP: 130/93, R: 18, SpO2: 96% on RA. Labs significant for WBC 17.77, Lactate 9.5, AG 25, AST//97, , MOHSEN 305. CXR: no acute infiltrates, EKG: Sinus tachycardia. Given tylenol, ceftriaxone, Folic acid, thiamine, ativan 6mg total , started on librium 25q6hr but later increased to 50 mg q8h for elevated lactate and c/f withdrawal exacerbation. Ceftriaxone dc'd due to no infectious symptoms.     Problem List/Inpatient treatment course:   #Alcohol withdrawal.    - Patient presenting with tachycardia and fever along with lactate, increased anion gap, AST:ALT >2:1, and MOHSEN 305. S/p 6mg Ativan in ED. CIWA 1 for mild diaphoresis on day of discharge.   - c/w CIWA protocol  - placed on standium librium 50mg q8h, however, given no need to PRN ativan discontinued on day of discharge  - Ativan 2mg PRN for CIWA > 8   - c/w folic acid, thiamine, multivitamin  - monitor electrolytes    #SIRS (systemic inflammatory response syndrome).    Patient presenting with 3/4 SIRS for fever, tachycardia, and leukocytosis. Most likely 2/2 withdrawal  - Procalcitonin 0.28 (<0.5) with no obvious signs of infection including negative CXR, so discontinued CTX  - lactate trend: 9.5 -> 5.2 -> 4 -> 1.2 s/p 4L NS    #Smoking history.    - Patient reports smoking ~1/2 ppd (e-cigarette conversion). Requesting nicotine supplement  - c/w nicotine patch    New medications: None  Labs to be followed outpatient: None  Exam to be followed outpatient: PCP   #Discharge: do not delete    Mr. Yee is a 38 yo M with PMH of alcohol abuse and alcohol withdrawals (first in 2018, never any intubations, seizures, or DTs) who presented to [facility] for subjective feelings of malaise and tremulousness, found to be SIRS positive, placed on CIWA protocol for alcohol withdrawal monitoring. Had 2 episodes of NBNB vomiting before coming to the hospital.  Has never had DTs. In the ED: T: 99.3F, 100.4F (rectal), HR: 160, BP: 130/93, R: 18, SpO2: 96% on RA. Labs significant for WBC 17.77, Lactate 9.5, AG 25, AST//97, , MOHSEN 305. CXR: no acute infiltrates, EKG: Sinus tachycardia. Given tylenol, ceftriaxone, Folic acid, thiamine, ativan 6mg total, started on librium 25q6hr but later increased to 50 mg q8h for elevated lactate and c/f withdrawal exacerbation. Ceftriaxone dc'd due to no infectious symptoms.     Problem List/Inpatient treatment course:   #Alcohol withdrawal.    - Patient presenting with tachycardia and fever along with lactate, increased anion gap, AST:ALT >2:1, and MOHSEN 305. S/p 6mg Ativan in ED. CIWA 1 for mild diaphoresis on day of discharge.   - c/w CIWA protocol  - placed on standium librium 50mg q8h, however, given no need to PRN ativan discontinued on day of discharge  - Ativan 2mg PRN for CIWA > 8   - c/w folic acid, thiamine, multivitamin  - monitor electrolytes    #SIRS (systemic inflammatory response syndrome).    Patient presenting with 3/4 SIRS for fever, tachycardia, and leukocytosis. Most likely 2/2 withdrawal  - Procalcitonin 0.28 (<0.5) with no obvious signs of infection including negative CXR, so discontinued CTX  - lactate trend: 9.5 -> 5.2 -> 4 -> 1.2 s/p 4L NS    #Smoking history.    - Patient reports smoking ~1/2 ppd (e-cigarette conversion). Requesting nicotine supplement  - c/w nicotine patch    New medications: None  Labs to be followed outpatient: None  Exam to be followed outpatient: PCP

## 2020-11-02 NOTE — DISCHARGE NOTE NURSING/CASE MANAGEMENT/SOCIAL WORK - PATIENT PORTAL LINK FT
You can access the FollowMyHealth Patient Portal offered by [facility] by registering at the following website: [email or web address]. By joining [employer]’s FollowMyHealth portal, you will also be able to view your health information using other applications (apps) compatible with our system.

## 2020-11-02 NOTE — DISCHARGE NOTE PROVIDER - NSDCFUADDAPPT_GEN_ALL_CORE_FT
1. You report not having a primary care doctor. You are more than welcome to establish care with us at the [facility]. We strongly recommend a follow-up appointment after discharge to discuss alcohol support groups and cutting down on nicotine use. We can be reached at [phone].

## 2020-11-02 NOTE — DISCHARGE NOTE PROVIDER - NSFOLLOWUPCLINICS_GEN_ALL_ED_FT
[facility]  Family Medicine  [address]  [place], [place] [address]  Phone: [phone]  Fax:   Follow Up Time: Routine

## 2020-11-02 NOTE — DISCHARGE NOTE PROVIDER - NSDCCPCAREPLAN_GEN_ALL_CORE_FT
PRINCIPAL DISCHARGE DIAGNOSIS  Diagnosis: Alcohol withdrawal syndrome without complication  Assessment and Plan of Treatment: You came in on Saturday October 30th for subjective feelings of malaise and tremulousness with last alcohol consumption at 9pm that day which consisted of 3 liters of hard liquor and 2 bottles of wine. You report increased alcohol consumption during a one week span and previously remained sober for prolonged periods of time. You denied any history of intubations, seizures, or delerium tremens from alcohol withdrawal. While hospitalized we put you on a CIWA protocol to prevent any seizures and your numbers remained low, notable for mild sweat and tremors. On discharge we strongly recommend joining support groups to reduce alcohol consumption. Not only can it prevent [place] drinking side effects and withdrawals, but abstaining will reduce toxic insults to the liver and can positively affect other psychosocial aspects of life. We also recommend taking daily multivitamins.      SECONDARY DISCHARGE DIAGNOSES  Diagnosis: Smoking hx  Assessment and Plan of Treatment: You report a 20 pack year history of cigarette use and have been usign e-cigarettes for approximately 10 years. While hospitalized we started you on nicotine patches to help reduce some cravings. We strongly suggest you reduce smoking as it can prevent respiratory airway disease and lung cancer. Consider reducing your intake by half and supplementing cravings with nicotine gum or lozenges. Discuss this with your primary care doctor at your next visit.     PRINCIPAL DISCHARGE DIAGNOSIS  Diagnosis: Alcohol withdrawal syndrome without complication  Assessment and Plan of Treatment: You came in on Saturday October 30th for subjective feelings of malaise and tremulousness with last alcohol consumption at 9pm that day which consisted of 3 liters of hard liquor and 2 bottles of wine. You report increased alcohol consumption during a one week span and previously remained sober for prolonged periods of time. You denied any history of intubations, seizures, or delerium tremens from alcohol withdrawal. While hospitalized we put you on a CIWA protocol to prevent any seizures and your numbers remained low, notable for mild sweat and tremors. On discharge we provided you information regarding support groups to reduce alcohol consumption. Not only can it prevent [place] drinking side effects and withdrawals, but abstaining will reduce toxic insults to the liver and can positively affect other psychosocial aspects of life. We also recommend taking daily multivitamins.      SECONDARY DISCHARGE DIAGNOSES  Diagnosis: Smoking hx  Assessment and Plan of Treatment: You report a 20 pack year history of cigarette use and have been using e-cigarettes for approximately 10 years. While hospitalized we started you on nicotine patches to help reduce some cravings. We strongly suggest you reduce smoking as it can prevent respiratory airway disease and lung cancer. Consider reducing your intake by half and supplementing cravings with nicotine gum or lozenges. Discuss this with your primary care doctor at your next visit.

## 2020-11-06 DIAGNOSIS — F10.230 ALCOHOL DEPENDENCE WITH WITHDRAWAL, UNCOMPLICATED: ICD-10-CM

## 2020-11-06 DIAGNOSIS — E86.0 DEHYDRATION: ICD-10-CM

## 2020-11-06 DIAGNOSIS — F17.210 NICOTINE DEPENDENCE, CIGARETTES, UNCOMPLICATED: ICD-10-CM

## 2020-11-06 DIAGNOSIS — Z79.899 OTHER LONG TERM (CURRENT) DRUG THERAPY: ICD-10-CM

## 2020-11-06 DIAGNOSIS — E87.2 ACIDOSIS: ICD-10-CM

## 2020-11-06 DIAGNOSIS — R65.10 SYSTEMIC INFLAMMATORY RESPONSE SYNDROME (SIRS) OF NON-INFECTIOUS ORIGIN WITHOUT ACUTE ORGAN DYSFUNCTION: ICD-10-CM

## 2020-11-06 LAB
CULTURE RESULTS: SIGNIFICANT CHANGE UP
CULTURE RESULTS: SIGNIFICANT CHANGE UP
SPECIMEN SOURCE: SIGNIFICANT CHANGE UP
SPECIMEN SOURCE: SIGNIFICANT CHANGE UP

## 2021-01-02 ENCOUNTER — EMERGENCY (EMERGENCY)
Facility: HOSPITAL | Age: 41
LOS: 1 days | Discharge: HOME | End: 2021-01-02
Attending: EMERGENCY MEDICINE | Admitting: EMERGENCY MEDICINE
Payer: COMMERCIAL

## 2021-01-02 VITALS
DIASTOLIC BLOOD PRESSURE: 98 MMHG | OXYGEN SATURATION: 95 % | HEART RATE: 155 BPM | SYSTOLIC BLOOD PRESSURE: 149 MMHG | RESPIRATION RATE: 18 BRPM | HEIGHT: 67 IN | TEMPERATURE: 99 F

## 2021-01-02 VITALS
RESPIRATION RATE: 18 BRPM | HEART RATE: 132 BPM | OXYGEN SATURATION: 96 % | DIASTOLIC BLOOD PRESSURE: 89 MMHG | SYSTOLIC BLOOD PRESSURE: 138 MMHG

## 2021-01-02 DIAGNOSIS — F10.139 ALCOHOL ABUSE WITH WITHDRAWAL, UNSPECIFIED: ICD-10-CM

## 2021-01-02 LAB
ALBUMIN SERPL ELPH-MCNC: 4.2 G/DL — SIGNIFICANT CHANGE UP (ref 3.4–5)
ALP SERPL-CCNC: 97 U/L — SIGNIFICANT CHANGE UP (ref 40–120)
ALT FLD-CCNC: 64 U/L — HIGH (ref 12–42)
ANION GAP SERPL CALC-SCNC: 18 MMOL/L — HIGH (ref 9–16)
AST SERPL-CCNC: 62 U/L — HIGH (ref 15–37)
BILIRUB DIRECT SERPL-MCNC: 0.2 MG/DL — SIGNIFICANT CHANGE UP
BILIRUB INDIRECT FLD-MCNC: 0.4 MG/DL — SIGNIFICANT CHANGE UP (ref 0.2–1)
BILIRUB SERPL-MCNC: 0.6 MG/DL — SIGNIFICANT CHANGE UP (ref 0.2–1.2)
BUN SERPL-MCNC: 17 MG/DL — SIGNIFICANT CHANGE UP (ref 7–23)
CALCIUM SERPL-MCNC: 8.4 MG/DL — LOW (ref 8.5–10.5)
CHLORIDE SERPL-SCNC: 97 MMOL/L — SIGNIFICANT CHANGE UP (ref 96–108)
CO2 SERPL-SCNC: 23 MMOL/L — SIGNIFICANT CHANGE UP (ref 22–31)
CREAT SERPL-MCNC: 0.92 MG/DL — SIGNIFICANT CHANGE UP (ref 0.5–1.3)
ETHANOL SERPL-MCNC: 340 MG/DL — HIGH
GLUCOSE SERPL-MCNC: 115 MG/DL — HIGH (ref 70–99)
MAGNESIUM SERPL-MCNC: 2.1 MG/DL — SIGNIFICANT CHANGE UP (ref 1.6–2.6)
PHOSPHATE SERPL-MCNC: 2.7 MG/DL — SIGNIFICANT CHANGE UP (ref 2.5–4.5)
POTASSIUM SERPL-MCNC: 3.6 MMOL/L — SIGNIFICANT CHANGE UP (ref 3.5–5.3)
POTASSIUM SERPL-SCNC: 3.6 MMOL/L — SIGNIFICANT CHANGE UP (ref 3.5–5.3)
PROT SERPL-MCNC: 8.8 G/DL — HIGH (ref 6.4–8.2)
SODIUM SERPL-SCNC: 138 MMOL/L — SIGNIFICANT CHANGE UP (ref 132–145)

## 2021-01-02 PROCEDURE — 93010 ELECTROCARDIOGRAM REPORT: CPT | Mod: NC

## 2021-01-02 PROCEDURE — 99284 EMERGENCY DEPT VISIT MOD MDM: CPT

## 2021-01-02 RX ORDER — SODIUM CHLORIDE 9 MG/ML
1000 INJECTION, SOLUTION INTRAVENOUS
Refills: 0 | Status: DISCONTINUED | OUTPATIENT
Start: 2021-01-02 | End: 2021-01-06

## 2021-01-02 RX ORDER — DIAZEPAM 5 MG
5 TABLET ORAL ONCE
Refills: 0 | Status: DISCONTINUED | OUTPATIENT
Start: 2021-01-02 | End: 2021-01-02

## 2021-01-02 RX ORDER — SODIUM CHLORIDE 9 MG/ML
2000 INJECTION INTRAMUSCULAR; INTRAVENOUS; SUBCUTANEOUS ONCE
Refills: 0 | Status: DISCONTINUED | OUTPATIENT
Start: 2021-01-02 | End: 2021-01-06

## 2021-01-02 RX ORDER — DIAZEPAM 5 MG
10 TABLET ORAL ONCE
Refills: 0 | Status: COMPLETED | OUTPATIENT
Start: 2021-01-02 | End: 2021-01-02

## 2021-01-02 RX ADMIN — SODIUM CHLORIDE 500 MILLILITER(S): 9 INJECTION, SOLUTION INTRAVENOUS at 15:24

## 2021-01-02 RX ADMIN — Medication 5 MILLIGRAM(S): at 15:24

## 2021-01-02 RX ADMIN — SODIUM CHLORIDE 500 MILLILITER(S): 9 INJECTION, SOLUTION INTRAVENOUS at 14:21

## 2021-01-02 RX ADMIN — Medication 2 MILLIGRAM(S): at 13:49

## 2021-01-02 RX ADMIN — SODIUM CHLORIDE 1000 MILLILITER(S): 9 INJECTION, SOLUTION INTRAVENOUS at 15:24

## 2021-01-02 RX ADMIN — Medication 100 MILLIGRAM(S): at 13:49

## 2021-01-02 NOTE — ED PROVIDER NOTE - PSYCHIATRIC, MLM
Alert and oriented to person, place, time/situation. normal mood and affect. no apparent risk to self or others. Anxious and tremulous.

## 2021-01-02 NOTE — ED PROVIDER NOTE - PATIENT PORTAL LINK FT
You can access the FollowMyHealth Patient Portal offered by [facility] by registering at the following website: [email or web address]. By joining [employer]’s FollowMyHealth portal, you will also be able to view your health information using other applications (apps) compatible with our system.

## 2021-01-02 NOTE — ED PROVIDER NOTE - NSFOLLOWUPINSTRUCTIONS_ED_ALL_ED_FT
Alcohol Use Disorder    WHAT YOU NEED TO KNOW:    Alcohol use disorder (AUD) is problem drinking. AUD includes alcohol abuse and alcohol dependency.     DISCHARGE INSTRUCTIONS:    Seek care immediately if:     Your heart is beating faster than usual.      You have hallucinations.      You cannot remember what happens while you are drinking.      You have seizures.    Contact your healthcare provider if:     You are anxious and have nausea.      Your hands are shaky and you are sweating heavily.      You have questions or concerns about your condition or care.    Follow up with your healthcare provider as directed: Do not try to stop drinking on your own. Your healthcare provider may need to help you withdraw from alcohol safely. He may need to admit you to the hospital. You may also need any of the following treatments:    Medicines to decrease your craving for alcohol      Support groups such as Alcoholics Anonymous       Therapy from a psychiatrist or psychologist       Admission to an inpatient facility for treatment for severe AUD    Interested in discussing options to reduce your alcohol or drug use?      [facility]: [phone]   [facility] Substance Abuse Services: [phone], option #2   Methadone Maintenance & Ambulatory Opiate Detox: [phone]  [facility]: [phone]  [facility]: [phone]  [facility]: [phone]    [facility]: [phone], option #2   [facility]: [phone]    [facility]: [phone]    [facility] Central Intake: [phone]  [facility] Chemical Dependency/Ancillary Withdrawal: [phone]  [facility] Methadone Maintenance: [phone]    [facility]: [phone]  [facility] Addiction Treatment Services: [phone]    [facility]: 2-483-3-HOPENY    [facility] Office of Alcoholism and Substance Abuse Services (OASAS): https://www.oasas.[place].gov/providerdirectory/  [facility] for Addiction Services and Psychotherapy Interventions Research (CASPIR)  www.[facility].org     Interested in discussing options to reduce your tobacco use?    [facility] for Tobacco Control:  [phone]  [facility] QUITLINE: 6-082-DM-QUITS ([phone])    Interested in learning more about substance use?      http://rethinkingdrinking.niaaa.nih.gov   https://www.drugabuse.gov/patients-families     Learn more about opioid overdose prevention programs in [facility]:  http://www.[facility].ny.[facility]/diseases/aids/general/opioid_overdose_prevention/

## 2021-01-02 NOTE — ED ADULT TRIAGE NOTE - CHIEF COMPLAINT QUOTE
pt states "I think Im having etoh withdrawals." Reports going on a hard liquor binge for the last week. States he feels shaky and has slight headache. Last drink was last night.

## 2021-01-02 NOTE — ED PROVIDER NOTE - PROGRESS NOTE DETAILS
patient requiring benzos, requeted more so ordered diazepam 10 and told pt I recommend admission, nurse called me because patient eloped, was able to intercept pt in lobby, reused admssion, agreeable to sign ama The patient wishes to leave against medical advice.  I have discussed the risks, benefits and alternatives (including the possibility of worsening of disease, pain, permanent disability, and/or death) with the patient and his/her family (if available).  The patient voices understanding of these risks, benefits, and alternatives and still wishes to sign out against medical advice.  The patient is awake, alert, oriented  x 3 and has demonstrated capacity to refuse/direct care.  I have advised the patient that they can and should return immediately should they develop any worse/different/additional symptoms, or if they change their mind and want to continue their care.

## 2021-01-02 NOTE — ED ADULT NURSE NOTE - NSIMPLEMENTINTERV_GEN_ALL_ED
Implemented All Universal Safety Interventions:  [place] to call system. Call bell, personal items and telephone within reach. Instruct patient to call for assistance. Room bathroom lighting operational. Non-slip footwear when patient is off stretcher. Physically safe environment: no spills, clutter or unnecessary equipment. Stretcher in lowest position, wheels locked, appropriate side rails in place.

## 2021-01-02 NOTE — ED ADULT TRIAGE NOTE - IDEAL BODY WEIGHT(KG)
VIDEO VISIT PROGRESS NOTE  This visit was performed via live interactive two-way video.    During their visit, the patient was informed that their consent to treat includes permission to submit claims to their insurance on their behalf for the services received.  Clinician Location: [facility], [address] [place] [address]    Patient Location: Home    Chief Complaint  Transitional Care Management (Hospital Discharge Follow-Up), Transitional Care Management (for stroke like symptoms. He was discharged from the hospital on 11/16/20), and Video Visit    HISTORY OF PRESENT ILLNESS  Social Determinants  Trend Vitals        Tirso Valentin is a pleasant 50 year old male who is requesting a Video Visit who had concerns including Transitional Care Management (Hospital Discharge Follow-Up), Transitional Care Management (for stroke like symptoms. He was discharged from the hospital on 11/16/20), and Video Visit.    The patient is still getting the dizziness.  He also states that the arm feels like pins and needles.  It starts at the neck and chest area then goes all the way down the arm.  He says it feels like “carpal tunnel”.  He says that he probably had that on the right arm before but not the left.  Patient did have a history of vertigo but feels like his dizziness is worsening.  He denies any classic COVID symptoms but he has been tested positive and his quarantining.    Social history:   Social History Narrative    (none)    Tobacco, alcohol and other:  reports that he has never smoked. He quit smokeless tobacco use about 2 years ago.  His smokeless tobacco use included chew. He reports current alcohol use. He reports that he does not use drugs.    REVIEW OF SYSTEMS  All other pertinent systems are reviewed and are negative except as documented above.     HISTORIES  I have personally reviewed and updated the following electronic medical record sections: Current medications, Allergies, Problem list, Past Medical  History, Past Surgical History, Social History and Family History      MEDICATIONS  The medication list in the medical record as well as updates to the medication list submitted by the patient with this V-Visit were reviewed and updated today.       PHYSICAL EXAM     Vital Signs Per Patient:        Constitutional: well-nourished, well-developed, well-appearing  Ears, nose, mouth, throat: normocephalic, atraumatic, external ears normal by inspection  Eyes: no proptosis, extra-ocular eye movement intact, normal sclerae, conjunctivae not injected  Neck: No visible goiter, range of motion of neck appears normal  Respiratory: No increased respiratory effort  Skin: no visible rash  Psychiatric: non-anxious, normal affect      ASSESSMENT AND PLAN   Tirso Valentin is a pleasant 50 year old male we discussed the following:    There are no diagnoses linked to this encounter.     Paresthesia of left arm  (primary encounter diagnosis)  Vertigo     There is a possibility of cervical radiculopathy.  There is also possibility of a neuropathy within the extremities such as a median neuropathy.  We will be burst in him with steroids and reassessing in about 10 days.  Further results may need to be elliceted from EMG or neurology consult etc.       Follow Up: we will call in 10 days for update  No follow-ups on file.    Upcoming Appointments Already Scheduled  Future Appointments   Date Time Provider Department Center   11/30/2020  9:20 AM TWR ACL LAB ACLTWO [facility]   12/2/2020  8:20 AM [facility]   12/8/2020  9:00 AM SHERRON Yee BCEMCAUD [facility]   12/8/2020  9:30 AM Olivia Rothman NP [facility]EMCENT [facility]   4/27/2021  8:30 AM Remington Gonzalez MD [facility]       Remington Gonzalez MD Family Medicine     67

## 2021-01-02 NOTE — ED PROVIDER NOTE - OBJECTIVE STATEMENT
40 year old Male with a PMHx of alcoholism presents to the ED with shaking and unable to sleep. Pt states he wants to stop drinking. He denies a history of DT and withdrawal seizures. Pt was last admitted on 11/1/2020 for alcohol withdrawal. Denies SI/HI, visual hallucinations, auditory hallucinations, headache, and N/V/D.

## 2021-01-02 NOTE — ED ADULT NURSE REASSESSMENT NOTE - NS ED NURSE REASSESS COMMENT FT1
Pt rcmillie from [facility].  Pt on CCM, pending fluid administration.

## 2021-01-02 NOTE — ED ADULT NURSE NOTE - EXPLANATION OF PATIENT'S REASON FOR LEAVING
did wish to stay any longer. Dr Germain explained the risks of leaving AMA. Pt verbalised understanding.

## 2021-01-02 NOTE — ED ADULT NURSE REASSESSMENT NOTE - NS ED NURSE REASSESS COMMENT FT1
PT refused to stay to be admitted or at least monitored and given more medications. Pt aware of the risks. AMA form signed.

## 2021-01-05 LAB — DRUG SCREEN, SERUM: SIGNIFICANT CHANGE UP

## 2021-01-12 ENCOUNTER — EMERGENCY (EMERGENCY)
Facility: HOSPITAL | Age: 41
LOS: 1 days | Discharge: HOME | End: 2021-01-12
Attending: EMERGENCY MEDICINE | Admitting: EMERGENCY MEDICINE
Payer: COMMERCIAL

## 2021-01-12 VITALS
WEIGHT: 229.94 LBS | RESPIRATION RATE: 18 BRPM | HEIGHT: 67 IN | OXYGEN SATURATION: 98 % | SYSTOLIC BLOOD PRESSURE: 109 MMHG | TEMPERATURE: 98 F | DIASTOLIC BLOOD PRESSURE: 76 MMHG | HEART RATE: 134 BPM

## 2021-01-12 DIAGNOSIS — F10.239 ALCOHOL DEPENDENCE WITH WITHDRAWAL, UNSPECIFIED: ICD-10-CM

## 2021-01-12 DIAGNOSIS — Z20.822 CONTACT WITH AND (SUSPECTED) EXPOSURE TO COVID-19: ICD-10-CM

## 2021-01-12 LAB
ALBUMIN SERPL ELPH-MCNC: 3.7 G/DL — SIGNIFICANT CHANGE UP (ref 3.4–5)
ALP SERPL-CCNC: 103 U/L — SIGNIFICANT CHANGE UP (ref 40–120)
ALT FLD-CCNC: 505 U/L — HIGH (ref 12–42)
ANION GAP SERPL CALC-SCNC: 22 MMOL/L — HIGH (ref 9–16)
AST SERPL-CCNC: 625 U/L — HIGH (ref 15–37)
BASOPHILS # BLD AUTO: 0.01 K/UL — SIGNIFICANT CHANGE UP (ref 0–0.2)
BASOPHILS NFR BLD AUTO: 0.2 % — SIGNIFICANT CHANGE UP (ref 0–2)
BILIRUB SERPL-MCNC: 3.5 MG/DL — HIGH (ref 0.2–1.2)
BUN SERPL-MCNC: 16 MG/DL — SIGNIFICANT CHANGE UP (ref 7–23)
CALCIUM SERPL-MCNC: 8.3 MG/DL — LOW (ref 8.5–10.5)
CHLORIDE SERPL-SCNC: 87 MMOL/L — LOW (ref 96–108)
CO2 SERPL-SCNC: 20 MMOL/L — LOW (ref 22–31)
CREAT SERPL-MCNC: 0.95 MG/DL — SIGNIFICANT CHANGE UP (ref 0.5–1.3)
EOSINOPHIL # BLD AUTO: 0.01 K/UL — SIGNIFICANT CHANGE UP (ref 0–0.5)
EOSINOPHIL NFR BLD AUTO: 0.2 % — SIGNIFICANT CHANGE UP (ref 0–6)
ETHANOL SERPL-MCNC: 314 MG/DL — HIGH
GLUCOSE SERPL-MCNC: 93 MG/DL — SIGNIFICANT CHANGE UP (ref 70–99)
HCT VFR BLD CALC: 40.9 % — SIGNIFICANT CHANGE UP (ref 39–50)
HGB BLD-MCNC: 14.2 G/DL — SIGNIFICANT CHANGE UP (ref 13–17)
IMM GRANULOCYTES NFR BLD AUTO: 0.3 % — SIGNIFICANT CHANGE UP (ref 0–1.5)
LYMPHOCYTES # BLD AUTO: 1.51 K/UL — SIGNIFICANT CHANGE UP (ref 1–3.3)
LYMPHOCYTES # BLD AUTO: 23.1 % — SIGNIFICANT CHANGE UP (ref 13–44)
MAGNESIUM SERPL-MCNC: 2.2 MG/DL — SIGNIFICANT CHANGE UP (ref 1.6–2.6)
MCHC RBC-ENTMCNC: 27.5 PG — SIGNIFICANT CHANGE UP (ref 27–34)
MCHC RBC-ENTMCNC: 34.7 GM/DL — SIGNIFICANT CHANGE UP (ref 32–36)
MCV RBC AUTO: 79.3 FL — LOW (ref 80–100)
MONOCYTES # BLD AUTO: 0.46 K/UL — SIGNIFICANT CHANGE UP (ref 0–0.9)
MONOCYTES NFR BLD AUTO: 7 % — SIGNIFICANT CHANGE UP (ref 2–14)
NEUTROPHILS # BLD AUTO: 4.52 K/UL — SIGNIFICANT CHANGE UP (ref 1.8–7.4)
NEUTROPHILS NFR BLD AUTO: 69.2 % — SIGNIFICANT CHANGE UP (ref 43–77)
NRBC # BLD: 0 /100 WBCS — SIGNIFICANT CHANGE UP (ref 0–0)
PLATELET # BLD AUTO: 126 K/UL — LOW (ref 150–400)
POTASSIUM SERPL-MCNC: 4.2 MMOL/L — SIGNIFICANT CHANGE UP (ref 3.5–5.3)
POTASSIUM SERPL-SCNC: 4.2 MMOL/L — SIGNIFICANT CHANGE UP (ref 3.5–5.3)
PROT SERPL-MCNC: 8.1 G/DL — SIGNIFICANT CHANGE UP (ref 6.4–8.2)
RBC # BLD: 5.16 M/UL — SIGNIFICANT CHANGE UP (ref 4.2–5.8)
RBC # FLD: 14.6 % — HIGH (ref 10.3–14.5)
SODIUM SERPL-SCNC: 129 MMOL/L — LOW (ref 132–145)
WBC # BLD: 6.53 K/UL — SIGNIFICANT CHANGE UP (ref 3.8–10.5)
WBC # FLD AUTO: 6.53 K/UL — SIGNIFICANT CHANGE UP (ref 3.8–10.5)

## 2021-01-12 PROCEDURE — 99284 EMERGENCY DEPT VISIT MOD MDM: CPT

## 2021-01-12 PROCEDURE — 93010 ELECTROCARDIOGRAM REPORT: CPT | Mod: NC

## 2021-01-12 RX ORDER — SODIUM CHLORIDE 9 MG/ML
1000 INJECTION INTRAMUSCULAR; INTRAVENOUS; SUBCUTANEOUS ONCE
Refills: 0 | Status: COMPLETED | OUTPATIENT
Start: 2021-01-12 | End: 2021-01-12

## 2021-01-12 RX ADMIN — SODIUM CHLORIDE 1000 MILLILITER(S): 9 INJECTION INTRAMUSCULAR; INTRAVENOUS; SUBCUTANEOUS at 23:26

## 2021-01-12 RX ADMIN — Medication 2 MILLIGRAM(S): at 23:27

## 2021-01-12 NOTE — ED PROVIDER NOTE - CLINICAL SUMMARY MEDICAL DECISION MAKING FREE TEXT BOX
Pt with alcohol withdrawal symptoms, tachycardic, last drink 24 hrs ago, prior admissions but no hx of seizures. Will get labs, check lytes, treat w ativan IV and reassess.

## 2021-01-12 NOTE — ED PROVIDER NOTE - NEUROLOGICAL, MLM
Alert and oriented, no focal deficits, slight tremor in hands when extended, no tongue fasciculation

## 2021-01-12 NOTE — ED PROVIDER NOTE - OBJECTIVE STATEMENT
41 yo male, hx of alcohol abuse, presents w withdrawal symptoms, has been drinking every day and stopped last night around 9 pm. Last drinking approx 24 hrs ago. Old chart review shows 2 prior visits for similar. One prior admission in Oct 2020. One prior visit in the last several weeks and pt signed out AMA after getting tx and being offered admission. no nausea, no vomiting, no HA, no neck pain, no falls.

## 2021-01-12 NOTE — ED PROVIDER NOTE - PROGRESS NOTE DETAILS
pt endorsed to me for possible withdrawal sx, was given ativen, fluids, pending reassessment, noted metabolic derangement, w/ elevated gap, pt denies nv, no tremors, no tongue fasciculations, no diaphoresis, etoh level 300s, ao x 3, not classically c/w alcohol withdrawal, lactate added, urine/tox also added, possible starvation ketosis, and type b lactic acidosis. elevated lactate, in setting of hx of alcohol use, elevated lft, more c/w type b lactic acidosis, as pt denies any pain/cough/sob/uri sx/skin rash, and has no fever or leukocytosis.  dextrose 10% NS started as well.  will rpt labs and reassess downtrending lactate but metabolic panel continues to be deranged, pt reports making urine, but noted ketone, and somewhat elevated specific gravity, will continue fluid hydration.  pt adamantly denies any other ingestion.  denies cp/sob/abd pain/nv. downtrending lactate but metabolic panel continues to be deranged, pt reports making urine, but noted ketone, and somewhat elevated specific gravity, will continue fluid hydration.  pt adamantly denies any other ingestion.  denies cp/sob/abd pain/nv.  HR still in 120s, given metabolic derangement and abnormal VS, decision made to admit for further workup.  pt denies cp/sob/pleurisy, no hypoxia, no tachypnea, low suspicion for PE. Pt informed of all lab and radiology results, demonstrates understanding of results.  Pt wants to be discharged despite being advised STRONGLY and REPEATEDLY to stay for admission.  A&Ox3, speaking clearly and coherently, demonstrates decision-making capacity.  Pt understands that the risks of leaving include, but not limited to, death and/or disability; pt accepts these risks.  pt understands to return as soon as possible for persistence or any worsening of symptoms. Pt given opportunity to ask any and all questions.  Pt understands that the ER diagnosis being given today is a preliminary one, and, like many ER diagnoses, is a preliminary impression, often based on limited information, that may change as current Sx's evolve or new Sx's develop.

## 2021-01-12 NOTE — ED ADULT NURSE NOTE - OBJECTIVE STATEMENT
40y male presents to ED c/o alcohol withdrawal. Pt states he normally drinks 3 pints a day, and last drink was 9pm last night. Pt states today at work, he kept making mistakes and felt unable to focus. Currently feeling anxious. No hx of seizure. PMH of HTN, stopped taking BP meds years ago. A&Ox4.

## 2021-01-12 NOTE — ED ADULT TRIAGE NOTE - CHIEF COMPLAINT QUOTE
Pt BIBA c/o "alcohol withdrawal," last drink yesterday. Pt c/o anxiety and shakiness. Pt seen here for similar complaint 10 days ago. Pt requesting room and states he is in "desperate need of an IV."

## 2021-01-12 NOTE — ED PROVIDER NOTE - PATIENT PORTAL LINK FT
You can access the FollowMyHealth Patient Portal offered by [facility] by registering at the following website: [email or web address]. By joining [employer]’s FollowMyHealth portal, you will also be able to view your health information using other applications (apps) compatible with our system.

## 2021-01-12 NOTE — ED ADULT NURSE NOTE - EXTENSIONS OF SELF_ADULT
"ID: 30yo WM with PDD, Impulse Control d/o, anxiety d/o nos. Last saw RENNY Murillo 5/2016.     CC: anxiety    Interim Hx: presents on time with his mother and a new caregiver    2 new caregivers have started work and the pt's mother has put cameras in the house. No concerns and she feels that both new caregivers are doing good work and seem eager to learn.     Ru has baseline anxiety but there have not been any instances of sexual inappropriateness in public and he has been able to enjoy and engage in all of his previous activities including exercise classes and food bank and scouts and [employer].     Per pt's mother, "i think we're ok. i'd like to hold off for now." - will cont w/o meds. Again, we discuss the r/b assessment of medxns for ru. Discuss options of luvox with an eye to OCD sxs, or Paxil with an eye on decreasing libido (although today the report is that this has not been a recent issue).     Will cont to observe.    On Psychiatric ROS:    Endorses good sleep- not taking anything for sleep (sleep better on busier days; when bored he tends to nap), denies anhedonia, denies feeling helpless/hopeless, denies dec energy, denies dec concentration, dec appetite- has lost 7lbs through portion control since 1/2017, denies dec PMA, denies thoughts of SI/intent/plan.     endorses feeling LESS easily overwhelmed- now better with scheduled in place. +ruminative thinking- chronic, denies feeling tense/"on edge"  Once "fixated" on a topic, struggles to move on. Has some compulsory behaviors assoc with obsessive thinking.     PPHx: Endorses h/o self injury- will pick at scabs in sort of obsessive way- this has inc'd in last week with loss of caregiver.  Denies inpt psych hospitalization  Denies h/o suicide attempt     Current Psych Meds: **Trileptal 300mg po BID through neuro for sz d/o, xanax 0.25mg po daily PRN anxiety  Past Psych Meds: s/e's to mult prev meds to include zyprexa (wgt gain), lexapro 20mg po qam " "(anxiety and to suppress sex drive), buspar 10mg po BID    PMHx: obesity, onel w/ cipap, seizure d/o, scoliosis surgery at 14yo led to hemiparalysis- now walking but has neurogenic bladder and frequent catheterization, partial nephrectomy R kidney    SubstHx:   T- none  E- none  D- none  Caffeine- very rare, if ever    FamPHx: mAunt- schizophrenic, mcousin- schizophrenia    Musculoskeletal:  Muscle strength/Tone: no dyskinesia/ no tremor  Gait/Station- non antalgic, no assistance needed    MSE: appears stated age, well groomed, obese, appropriate dress- shorts/tshirt, engages well with examiner at a childlike level.  Good e/c. Speech reg rate and vol, nonpressured. lmtd spontaneous speech today. Mood is "feeling good" Affect congruent- sits calmly in chair although he does not smile today and is not offering spontaneous speech. Oriented to month and season. Narrative memory intact. Intellectual function is below avg based on vocab and basic fund of knowledge- PDD long h/o sp ed. Thought is perseverative on my name and then later on his birthday. No tangentiality or circumstantiality. No FOI/WES. Denies SI/HI. Denies A/VH. No evidence of delusions. Insight lacking and Judgment in keeping with insight.     Blood pressure (!) 150/75, pulse 74, height 5' 10" (1.778 m), weight 126.3 kg (278 lb 8 oz).    Suicide Risk Assessment:   Protective- age, no prior attempts, no prior hospitalizations, no family h/o attempts, no ongoing substance abuse, no psychosis, denies SI/intent/plan, no h/o violence, seeking treatment, access to treatment, future oriented, good primary support, no access to firearms    Risk- race, gender, ongoing Axis I sxs    **Pt is at LOW imminent and long term risk of suicide given current risk factors.    Assessment:  33yo WM with PDD, Impulse Control d/o, anxiety d/o nos. Last saw RENYN Murillo 5/2016. Pt presented initially with his mother and caregiver Helen who has been with the pt x 8mos. Currently the " "pt is doing very well. Of note pt is only on lexapro for anxiety, is also on trileptal 300mg po BID for seizure d/o through neuro and has HALEY on cipap. Has great family support, receives 88hrs per week of direct care from outside service provider, has assistance with all ADLs and IADLs but seems content with arrangement. No h/o violent or aggressive behavior. Had manic sxs with psychosis when on keppra but never before or since. Has recently stopped zyprexa due to significant weight gain and there has been no increase in behavior or anxiety. Per parent and caregiver there is some concern that a prev caregiver who was recently fired was motivated for the pt to be more sedated-  they both denied the pt needed any change in medication. Hussein is doing well, has some instances of intrusive behavior with young attractive women and he becomes difficult to redirect- added a trial of PRN buspar for days he has public outings- this has been effective and helpful. Today the incidents have become more difficult to redirect- will order a trial of xanax 0.25-0.5mg po daily PRN and also inc buspar PRN to 10mg daily as needed for public outings. Today on f/u pt's mother does not believe buspar has been effective- xanax effective but cannot be given in the moment as "it just happens so fast"- inability to redirect pt in public has become a safety concern for women and for the pt as authorities are not typically well trained in spectrum disorders (mom working to educate the police force). Warrants a transition to new ssri- will taper off lexapro and likely start luvox at the next appt. Last appt on f/u he is "no worse" without the meds- mom motivated to cont without as they cont to observe. Anxiety was mildly inc'd but hard to attribute to lack of med as his primary caregiver just left work abruptly with pregnancy complications- change of schedule and personnel hard for Hussein. Today on f/u there are 2 new providers, doing well " with both, mom has placed cameras and she has no concerns about the new hires. Routine back in place and ru continues with anxiety but is functioning well- mom would like to cont w/o meds. Will cont to observe. No acute safety concerns. rtc 3mos per pt/family preference- mom will contact me PRN if sxs worsen.    Axis I: OCD, Anxiety d/o NOS, Impulse control d/o  Axis II: Pervasive Dvpmtl D/O  Axis III: HALEY on cipap, seizure d/o  Axis IV: chronic mental health, dependent for caregiving  Axis V: GAF 50    Plan:   1. No SSRI mgmt at this time  2. Cont Trileptal 300mg po BID per neuro  3. Cont xanax 0.25-0.5mg po daily PRN anxiety (rare use)  4. RTC 2mos    -Spent 30min face to face with the pt; >50% time spent in counseling   -Supportive therapy and psychoeducation provided  -R/B/SE's of medications discussed with the pt who expresses understanding and chooses to take medications as prescribed.   -Pt instructed to call clinic, 911 or go to nearest emergency room if sxs worsen or pt is in   crisis. The pt expresses understanding.     None

## 2021-01-13 VITALS — RESPIRATION RATE: 18 BRPM | HEART RATE: 121 BPM | OXYGEN SATURATION: 97 %

## 2021-01-13 LAB
ALBUMIN SERPL ELPH-MCNC: 3.1 G/DL — LOW (ref 3.4–5)
ALBUMIN SERPL ELPH-MCNC: 3.2 G/DL — LOW (ref 3.4–5)
ALP SERPL-CCNC: 92 U/L — SIGNIFICANT CHANGE UP (ref 40–120)
ALP SERPL-CCNC: 92 U/L — SIGNIFICANT CHANGE UP (ref 40–120)
ALT FLD-CCNC: 418 U/L — HIGH (ref 12–42)
ALT FLD-CCNC: 458 U/L — HIGH (ref 12–42)
AMPHET UR-MCNC: NEGATIVE — SIGNIFICANT CHANGE UP
ANION GAP SERPL CALC-SCNC: 17 MMOL/L — HIGH (ref 9–16)
ANION GAP SERPL CALC-SCNC: 18 MMOL/L — HIGH (ref 9–16)
ANION GAP SERPL CALC-SCNC: 20 MMOL/L — HIGH (ref 9–16)
APAP SERPL-MCNC: <2 UG/ML — LOW (ref 10–30)
APPEARANCE UR: CLEAR — SIGNIFICANT CHANGE UP
AST SERPL-CCNC: 544 U/L — HIGH (ref 15–37)
AST SERPL-CCNC: 547 U/L — HIGH (ref 15–37)
BACTERIA # UR AUTO: PRESENT /HPF
BARBITURATES UR SCN-MCNC: NEGATIVE — SIGNIFICANT CHANGE UP
BENZODIAZ UR-MCNC: POSITIVE
BILIRUB DIRECT SERPL-MCNC: SIGNIFICANT CHANGE UP MG/DL
BILIRUB INDIRECT FLD-MCNC: SIGNIFICANT CHANGE UP MG/DL (ref 0.2–1)
BILIRUB SERPL-MCNC: 2.9 MG/DL — HIGH (ref 0.2–1.2)
BILIRUB SERPL-MCNC: 3.4 MG/DL — HIGH (ref 0.2–1.2)
BILIRUB SERPL-MCNC: 3.4 MG/DL — HIGH (ref 0.2–1.2)
BILIRUB UR-MCNC: ABNORMAL
BUN SERPL-MCNC: 15 MG/DL — SIGNIFICANT CHANGE UP (ref 7–23)
BUN SERPL-MCNC: 16 MG/DL — SIGNIFICANT CHANGE UP (ref 7–23)
BUN SERPL-MCNC: 17 MG/DL — SIGNIFICANT CHANGE UP (ref 7–23)
CALCIUM SERPL-MCNC: 7.5 MG/DL — LOW (ref 8.5–10.5)
CALCIUM SERPL-MCNC: 7.8 MG/DL — LOW (ref 8.5–10.5)
CALCIUM SERPL-MCNC: 8.1 MG/DL — LOW (ref 8.5–10.5)
CHLORIDE SERPL-SCNC: 86 MMOL/L — LOW (ref 96–108)
CHLORIDE SERPL-SCNC: 91 MMOL/L — LOW (ref 96–108)
CHLORIDE SERPL-SCNC: 92 MMOL/L — LOW (ref 96–108)
CO2 SERPL-SCNC: 20 MMOL/L — LOW (ref 22–31)
CO2 SERPL-SCNC: 21 MMOL/L — LOW (ref 22–31)
CO2 SERPL-SCNC: 22 MMOL/L — SIGNIFICANT CHANGE UP (ref 22–31)
COCAINE METAB.OTHER UR-MCNC: NEGATIVE — SIGNIFICANT CHANGE UP
COLOR SPEC: YELLOW — SIGNIFICANT CHANGE UP
COMMENT - URINE: SIGNIFICANT CHANGE UP
CREAT SERPL-MCNC: 0.89 MG/DL — SIGNIFICANT CHANGE UP (ref 0.5–1.3)
CREAT SERPL-MCNC: 0.98 MG/DL — SIGNIFICANT CHANGE UP (ref 0.5–1.3)
CREAT SERPL-MCNC: 1.03 MG/DL — SIGNIFICANT CHANGE UP (ref 0.5–1.3)
DIFF PNL FLD: ABNORMAL
GLUCOSE SERPL-MCNC: 157 MG/DL — HIGH (ref 70–99)
GLUCOSE SERPL-MCNC: 383 MG/DL — HIGH (ref 70–99)
GLUCOSE SERPL-MCNC: 79 MG/DL — SIGNIFICANT CHANGE UP (ref 70–99)
GLUCOSE UR QL: >=1000
HYALINE CASTS # UR AUTO: SIGNIFICANT CHANGE UP /LPF (ref 0–2)
KETONES UR-MCNC: >=80 MG/DL
LACTATE SERPL-SCNC: 3.2 MMOL/L — HIGH (ref 0.4–2)
LACTATE SERPL-SCNC: 3.8 MMOL/L — HIGH (ref 0.4–2)
LACTATE SERPL-SCNC: 4.4 MMOL/L — CRITICAL HIGH (ref 0.4–2)
LEUKOCYTE ESTERASE UR-ACNC: NEGATIVE — SIGNIFICANT CHANGE UP
METHADONE UR-MCNC: NEGATIVE — SIGNIFICANT CHANGE UP
NITRITE UR-MCNC: NEGATIVE — SIGNIFICANT CHANGE UP
OPIATES UR-MCNC: NEGATIVE — SIGNIFICANT CHANGE UP
PCO2 BLDV: 38 MMHG — LOW (ref 41–51)
PCP SPEC-MCNC: SIGNIFICANT CHANGE UP
PCP UR-MCNC: NEGATIVE — SIGNIFICANT CHANGE UP
PH BLDV: 7.36 — SIGNIFICANT CHANGE UP (ref 7.32–7.43)
PH UR: 6 — SIGNIFICANT CHANGE UP (ref 5–8)
PO2 BLDV: 38 MMHG — SIGNIFICANT CHANGE UP (ref 35–40)
POTASSIUM SERPL-MCNC: 3.4 MMOL/L — LOW (ref 3.5–5.3)
POTASSIUM SERPL-MCNC: 3.4 MMOL/L — LOW (ref 3.5–5.3)
POTASSIUM SERPL-MCNC: 3.5 MMOL/L — SIGNIFICANT CHANGE UP (ref 3.5–5.3)
POTASSIUM SERPL-SCNC: 3.4 MMOL/L — LOW (ref 3.5–5.3)
POTASSIUM SERPL-SCNC: 3.4 MMOL/L — LOW (ref 3.5–5.3)
POTASSIUM SERPL-SCNC: 3.5 MMOL/L — SIGNIFICANT CHANGE UP (ref 3.5–5.3)
PROT SERPL-MCNC: 6.8 G/DL — SIGNIFICANT CHANGE UP (ref 6.4–8.2)
PROT SERPL-MCNC: 7.2 G/DL — SIGNIFICANT CHANGE UP (ref 6.4–8.2)
PROT UR-MCNC: ABNORMAL MG/DL
RBC CASTS # UR COMP ASSIST: < 5 /HPF — SIGNIFICANT CHANGE UP
SAO2 % BLDV: 66 % — SIGNIFICANT CHANGE UP
SARS-COV-2 RNA SPEC QL NAA+PROBE: SIGNIFICANT CHANGE UP
SODIUM SERPL-SCNC: 127 MMOL/L — LOW (ref 132–145)
SODIUM SERPL-SCNC: 129 MMOL/L — LOW (ref 132–145)
SODIUM SERPL-SCNC: 131 MMOL/L — LOW (ref 132–145)
SP GR SPEC: 1.01 — SIGNIFICANT CHANGE UP (ref 1–1.03)
THC UR QL: NEGATIVE — SIGNIFICANT CHANGE UP
UROBILINOGEN FLD QL: 0.2 E.U./DL — SIGNIFICANT CHANGE UP
WBC UR QL: < 5 /HPF — SIGNIFICANT CHANGE UP

## 2021-01-13 RX ORDER — SODIUM CHLORIDE 9 MG/ML
1000 INJECTION INTRAMUSCULAR; INTRAVENOUS; SUBCUTANEOUS ONCE
Refills: 0 | Status: COMPLETED | OUTPATIENT
Start: 2021-01-13 | End: 2021-01-13

## 2021-01-13 RX ORDER — FOLIC ACID 0.8 MG
1 TABLET ORAL ONCE
Refills: 0 | Status: COMPLETED | OUTPATIENT
Start: 2021-01-13 | End: 2021-01-13

## 2021-01-13 RX ORDER — THIAMINE MONONITRATE (VIT B1) 100 MG
100 TABLET ORAL ONCE
Refills: 0 | Status: COMPLETED | OUTPATIENT
Start: 2021-01-13 | End: 2021-01-13

## 2021-01-13 RX ORDER — DILTIAZEM HCL 120 MG
30 CAPSULE, EXT RELEASE 24 HR ORAL ONCE
Refills: 0 | Status: DISCONTINUED | OUTPATIENT
Start: 2021-01-13 | End: 2021-01-13

## 2021-01-13 RX ORDER — SODIUM CHLORIDE 9 MG/ML
1000 INJECTION, SOLUTION INTRAVENOUS
Refills: 0 | Status: DISCONTINUED | OUTPATIENT
Start: 2021-01-13 | End: 2021-01-13

## 2021-01-13 RX ORDER — SODIUM CHLORIDE 9 MG/ML
1000 INJECTION, SOLUTION INTRAVENOUS
Refills: 0 | Status: DISCONTINUED | OUTPATIENT
Start: 2021-01-13 | End: 2021-01-16

## 2021-01-13 RX ADMIN — Medication 100 MILLIGRAM(S): at 00:56

## 2021-01-13 RX ADMIN — SODIUM CHLORIDE 1000 MILLILITER(S): 9 INJECTION INTRAMUSCULAR; INTRAVENOUS; SUBCUTANEOUS at 05:44

## 2021-01-13 RX ADMIN — SODIUM CHLORIDE 1000 MILLILITER(S): 9 INJECTION INTRAMUSCULAR; INTRAVENOUS; SUBCUTANEOUS at 02:45

## 2021-01-13 RX ADMIN — Medication 1 MILLIGRAM(S): at 00:58

## 2021-01-13 RX ADMIN — SODIUM CHLORIDE 1000 MILLILITER(S): 9 INJECTION, SOLUTION INTRAVENOUS at 05:44

## 2021-01-13 RX ADMIN — SODIUM CHLORIDE 1000 MILLILITER(S): 9 INJECTION, SOLUTION INTRAVENOUS at 01:00

## 2021-01-13 NOTE — ED ADULT NURSE REASSESSMENT NOTE - NS ED NURSE REASSESS COMMENT FT1
Pt found trying to leave ER, states "I'm feeling agitated. I want to go home and get some sleep." MD Cardona aware, discussing POC with pt. IV removed. A&Ox4. Ambulatory with steady gait.
Assuming responsibility of care from ALEM Givens. Patient CIWA of 6 at this time, last ativan dose was one hour ago. Pending disposition. in NAD, will continue to assess.

## 2021-08-26 ENCOUNTER — EMERGENCY (EMERGENCY)
Facility: HOSPITAL | Age: 41
LOS: 1 days | Discharge: HOME | End: 2021-08-26
Attending: EMERGENCY MEDICINE | Admitting: EMERGENCY MEDICINE
Payer: COMMERCIAL

## 2021-08-26 VITALS
SYSTOLIC BLOOD PRESSURE: 129 MMHG | HEART RATE: 140 BPM | WEIGHT: 240.08 LBS | DIASTOLIC BLOOD PRESSURE: 90 MMHG | HEIGHT: 67 IN | TEMPERATURE: 99 F | OXYGEN SATURATION: 95 % | RESPIRATION RATE: 20 BRPM

## 2021-08-26 DIAGNOSIS — F10.129 ALCOHOL ABUSE WITH INTOXICATION, UNSPECIFIED: ICD-10-CM

## 2021-08-26 DIAGNOSIS — F10.139 ALCOHOL ABUSE WITH WITHDRAWAL, UNSPECIFIED: ICD-10-CM

## 2021-08-26 DIAGNOSIS — Z20.822 CONTACT WITH AND (SUSPECTED) EXPOSURE TO COVID-19: ICD-10-CM
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
## 2021-08-26 DIAGNOSIS — Z82.49 FAMILY HISTORY OF ISCHEMIC HEART DISEASE AND OTHER DISEASES OF THE CIRCULATORY SYSTEM: ICD-10-CM

## 2021-08-26 DIAGNOSIS — E86.0 DEHYDRATION: ICD-10-CM

## 2021-08-26 LAB
ALBUMIN SERPL ELPH-MCNC: 4.1 G/DL — SIGNIFICANT CHANGE UP (ref 3.4–5)
ALP SERPL-CCNC: 99 U/L — SIGNIFICANT CHANGE UP (ref 40–120)
ALT FLD-CCNC: 170 U/L — HIGH (ref 12–42)
AMPHET UR-MCNC: NEGATIVE — SIGNIFICANT CHANGE UP
ANION GAP SERPL CALC-SCNC: 22 MMOL/L — HIGH (ref 9–16)
APPEARANCE UR: CLEAR — SIGNIFICANT CHANGE UP
AST SERPL-CCNC: 177 U/L — HIGH (ref 15–37)
BACTERIA # UR AUTO: PRESENT /HPF
BARBITURATES UR SCN-MCNC: NEGATIVE — SIGNIFICANT CHANGE UP
BASOPHILS # BLD AUTO: 0.04 K/UL — SIGNIFICANT CHANGE UP (ref 0–0.2)
BASOPHILS NFR BLD AUTO: 0.4 % — SIGNIFICANT CHANGE UP (ref 0–2)
BENZODIAZ UR-MCNC: NEGATIVE — SIGNIFICANT CHANGE UP
BILIRUB SERPL-MCNC: 0.7 MG/DL — SIGNIFICANT CHANGE UP (ref 0.2–1.2)
BILIRUB UR-MCNC: NEGATIVE — SIGNIFICANT CHANGE UP
BUN SERPL-MCNC: 16 MG/DL — SIGNIFICANT CHANGE UP (ref 7–23)
CALCIUM SERPL-MCNC: 8.2 MG/DL — LOW (ref 8.5–10.5)
CHLORIDE SERPL-SCNC: 96 MMOL/L — SIGNIFICANT CHANGE UP (ref 96–108)
CO2 SERPL-SCNC: 21 MMOL/L — LOW (ref 22–31)
COCAINE METAB.OTHER UR-MCNC: NEGATIVE — SIGNIFICANT CHANGE UP
COLOR SPEC: YELLOW — SIGNIFICANT CHANGE UP
CREAT SERPL-MCNC: 0.82 MG/DL — SIGNIFICANT CHANGE UP (ref 0.5–1.3)
DIFF PNL FLD: ABNORMAL
EOSINOPHIL # BLD AUTO: 0 K/UL — SIGNIFICANT CHANGE UP (ref 0–0.5)
EOSINOPHIL NFR BLD AUTO: 0 % — SIGNIFICANT CHANGE UP (ref 0–6)
EPI CELLS # UR: SIGNIFICANT CHANGE UP /HPF (ref 0–5)
ETHANOL SERPL-MCNC: 357 MG/DL — HIGH
GLUCOSE SERPL-MCNC: 117 MG/DL — HIGH (ref 70–99)
GLUCOSE UR QL: NEGATIVE — SIGNIFICANT CHANGE UP
HCT VFR BLD CALC: 46.2 % — SIGNIFICANT CHANGE UP (ref 39–50)
HGB BLD-MCNC: 15.9 G/DL — SIGNIFICANT CHANGE UP (ref 13–17)
IMM GRANULOCYTES NFR BLD AUTO: 0.4 % — SIGNIFICANT CHANGE UP (ref 0–1.5)
KETONES UR-MCNC: 40 MG/DL
LEUKOCYTE ESTERASE UR-ACNC: NEGATIVE — SIGNIFICANT CHANGE UP
LIDOCAIN IGE QN: 116 U/L — SIGNIFICANT CHANGE UP (ref 73–393)
LYMPHOCYTES # BLD AUTO: 2.94 K/UL — SIGNIFICANT CHANGE UP (ref 1–3.3)
LYMPHOCYTES # BLD AUTO: 25.9 % — SIGNIFICANT CHANGE UP (ref 13–44)
MAGNESIUM SERPL-MCNC: 2.2 MG/DL — SIGNIFICANT CHANGE UP (ref 1.6–2.6)
MCHC RBC-ENTMCNC: 28 PG — SIGNIFICANT CHANGE UP (ref 27–34)
MCHC RBC-ENTMCNC: 34.4 GM/DL — SIGNIFICANT CHANGE UP (ref 32–36)
MCV RBC AUTO: 81.3 FL — SIGNIFICANT CHANGE UP (ref 80–100)
METHADONE UR-MCNC: NEGATIVE — SIGNIFICANT CHANGE UP
MONOCYTES # BLD AUTO: 0.8 K/UL — SIGNIFICANT CHANGE UP (ref 0–0.9)
MONOCYTES NFR BLD AUTO: 7 % — SIGNIFICANT CHANGE UP (ref 2–14)
NEUTROPHILS # BLD AUTO: 7.55 K/UL — HIGH (ref 1.8–7.4)
NEUTROPHILS NFR BLD AUTO: 66.3 % — SIGNIFICANT CHANGE UP (ref 43–77)
NITRITE UR-MCNC: NEGATIVE — SIGNIFICANT CHANGE UP
NRBC # BLD: 0 /100 WBCS — SIGNIFICANT CHANGE UP (ref 0–0)
OPIATES UR-MCNC: NEGATIVE — SIGNIFICANT CHANGE UP
PCP SPEC-MCNC: SIGNIFICANT CHANGE UP
PCP UR-MCNC: NEGATIVE — SIGNIFICANT CHANGE UP
PH UR: 5.5 — SIGNIFICANT CHANGE UP (ref 5–8)
PLATELET # BLD AUTO: 327 K/UL — SIGNIFICANT CHANGE UP (ref 150–400)
POTASSIUM SERPL-MCNC: 3.4 MMOL/L — LOW (ref 3.5–5.3)
POTASSIUM SERPL-SCNC: 3.4 MMOL/L — LOW (ref 3.5–5.3)
PROT SERPL-MCNC: 8.5 G/DL — HIGH (ref 6.4–8.2)
PROT UR-MCNC: 30 MG/DL
RBC # BLD: 5.68 M/UL — SIGNIFICANT CHANGE UP (ref 4.2–5.8)
RBC # FLD: 14.7 % — HIGH (ref 10.3–14.5)
RBC CASTS # UR COMP ASSIST: < 5 /HPF — SIGNIFICANT CHANGE UP
SARS-COV-2 RNA SPEC QL NAA+PROBE: SIGNIFICANT CHANGE UP
SODIUM SERPL-SCNC: 139 MMOL/L — SIGNIFICANT CHANGE UP (ref 132–145)
SP GR SPEC: 1.01 — SIGNIFICANT CHANGE UP (ref 1–1.03)
THC UR QL: NEGATIVE — SIGNIFICANT CHANGE UP
UROBILINOGEN FLD QL: 0.2 E.U./DL — SIGNIFICANT CHANGE UP
WBC # BLD: 11.37 K/UL — HIGH (ref 3.8–10.5)
WBC # FLD AUTO: 11.37 K/UL — HIGH (ref 3.8–10.5)
WBC UR QL: < 5 /HPF — SIGNIFICANT CHANGE UP

## 2021-08-26 PROCEDURE — 93010 ELECTROCARDIOGRAM REPORT: CPT

## 2021-08-26 PROCEDURE — 99291 CRITICAL CARE FIRST HOUR: CPT

## 2021-08-26 RX ORDER — SODIUM CHLORIDE 9 MG/ML
1000 INJECTION, SOLUTION INTRAVENOUS
Refills: 0 | Status: COMPLETED | OUTPATIENT
Start: 2021-08-26 | End: 2021-08-26

## 2021-08-26 RX ORDER — THIAMINE MONONITRATE (VIT B1) 100 MG
100 TABLET ORAL ONCE
Refills: 0 | Status: COMPLETED | OUTPATIENT
Start: 2021-08-26 | End: 2021-08-26

## 2021-08-26 RX ORDER — SODIUM CHLORIDE 9 MG/ML
2000 INJECTION, SOLUTION INTRAVENOUS
Refills: 0 | Status: COMPLETED | OUTPATIENT
Start: 2021-08-26 | End: 2021-08-26

## 2021-08-26 RX ORDER — FOLIC ACID 0.8 MG
1 TABLET ORAL ONCE
Refills: 0 | Status: COMPLETED | OUTPATIENT
Start: 2021-08-26 | End: 2021-08-26

## 2021-08-26 RX ORDER — POTASSIUM CHLORIDE 20 MEQ
40 PACKET (EA) ORAL ONCE
Refills: 0 | Status: COMPLETED | OUTPATIENT
Start: 2021-08-26 | End: 2021-08-26

## 2021-08-26 RX ORDER — SODIUM CHLORIDE 9 MG/ML
1000 INJECTION, SOLUTION INTRAVENOUS ONCE
Refills: 0 | Status: COMPLETED | OUTPATIENT
Start: 2021-08-26 | End: 2021-08-26

## 2021-08-26 RX ADMIN — SODIUM CHLORIDE 2000 MILLILITER(S): 9 INJECTION, SOLUTION INTRAVENOUS at 20:56

## 2021-08-26 RX ADMIN — SODIUM CHLORIDE 1000 MILLILITER(S): 9 INJECTION, SOLUTION INTRAVENOUS at 19:22

## 2021-08-26 RX ADMIN — Medication 40 MILLIEQUIVALENT(S): at 19:56

## 2021-08-26 RX ADMIN — Medication 1 MILLIGRAM(S): at 21:55

## 2021-08-26 RX ADMIN — Medication 100 MILLIGRAM(S): at 19:22

## 2021-08-26 RX ADMIN — Medication 2 MILLIGRAM(S): at 19:22

## 2021-08-26 RX ADMIN — SODIUM CHLORIDE 1000 MILLILITER(S): 9 INJECTION, SOLUTION INTRAVENOUS at 20:55

## 2021-08-26 RX ADMIN — Medication 2 MILLIGRAM(S): at 20:55

## 2021-08-26 RX ADMIN — SODIUM CHLORIDE 1000 MILLILITER(S): 9 INJECTION, SOLUTION INTRAVENOUS at 19:21

## 2021-08-26 RX ADMIN — Medication 100 MILLIGRAM(S): at 21:55

## 2021-08-26 RX ADMIN — Medication 1 TABLET(S): at 21:57

## 2021-08-26 NOTE — ED ADULT NURSE NOTE - NSIMPLEMENTINTERV_GEN_ALL_ED
Implemented All Universal Safety Interventions:  [place] to call system. Call bell, personal items and telephone within reach. Instruct patient to call for assistance. Room bathroom lighting operational. Non-slip footwear when patient is off stretcher. Physically safe environment: no spills, clutter or unnecessary equipment. Stretcher in lowest position, wheels locked, appropriate side rails in place.

## 2021-08-26 NOTE — ED ADULT TRIAGE NOTE - CHIEF COMPLAINT QUOTE
pt walk in, c/o alcohol withdrawal. states his last drink was 24 hours ago. c/o shakiness. "no other symptoms right now"/. denies ever having a seizure. denies any med hx.

## 2021-08-26 NOTE — ED PROVIDER NOTE - CRITICAL CARE ATTENDING CONTRIBUTION TO CARE
The patient was seen immediately upon arrival due to a high probability of imminent or life-threatening deterioration secondary to EtOH WD, which required my direct attention, intervention, and personal management at the bedside. I have personally provided critical care time exclusive of time spent on separately billable procedures. Time includes review of laboratory data, radiology results, discussion with consultants, discussion with the patient's family, and monitoring for potential decompensation.

## 2021-08-26 NOTE — ED PROVIDER NOTE - OBJECTIVE STATEMENT
40 M presenting with "[id number] and EtOH WD. He was sober from Jan 2021 until this week. He relapsed about 6 days ago. He dad reports that he drank 3 large bottles of Vodka over about 4-5 days and that he hasn't had a drink in 36h (pt says last drink was 24h ago). He also hasn't eaten in 6 days. He dad also says that he lives in the same townhouse as them That he had been doing very well until this relapse.     + feels dehydrated, no nausea, no CCP or SOB. He works at [employer]. + vaccinated for COVID.     He has been seen here for EtOH related issues before. 40 M presenting with "[id number] and EtOH WD. He was sober from Jan 2021 until this week. He relapsed about 6 days ago. He dad reports that he drank 3 large bottles of Vodka over about 4-5 days and that he hasn't had a drink in 36h (pt says last drink was this am, after drinking all night). He also hasn't eaten in 6 days. He dad also says that he lives in the same townhouse as them That he had been doing very well until this relapse.     + feels dehydrated, no nausea, no CCP or SOB. He works at [employer]. + vaccinated for COVID.     He has been seen here for EtOH related issues before.

## 2021-08-26 NOTE — ED CDU PROVIDER INITIAL DAY NOTE - OBJECTIVE STATEMENT
40 M presenting with "[id number] and EtOH WD. He was sober from Jan 2021 until this week. He relapsed about 6 days ago. He dad reports that he drank 3 large bottles of Vodka over about 4-5 days and that he hasn't had a drink in 36h (pt says last drink was 24h ago). He also hasn't eaten in 6 days. He dad also says that he lives in the same townhouse as them That he had been doing very well until this relapse.     + feels dehydrated, no nausea, no CCP or SOB. He works at [employer]. + vaccinated for COVID.     He has been seen here for EtOH related issues before.

## 2021-08-26 NOTE — ED ADULT TRIAGE NOTE - PATIENT ON (OXYGEN DELIVERY METHOD)
room air no numbness/no confusion/no loss of consciousness/no tingling/no vomiting/no bleeding/no deformity/no fever

## 2021-08-26 NOTE — ED PROVIDER NOTE - ACUTE OR EVOLVING MI?
Patient is a 79y old  Female who presents with a chief complaint of covid19, generalized weakness , anson (08 Nov 2020 13:33)      INTERVAL HPI/OVERNIGHT EVENTS:  Patient seen awake and sitting in room. Interviewed with PPE in isolation. Denies chest pain or SOB at this time. No complaints. No overnight events.      MEDICATIONS  (STANDING):  cholecalciferol 2000 Unit(s) Oral daily  heparin   Injectable 5000 Unit(s) SubCutaneous every 8 hours  influenza   Vaccine 0.5 milliLiter(s) IntraMuscular once  levothyroxine 25 MICROGram(s) Oral daily  losartan 100 milliGRAM(s) Oral daily  multivitamin/minerals 1 Tablet(s) Oral daily  simvastatin 20 milliGRAM(s) Oral at bedtime  sodium chloride 0.9% lock flush 3 milliLiter(s) IV Push every 8 hours  triamterene 37.5 mG/hydrochlorothiazide 25 mG Tablet 1 Tablet(s) Oral daily  zinc sulfate 220 milliGRAM(s) Oral daily    MEDICATIONS  (PRN):  acetaminophen   Tablet .. 650 milliGRAM(s) Oral every 6 hours PRN Temp greater or equal to 38C (100.4F), Mild Pain (1 - 3)  acetaminophen   Tablet .. 975 milliGRAM(s) Oral every 8 hours PRN Moderate Pain (4 - 6)  hydrocodone/homatropine Syrup 5 milliLiter(s) Oral every 4 hours PRN Cough      Allergies    all narcotics give severe vomitting and dizziness (Other; Vomiting)  ampicillin (Stomach Upset)  Bactrim (Rash)  Cipro (Other)  Levaquin (Unknown)    Intolerances    Augmentin (Diarrhea)      REVIEW OF SYSTEMS:  CONSTITUTIONAL: No fever, weight loss, or fatigue  EYES: No eye pain, visual disturbances, or discharge  ENMT:  No difficulty hearing, tinnitus, vertigo; No sinus or throat pain  NECK: No pain or stiffness  RESPIRATORY: No cough, wheezing, chills or hemoptysis; No shortness of breath  CARDIOVASCULAR: No chest pain, palpitations, or lightheadedness  GASTROINTESTINAL: No abdominal or epigastric pain. No nausea, vomiting, or hematemesis; No diarrhea or constipation. No melena or hematochezia.  GENITOURINARY: No dysuria, frequency, hematuria, or incontinence  NEUROLOGICAL: No headaches, vertigo, memory loss, loss of strength, numbness, or tremors  SKIN: No itching, burning, rashes, or lesions   LYMPH NODES: No enlarged glands  ENDOCRINE: No heat or cold intolerance; No hair loss; No polydipsia or polyuria  MUSCULOSKELETAL: No back pain      PHYSICAL EXAM:  GENERAL: NAD, well-groomed, well-developed  HEAD:  Atraumatic, Normocephalic  EYES: EOMI, PERRLA, conjunctiva and sclera clear  ENMT: Moist mucous membranes, Good dentition   NECK: Supple, No JVD appreciated  NERVOUS SYSTEM:  Alert & Oriented X3, Good concentration; Bilateral LE mobile, sensation to light touch intact  CHEST/LUNG: Clear to auscultation bilaterally; No rales, rhonchi, wheezing, or rubs  HEART: Regular rate and rhythm; No murmurs, rubs, or gallops  ABDOMEN: Soft, Nontender, Nondistended; Bowel sounds present  EXTREMITIES:  2+ Peripheral Pulses, No clubbing or cyanosis  LYMPH: No lymphadenopathy noted  SKIN: No rashes or lesions appreciated    Vital Signs Last 24 Hrs  T(C): 36.8 (09 Nov 2020 19:54), Max: 36.8 (09 Nov 2020 19:54)  T(F): 98.2 (09 Nov 2020 19:54), Max: 98.2 (09 Nov 2020 19:54)  HR: 83 (09 Nov 2020 19:54) (61 - 83)  BP: 136/82 (09 Nov 2020 19:54) (103/67 - 136/82)  BP(mean): --  RR: 18 (09 Nov 2020 19:54) (18 - 18)  SpO2: 97% (09 Nov 2020 19:54) (96% - 99%)    LABS:      Ca    8.9        08 Nov 2020 11:59          CAPILLARY BLOOD GLUCOSE               [x] Consultant(s) Notes Reviewed no

## 2021-08-26 NOTE — ED PROVIDER NOTE - PHYSICAL EXAMINATION
VITAL SIGNS: I have reviewed nursing notes and confirm.  CONSTITUTIONAL: Well-developed; well-nourished; + tremulous (mild)  SKIN: Skin is warm and dry, no acute rash.  HEAD: Normocephalic; atraumatic.  EYES: Pupils round bilaterally, 3mm; conjunctiva and sclera clear.  ENT: No nasal discharge; airway clear, MM dry, white coated tongue.  NECK: Supple; non tender.  CARD: S1, S2 normal; no murmurs, gallops, or rubs. Tachycardic rate and normal rhythm.  RESP: No wheezes, rales or rhonchi.  : No CVAT tenderness bilaterally   ABD: Soft; non-distended; non-tender; no rebound or guarding.  EXT: Normal ROM. No clubbing, cyanosis or edema.  NEURO: Alert, oriented. Grossly unremarkable. PERAZA, normal tone, no gross motor or sensory changes. Fluent speech.  + mild tremor.  PSYCH: Cooperative, appropriate. Mood and affect wnl.

## 2021-08-26 NOTE — ED CDU PROVIDER INITIAL DAY NOTE - PROGRESS NOTE DETAILS
improved VS, CIWA of 2 currently, sleeping comfortably in stretcher, reports feeling back to baseline and feels comfortable going home on librium taper and outpt detox information provided, counseling provided at bedside received s/o from Dr. Bartholomew.  Briefly pt relapsed on alcohol use and noted to have decreased po intake as well with physical exam and labs suggestive of dehydration as well, currently receiving IVF and IV benzo for alcohol withdrawal, CIWA protocol in place, pt reports feeling better and able to tolerate po, will continue with close monitoring, CIWA protocol and reassess pt reports feeling markedly improved s/p serial doses of IV ativan and librium, sleeping comfortably and repeat CIWA 6, still tachycardiac to 130s, no CP, CIWA downtrending, likely partially due to volume depletion, will continue with IV hydration and serial reassessment

## 2021-08-27 VITALS
RESPIRATION RATE: 18 BRPM | HEART RATE: 115 BPM | OXYGEN SATURATION: 99 % | DIASTOLIC BLOOD PRESSURE: 71 MMHG | SYSTOLIC BLOOD PRESSURE: 135 MMHG

## 2021-08-27 RX ORDER — ONDANSETRON 8 MG/1
1 TABLET, FILM COATED ORAL
Qty: 10 | Refills: 0
Start: 2021-08-27

## 2021-08-27 RX ORDER — PANTOPRAZOLE SODIUM 20 MG/1
1 TABLET, DELAYED RELEASE ORAL
Qty: 15 | Refills: 0
Start: 2021-08-27 | End: 2021-09-10

## 2021-08-27 RX ORDER — PANTOPRAZOLE SODIUM 20 MG/1
40 TABLET, DELAYED RELEASE ORAL ONCE
Refills: 0 | Status: COMPLETED | OUTPATIENT
Start: 2021-08-27 | End: 2021-08-27

## 2021-08-27 RX ORDER — SUCRALFATE 1 G
1 TABLET ORAL ONCE
Refills: 0 | Status: COMPLETED | OUTPATIENT
Start: 2021-08-27 | End: 2021-08-27

## 2021-08-27 RX ADMIN — SODIUM CHLORIDE 1000 MILLILITER(S): 9 INJECTION, SOLUTION INTRAVENOUS at 02:32

## 2021-08-27 RX ADMIN — PANTOPRAZOLE SODIUM 40 MILLIGRAM(S): 20 TABLET, DELAYED RELEASE ORAL at 01:09

## 2021-08-27 RX ADMIN — Medication 2 MILLIGRAM(S): at 04:05

## 2021-08-27 RX ADMIN — Medication 1 GRAM(S): at 01:09

## 2021-08-27 RX ADMIN — SODIUM CHLORIDE 1000 MILLILITER(S): 9 INJECTION, SOLUTION INTRAVENOUS at 02:33

## 2021-08-27 NOTE — ED CDU PROVIDER DISPOSITION NOTE - CLINICAL COURSE
40 M presenting with "hangover and EtOH WD. He was sober from Jan 2021 until this week. He relapsed about 6 days ago. He dad reports that he drank 3 large bottles of Vodka over about 4-5 days and that he hasn't had a drink in 36h (pt says last drink was 24h ago). He also hasn't eaten in 6 days. Noted to have moderate alcohol withdrawal sx and dehydration clinically.  placed in obs for tele monitoring and serial reassessments requiring multiple doses of benzo and IVF for sx control.  CIWA protocol in place, serial CIWA scores downtrending, pt tolerating po, counseling provided, medically stable for outpt f/u and detox information provided, strict return precautions discussed, pt verbalized understanding

## 2021-08-27 NOTE — ED CDU PROVIDER DISPOSITION NOTE - PATIENT PORTAL LINK FT
You can access the FollowMyHealth Patient Portal offered by [facility] by registering at the following website: [email or web address]. By joining [employer]’s FollowMyHealth portal, you will also be able to view your health information using other applications (apps) compatible with our system.

## 2021-08-27 NOTE — ED CDU PROVIDER DISPOSITION NOTE - NSFOLLOWUPINSTRUCTIONS_ED_ALL_ED_FT
Alcohol Withdrawal    AMBULATORY CARE:    Alcohol withdrawal is a group of symptoms that occur when you drink alcohol daily and suddenly stop. Withdrawal may also happen if you suddenly reduce the amount of alcohol that you normally drink.    Signs and symptoms of alcohol withdrawal often start 4 to 24 hours after you stop drinking. Signs and symptoms may be mild at first and get worse over 2 to 3 days during the detoxification process. Detoxification means your body is working to remove the alcohol. You may have any of the following:   •Headaches      •Fever, sweating, shakiness, and a fast heartbeat      •Confusion, trouble remembering, or hallucinations      •Nervousness, anxiety, and agitation      •Nausea and vomiting      •Poor sleep, restlessness, or nightmares      •Seizures 24 hours to 1 week after your last drink      Delirium tremens (DTs) are severe symptoms of alcohol withdrawal that can start 3 to 4 days after you stop drinking. DTs may include any of the following:   •Fever      •Feeling restless or agitated      •Tremors      •Constant hallucinations      •Faster heartbeat and breathing than usual      Call your local emergency number (911 in the ) for any of the following:   •You have sudden chest pain or trouble breathing.      •You pass out or think you had a seizure.      •You feel like you want to harm yourself or others.      Call your doctor if:   •Your breathing or heartbeat is faster than usual.      •You are confused, hallucinating, or extremely agitated.      •You cannot stop vomiting, or you vomit blood.      •You are shaking and it does not get better after you take your medicine.      •You have questions or concerns about your condition or care.      Treatment: Your healthcare provider will ask how much and how often you drink. He or she will also ask how long it has been since you had your last drink. Blood or urine tests may be used to check the amount of alcohol in your blood. The tests may also show organ damage or low vitamin or electrolyte levels. The goal of treatment is to manage your symptoms and help prevent severe symptoms from developing. You may need to be treated in the hospital if you have severe withdrawal symptoms.  •Medicines may be given to calm you and help manage your symptoms.      •Vitamin supplementssuch as thiamine (vitamin B1) may be recommended. High alcohol intake can keep your body from absorbing enough vitamins from food.      Have someone stay with you during withdrawal: This person should help you take your medicine and keep you in a calm, quiet environment. He or she should also watch your symptoms and know what to do if your symptoms get worse.    Learn to stop drinking alcohol safely: Work with your healthcare provider to develop a plan for you to stop drinking safely. A sudden stop or change can be life-threatening.    For support and more information:   •Alcoholics Anonymous  Web Address: http://www.aa.org      •Substance Abuse and Mental Health Services Administration  [address]  [place] [address]  Web Address: http://www.[facility].gov        Follow up with your healthcare provider as directed: Write down your questions so you remember to ask them during your visits.

## 2022-01-17 NOTE — PATIENT PROFILE ADULT - NSPROMEDSADMININFO_GEN_A_NUR
Message left with information below,  
Please call patient.  She has not responded to previous letters.  Advise her she is overdue for lab work and for follow-up with me.  
no concerns

## 2022-02-22 NOTE — ED CDU PROVIDER INITIAL DAY NOTE - MUSCULOSKELETAL NEGATIVE STATEMENT, MLM
Patient canceled his 02.22 appointment, symptom resolved.  
no back pain, no gout, no musculoskeletal pain, no neck pain, and no weakness.

## 2022-04-06 NOTE — ED PROVIDER NOTE - IV ALTEPLASE ADMIN OUTSIDE HIDDEN
show [employer] Counseling:  I discussed with the patient the risks of mycophenolate mofetil including but not limited to infection/immunosuppression, GI upset, hypokalemia, hypercholesterolemia, bone marrow suppression, lymphoproliferative disorders, malignancy, GI ulceration/bleed/perforation, colitis, interstitial lung disease, kidney failure, progressive multifocal leukoencephalopathy, and birth defects.  The patient understands that monitoring is required including a baseline creatinine and regular CBC testing. In addition, patient must alert us immediately if symptoms of infection or other concerning signs are noted.

## 2022-05-05 NOTE — ED ADULT NURSE NOTE - GENITOURINARY ASSESSMENT
4/7/22: Patient was referred by Dr. Robert David for colon cancer screen. A copy of this document will be sent to the physician. Patient is a 62 yo male with PMH of CVA (2002), HLD, prostate cancer who presents for surveillance colonoscopy. Last cscope was reportedly with GAG 3 years ago with several polyps. Told to repeat in 3 years. Pt does admit to constipation with BMs every 3 days with straining. Pt does not take anything for constipation. No diarrhea, melena, rectal bleeding, abdominal pain, or unintentional weight loss. Patient denies cardiopulmonary disease and does not have a pacemaker or defibrillator. Denies use of anticoagulants or antiplatelets. No family history of colon cancer, no history of anesthesia problems.   5/5/22 Follow up: Pt presents for colonoscopy follow up. Doing well overall. Constipation has improved since cscope. Pt has not tried Metamucil or Miralax. No complaints.  Colonoscopy 4/21/22: - Diverticulosis in the sigmoid colon and in the descending colon. - One 7 mm polyp in the cecum, removed piecemeal using a cold snare. Resected and retrieved. - One 4 mm polyp at the splenic flexure, removed with a cold snare. Resected and retrieved. - Two 2 to 3 mm polyps in the rectum, removed with a cold snare. Resected and retrieved Biopsy: Cecum - 1 SSP. Splenic flexure - 1 TA. Rectum - 1 TA and 1 HP.
WDL

## 2022-06-24 NOTE — ED ADULT NURSE NOTE - NS ED NURSE IV DC DT
Pt called to verify his upcoming appointment. Pt stated that he is at [facility]. Pt stated they are requesting us to fax pts upcoming ov with nprb on 07/14 so that they are able to provide transportation. Fax is [phone]. 06-Jul-2020 07:08

## 2022-09-22 NOTE — ED PROVIDER NOTE - DURATION
Impression: Vitreous degeneration, left eye Plan: Retina exam appears stable. Signs and symptoms of RD reviewed. RTC STAT if any increased in floaters or loss of VA. See today's mac oct- stable. today

## 2022-10-03 NOTE — ED ADULT TRIAGE NOTE - CADM TRG TX PRIOR TO ARRIVAL
none [Negative] : Endocrine [Abdominal Pain] : no abdominal pain [Vomiting] : no vomiting [Diarrhea] : no diarrhea [Dysuria] : no dysuria [Incontinence] : no incontinence [Skin Lesions] : no skin lesions [Easy Bleeding] : no tendency for easy bleeding [Easy Bruising] : no tendency for easy bruising

## 2022-10-16 NOTE — ED CDU PROVIDER INITIAL DAY NOTE - RATE
Problem: Falls - Risk of  Goal: *Absence of Falls  Description: Document Kristy Duverney Fall Risk and appropriate interventions in the flowsheet. Outcome: Progressing Towards Goal  Note: Fall Risk Interventions:  Mobility Interventions: Bed/chair exit alarm    Mentation Interventions: Adequate sleep, hydration, pain control    Medication Interventions: Bed/chair exit alarm    Elimination Interventions: Call light in reach    History of Falls Interventions: Bed/chair exit alarm         Problem: Patient Education: Go to Patient Education Activity  Goal: Patient/Family Education  Outcome: Progressing Towards Goal     Problem: Fluid Volume - Risk of, Imbalanced  Goal: *Balanced intake and output  Outcome: Progressing Towards Goal     Problem: Patient Education: Go to Patient Education Activity  Goal: Patient/Family Education  Outcome: Progressing Towards Goal     Problem: Pressure Injury - Risk of  Goal: *Prevention of pressure injury  Description: Document Boris Scale and appropriate interventions in the flowsheet.   Outcome: Progressing Towards Goal  Note: Pressure Injury Interventions:       Moisture Interventions: Absorbent underpads    Activity Interventions: Pressure redistribution bed/mattress(bed type)    Mobility Interventions: HOB 30 degrees or less    Nutrition Interventions: Document food/fluid/supplement intake    Friction and Shear Interventions: Minimize layers                Problem: Patient Education: Go to Patient Education Activity  Goal: Patient/Family Education  Outcome: Progressing Towards Goal     Problem: Patient Education: Go to Patient Education Activity  Goal: Patient/Family Education  Outcome: Progressing Towards Goal     Problem: Patient Education: Go to Patient Education Activity  Goal: Patient/Family Education  Outcome: Progressing Towards Goal     Problem: Anemia Care Plan (Adult and Pediatric)  Goal: *Labs within defined limits  Outcome: Progressing Towards Goal  Goal: *Tolerates increased activity  Outcome: Progressing Towards Goal     Problem: Patient Education: Go to Patient Education Activity  Goal: Patient/Family Education  Outcome: Progressing Towards Goal 132

## 2022-12-28 ENCOUNTER — EMERGENCY (EMERGENCY)
Facility: HOSPITAL | Age: 42
LOS: 1 days | Discharge: LEFT BEFORE DISCHARGE | End: 2022-12-28
Admitting: EMERGENCY MEDICINE
Payer: COMMERCIAL

## 2022-12-28 VITALS
HEIGHT: 67 IN | OXYGEN SATURATION: 97 % | TEMPERATURE: 98 F | WEIGHT: 240.08 LBS | HEART RATE: 150 BPM | RESPIRATION RATE: 16 BRPM | SYSTOLIC BLOOD PRESSURE: 122 MMHG | DIASTOLIC BLOOD PRESSURE: 92 MMHG

## 2022-12-28 VITALS — HEART RATE: 128 BPM

## 2022-12-28 DIAGNOSIS — Z53.29 PROCEDURE AND TREATMENT NOT CARRIED OUT BECAUSE OF PATIENT'S DECISION FOR OTHER REASONS: ICD-10-CM

## 2022-12-28 DIAGNOSIS — Z20.822 CONTACT WITH AND (SUSPECTED) EXPOSURE TO COVID-19: ICD-10-CM

## 2022-12-28 DIAGNOSIS — F10.939 ALCOHOL USE, UNSPECIFIED WITH WITHDRAWAL, UNSPECIFIED: ICD-10-CM

## 2022-12-28 DIAGNOSIS — R00.0 TACHYCARDIA, UNSPECIFIED: ICD-10-CM

## 2022-12-28 DIAGNOSIS — E87.6 HYPOKALEMIA: ICD-10-CM

## 2022-12-28 LAB
ALBUMIN SERPL ELPH-MCNC: 3.9 G/DL — SIGNIFICANT CHANGE UP (ref 3.4–5)
ALP SERPL-CCNC: 119 U/L — SIGNIFICANT CHANGE UP (ref 40–120)
ALT FLD-CCNC: 87 U/L — HIGH (ref 12–42)
ANION GAP SERPL CALC-SCNC: 15 MMOL/L — SIGNIFICANT CHANGE UP (ref 9–16)
AST SERPL-CCNC: 87 U/L — HIGH (ref 15–37)
BASOPHILS # BLD AUTO: 0.03 K/UL — SIGNIFICANT CHANGE UP (ref 0–0.2)
BASOPHILS NFR BLD AUTO: 0.3 % — SIGNIFICANT CHANGE UP (ref 0–2)
BILIRUB SERPL-MCNC: 0.8 MG/DL — SIGNIFICANT CHANGE UP (ref 0.2–1.2)
BUN SERPL-MCNC: 14 MG/DL — SIGNIFICANT CHANGE UP (ref 7–23)
CALCIUM SERPL-MCNC: 8.5 MG/DL — SIGNIFICANT CHANGE UP (ref 8.5–10.5)
CHLORIDE SERPL-SCNC: 95 MMOL/L — LOW (ref 96–108)
CO2 SERPL-SCNC: 29 MMOL/L — SIGNIFICANT CHANGE UP (ref 22–31)
CREAT SERPL-MCNC: 1 MG/DL — SIGNIFICANT CHANGE UP (ref 0.5–1.3)
EGFR: 97 ML/MIN/1.73M2 — SIGNIFICANT CHANGE UP
EOSINOPHIL # BLD AUTO: 0 K/UL — SIGNIFICANT CHANGE UP (ref 0–0.5)
EOSINOPHIL NFR BLD AUTO: 0 % — SIGNIFICANT CHANGE UP (ref 0–6)
ETHANOL SERPL-MCNC: 364 MG/DL — HIGH
FLUAV AG NPH QL: SIGNIFICANT CHANGE UP
FLUBV AG NPH QL: SIGNIFICANT CHANGE UP
GLUCOSE SERPL-MCNC: 129 MG/DL — HIGH (ref 70–99)
HCT VFR BLD CALC: 51.2 % — HIGH (ref 39–50)
HGB BLD-MCNC: 17.4 G/DL — HIGH (ref 13–17)
IMM GRANULOCYTES NFR BLD AUTO: 0.1 % — SIGNIFICANT CHANGE UP (ref 0–0.9)
LIDOCAIN IGE QN: 80 U/L — SIGNIFICANT CHANGE UP (ref 73–393)
LYMPHOCYTES # BLD AUTO: 4.61 K/UL — HIGH (ref 1–3.3)
LYMPHOCYTES # BLD AUTO: 46.9 % — HIGH (ref 13–44)
MAGNESIUM SERPL-MCNC: 2.2 MG/DL — SIGNIFICANT CHANGE UP (ref 1.6–2.6)
MCHC RBC-ENTMCNC: 27.3 PG — SIGNIFICANT CHANGE UP (ref 27–34)
MCHC RBC-ENTMCNC: 34 GM/DL — SIGNIFICANT CHANGE UP (ref 32–36)
MCV RBC AUTO: 80.4 FL — SIGNIFICANT CHANGE UP (ref 80–100)
MONOCYTES # BLD AUTO: 0.65 K/UL — SIGNIFICANT CHANGE UP (ref 0–0.9)
MONOCYTES NFR BLD AUTO: 6.6 % — SIGNIFICANT CHANGE UP (ref 2–14)
NEUTROPHILS # BLD AUTO: 4.52 K/UL — SIGNIFICANT CHANGE UP (ref 1.8–7.4)
NEUTROPHILS NFR BLD AUTO: 46.1 % — SIGNIFICANT CHANGE UP (ref 43–77)
NRBC # BLD: 0 /100 WBCS — SIGNIFICANT CHANGE UP (ref 0–0)
PCP SPEC-MCNC: SIGNIFICANT CHANGE UP
PLATELET # BLD AUTO: 336 K/UL — SIGNIFICANT CHANGE UP (ref 150–400)
POTASSIUM SERPL-MCNC: 3.2 MMOL/L — LOW (ref 3.5–5.3)
POTASSIUM SERPL-SCNC: 3.2 MMOL/L — LOW (ref 3.5–5.3)
PROT SERPL-MCNC: 9 G/DL — HIGH (ref 6.4–8.2)
RBC # BLD: 6.37 M/UL — HIGH (ref 4.2–5.8)
RBC # FLD: 15.9 % — HIGH (ref 10.3–14.5)
RSV RNA NPH QL NAA+NON-PROBE: SIGNIFICANT CHANGE UP
SARS-COV-2 RNA SPEC QL NAA+PROBE: SIGNIFICANT CHANGE UP
SODIUM SERPL-SCNC: 139 MMOL/L — SIGNIFICANT CHANGE UP (ref 132–145)
WBC # BLD: 9.82 K/UL — SIGNIFICANT CHANGE UP (ref 3.8–10.5)
WBC # FLD AUTO: 9.82 K/UL — SIGNIFICANT CHANGE UP (ref 3.8–10.5)

## 2022-12-28 PROCEDURE — 99285 EMERGENCY DEPT VISIT HI MDM: CPT

## 2022-12-28 PROCEDURE — 93010 ELECTROCARDIOGRAM REPORT: CPT | Mod: NC

## 2022-12-28 RX ORDER — FAMOTIDINE 10 MG/ML
20 INJECTION INTRAVENOUS ONCE
Refills: 0 | Status: COMPLETED | OUTPATIENT
Start: 2022-12-28 | End: 2022-12-28

## 2022-12-28 RX ORDER — SODIUM CHLORIDE 9 MG/ML
2000 INJECTION, SOLUTION INTRAVENOUS
Refills: 0 | Status: DISCONTINUED | OUTPATIENT
Start: 2022-12-28 | End: 2023-01-01

## 2022-12-28 RX ORDER — POTASSIUM CHLORIDE 20 MEQ
40 PACKET (EA) ORAL ONCE
Refills: 0 | Status: COMPLETED | OUTPATIENT
Start: 2022-12-28 | End: 2022-12-28

## 2022-12-28 RX ORDER — ONDANSETRON 8 MG/1
4 TABLET, FILM COATED ORAL ONCE
Refills: 0 | Status: COMPLETED | OUTPATIENT
Start: 2022-12-28 | End: 2022-12-28

## 2022-12-28 RX ADMIN — SODIUM CHLORIDE 2000 MILLILITER(S): 9 INJECTION, SOLUTION INTRAVENOUS at 21:22

## 2022-12-28 RX ADMIN — ONDANSETRON 4 MILLIGRAM(S): 8 TABLET, FILM COATED ORAL at 21:22

## 2022-12-28 RX ADMIN — SODIUM CHLORIDE 2000 MILLILITER(S): 9 INJECTION, SOLUTION INTRAVENOUS at 22:28

## 2022-12-28 RX ADMIN — FAMOTIDINE 20 MILLIGRAM(S): 10 INJECTION INTRAVENOUS at 21:22

## 2022-12-28 RX ADMIN — Medication 40 MILLIEQUIVALENT(S): at 22:02

## 2022-12-28 RX ADMIN — Medication 50 MILLIGRAM(S): at 21:59

## 2022-12-28 NOTE — ED PROVIDER NOTE - CARE PLAN
Problem: Patient Care Overview  Goal: Plan of Care/Patient Progress Review  Outcome: Improving  Pt A/O x4. SBA. VSS, hx A fib with pacemaker. Pain 3/10 in left ankle, given 1 tab of Norco at 0040. Frequently stands up to use urinal. No stool. BLE edema. Left ankle wound, stage 2. Plan to treat wound with antibiotics for 2 days. Possible MRI tomorrow. WOC consult to be completed. Discharge pending.        1 Principal Discharge DX:	Alcohol withdrawal   Principal Discharge DX:	Alcohol withdrawal  Secondary Diagnosis:	Hypokalemia

## 2022-12-28 NOTE — ED PROVIDER NOTE - OBJECTIVE STATEMENT
43 yo M with PMHx of Alcohol withdrawal in the past, no history of DT/seizures, last episode 1 yr ago, goes to AA, presenting today complaining of alcohol withdrawal symptoms x24 hours.  Patient reports binge drinking for 7 days, usually 12 pack of beers daily and last drink approximately at 2 PM yesterday. Noted having some shakes today, tremor, nausea and 1 episode NBNB emesis yesterday and mild headache this morning as well. Denies seizure activities, LOC, SOB, CP, palpitations, N/V/D/C, change in ROM/sensation, abdominal/back/neck pain, urinary/bowel incontinence, focal weakness, AH/VH/delusion, change in vision/gait/speech, dysequilibrium, paresthesia, numbness, tingling, diplopia, peripheral edema, and malaise

## 2022-12-28 NOTE — ED PROVIDER NOTE - PHYSICAL EXAMINATION
Vital Signs - nursing notes reviewed and confirmed  Gen - WDWN M, NAD, comfortable and non-toxic appearing, speaking in full sentences   Skin - warm, dry, intact  HEENT - AT/NC, PERRL, sclera clear, moist oral mucosa, TM intact b/l with good cone of lights, o/p clear with no erythema, edema, or exudate, uvula midline, airway patent, neck supple and NT, FROM, no JVD or carotid bruits b/l, no palpable nodes  CV - S1S2, R/R/R  Resp - respiration non-labored, CTAB, symmetric bs b/l, no r/r/w  GI - NABS, soft, ND, NT, no rebound or guarding, no CVAT b/l   MS - w/w/p, no c/c/e, calves supple and NT, distal pulses symmetric b/l, brisk cap refills, +SILT, NV intact, FROM, compartment soft  Neuro - AxOx3, no focal neuro deficits, CN II-XII grossly intact, fluent speech, cerebellar function intact, negative pronator drift, negative nystagmus, ambulatory without gait disturbance, +fine tremors, minimal tongue fasciculation   Psych - Cooperative, appropriate mood, language/behaviors Vital Signs - nursing notes reviewed and confirmed  Gen - WDWN M, NAD, comfortable and non-toxic appearing, speaking in full sentences   Skin - warm, dry, intact  HEENT - AT/NC, PERRL, sclera clear, moist oral mucosa, TM intact b/l with good cone of lights, o/p clear with no erythema, edema, or exudate, uvula midline, airway patent, neck supple and NT, FROM, no JVD or carotid bruits b/l, no palpable nodes  CV - S1S2, Rapid rate, regular rhythm   Resp - respiration non-labored, CTAB, symmetric bs b/l, no r/r/w  GI - NABS, soft, ND, NT, no rebound or guarding, no CVAT b/l   MS - w/w/p, no c/c/e, calves supple and NT, distal pulses symmetric b/l, brisk cap refills, +SILT, NV intact, FROM, compartment soft  Neuro - AxOx3, no focal neuro deficits, CN II-XII grossly intact, fluent speech, cerebellar function intact, negative pronator drift, negative nystagmus, ambulatory without gait disturbance, +fine tremors, no tongue fasciculation   Psych - Cooperative, appropriate mood, language/behaviors

## 2022-12-28 NOTE — ED ADULT NURSE NOTE - OBJECTIVE STATEMENT
Pt is in no acute distress. Pt c/o alcohol withdrawal. Pt seen and examined by ER provider. Labs collected and sent. Will continue to provide care.

## 2022-12-28 NOTE — ED ADULT NURSE REASSESSMENT NOTE - NS ED NURSE REASSESS COMMENT FT1
Pt left ED without being discharge. Pt pulled out his own IV access.  ER provider Ana was informed. Charge nurse made aware.  Pt was in no acute distress. He ambulated with steady gait. no nausea, no vomiting  was observed. Pt left ED without being discharge. Pt pulled out his own IV access, attached with saline bag and left on the stretcher. ER provider Ming was informed. Charge nurse made aware.  Pt was in no acute distress. He ambulated with steady gait. no nausea, no vomiting  was observed.

## 2023-01-01 VITALS
RESPIRATION RATE: 19 BRPM | HEART RATE: 145 BPM | OXYGEN SATURATION: 99 % | HEIGHT: 67 IN | DIASTOLIC BLOOD PRESSURE: 72 MMHG | TEMPERATURE: 99 F | SYSTOLIC BLOOD PRESSURE: 114 MMHG | WEIGHT: 240.08 LBS

## 2023-01-01 LAB
ALBUMIN SERPL ELPH-MCNC: 3.1 G/DL — LOW (ref 3.4–5)
ALBUMIN SERPL ELPH-MCNC: 3.5 G/DL — SIGNIFICANT CHANGE UP (ref 3.4–5)
ALP SERPL-CCNC: 107 U/L — SIGNIFICANT CHANGE UP (ref 40–120)
ALP SERPL-CCNC: 95 U/L — SIGNIFICANT CHANGE UP (ref 40–120)
ALT FLD-CCNC: 84 U/L — HIGH (ref 12–42)
ALT FLD-CCNC: 96 U/L — HIGH (ref 12–42)
AMPHET UR-MCNC: NEGATIVE — SIGNIFICANT CHANGE UP
ANION GAP SERPL CALC-SCNC: 10 MMOL/L — SIGNIFICANT CHANGE UP (ref 9–16)
ANION GAP SERPL CALC-SCNC: 12 MMOL/L — SIGNIFICANT CHANGE UP (ref 9–16)
ANION GAP SERPL CALC-SCNC: 18 MMOL/L — HIGH (ref 9–16)
APPEARANCE UR: CLEAR — SIGNIFICANT CHANGE UP
AST SERPL-CCNC: 140 U/L — HIGH (ref 15–37)
AST SERPL-CCNC: 148 U/L — HIGH (ref 15–37)
BACTERIA # UR AUTO: PRESENT /HPF
BARBITURATES UR SCN-MCNC: NEGATIVE — SIGNIFICANT CHANGE UP
BASOPHILS # BLD AUTO: 0.04 K/UL — SIGNIFICANT CHANGE UP (ref 0–0.2)
BASOPHILS NFR BLD AUTO: 0.3 % — SIGNIFICANT CHANGE UP (ref 0–2)
BENZODIAZ UR-MCNC: POSITIVE
BILIRUB DIRECT SERPL-MCNC: 0.4 MG/DL — HIGH (ref 0–0.3)
BILIRUB INDIRECT FLD-MCNC: 0.7 MG/DL — SIGNIFICANT CHANGE UP (ref 0.2–1)
BILIRUB SERPL-MCNC: 1.1 MG/DL — SIGNIFICANT CHANGE UP (ref 0.2–1.2)
BILIRUB SERPL-MCNC: 1.6 MG/DL — HIGH (ref 0.2–1.2)
BILIRUB UR-MCNC: NEGATIVE — SIGNIFICANT CHANGE UP
BUN SERPL-MCNC: 15 MG/DL — SIGNIFICANT CHANGE UP (ref 7–23)
BUN SERPL-MCNC: 16 MG/DL — SIGNIFICANT CHANGE UP (ref 7–23)
BUN SERPL-MCNC: 17 MG/DL — SIGNIFICANT CHANGE UP (ref 7–23)
CALCIUM SERPL-MCNC: 7.7 MG/DL — LOW (ref 8.5–10.5)
CALCIUM SERPL-MCNC: 7.9 MG/DL — LOW (ref 8.5–10.5)
CALCIUM SERPL-MCNC: 8 MG/DL — LOW (ref 8.5–10.5)
CHLORIDE SERPL-SCNC: 89 MMOL/L — LOW (ref 96–108)
CHLORIDE SERPL-SCNC: 94 MMOL/L — LOW (ref 96–108)
CHLORIDE SERPL-SCNC: 96 MMOL/L — SIGNIFICANT CHANGE UP (ref 96–108)
CK SERPL-CCNC: 1954 U/L — HIGH (ref 39–308)
CK SERPL-CCNC: 1975 U/L — HIGH (ref 39–308)
CO2 SERPL-SCNC: 26 MMOL/L — SIGNIFICANT CHANGE UP (ref 22–31)
CO2 SERPL-SCNC: 27 MMOL/L — SIGNIFICANT CHANGE UP (ref 22–31)
CO2 SERPL-SCNC: 28 MMOL/L — SIGNIFICANT CHANGE UP (ref 22–31)
COCAINE METAB.OTHER UR-MCNC: NEGATIVE — SIGNIFICANT CHANGE UP
COLOR SPEC: YELLOW — SIGNIFICANT CHANGE UP
COMMENT - URINE: SIGNIFICANT CHANGE UP
CREAT SERPL-MCNC: 0.72 MG/DL — SIGNIFICANT CHANGE UP (ref 0.5–1.3)
CREAT SERPL-MCNC: 0.85 MG/DL — SIGNIFICANT CHANGE UP (ref 0.5–1.3)
CREAT SERPL-MCNC: 0.9 MG/DL — SIGNIFICANT CHANGE UP (ref 0.5–1.3)
DIFF PNL FLD: ABNORMAL
EGFR: 110 ML/MIN/1.73M2 — SIGNIFICANT CHANGE UP
EGFR: 112 ML/MIN/1.73M2 — SIGNIFICANT CHANGE UP
EGFR: 118 ML/MIN/1.73M2 — SIGNIFICANT CHANGE UP
EOSINOPHIL # BLD AUTO: 0 K/UL — SIGNIFICANT CHANGE UP (ref 0–0.5)
EOSINOPHIL NFR BLD AUTO: 0 % — SIGNIFICANT CHANGE UP (ref 0–6)
EPI CELLS # UR: ABNORMAL /HPF (ref 0–5)
ETHANOL SERPL-MCNC: 216 MG/DL — HIGH
FLUAV AG NPH QL: SIGNIFICANT CHANGE UP
FLUBV AG NPH QL: SIGNIFICANT CHANGE UP
GLUCOSE BLDC GLUCOMTR-MCNC: 127 MG/DL — HIGH (ref 70–99)
GLUCOSE SERPL-MCNC: 102 MG/DL — HIGH (ref 70–99)
GLUCOSE SERPL-MCNC: 111 MG/DL — HIGH (ref 70–99)
GLUCOSE SERPL-MCNC: 139 MG/DL — HIGH (ref 70–99)
GLUCOSE UR QL: NEGATIVE — SIGNIFICANT CHANGE UP
HCT VFR BLD CALC: 44.4 % — SIGNIFICANT CHANGE UP (ref 39–50)
HGB BLD-MCNC: 15.1 G/DL — SIGNIFICANT CHANGE UP (ref 13–17)
IMM GRANULOCYTES NFR BLD AUTO: 0.2 % — SIGNIFICANT CHANGE UP (ref 0–0.9)
KETONES UR-MCNC: 15 MG/DL
LACTATE SERPL-SCNC: 3 MMOL/L — HIGH (ref 0.4–2)
LEUKOCYTE ESTERASE UR-ACNC: NEGATIVE — SIGNIFICANT CHANGE UP
LYMPHOCYTES # BLD AUTO: 20.6 % — SIGNIFICANT CHANGE UP (ref 13–44)
LYMPHOCYTES # BLD AUTO: 3 K/UL — SIGNIFICANT CHANGE UP (ref 1–3.3)
MAGNESIUM SERPL-MCNC: 1.9 MG/DL — SIGNIFICANT CHANGE UP (ref 1.6–2.6)
MCHC RBC-ENTMCNC: 27 PG — SIGNIFICANT CHANGE UP (ref 27–34)
MCHC RBC-ENTMCNC: 34 GM/DL — SIGNIFICANT CHANGE UP (ref 32–36)
MCV RBC AUTO: 79.4 FL — LOW (ref 80–100)
METHADONE UR-MCNC: NEGATIVE — SIGNIFICANT CHANGE UP
MONOCYTES # BLD AUTO: 0.65 K/UL — SIGNIFICANT CHANGE UP (ref 0–0.9)
MONOCYTES NFR BLD AUTO: 4.5 % — SIGNIFICANT CHANGE UP (ref 2–14)
NEUTROPHILS # BLD AUTO: 10.81 K/UL — HIGH (ref 1.8–7.4)
NEUTROPHILS NFR BLD AUTO: 74.4 % — SIGNIFICANT CHANGE UP (ref 43–77)
NITRITE UR-MCNC: NEGATIVE — SIGNIFICANT CHANGE UP
NRBC # BLD: 0 /100 WBCS — SIGNIFICANT CHANGE UP (ref 0–0)
OPIATES UR-MCNC: NEGATIVE — SIGNIFICANT CHANGE UP
PCP SPEC-MCNC: SIGNIFICANT CHANGE UP
PCP UR-MCNC: NEGATIVE — SIGNIFICANT CHANGE UP
PH UR: 6 — SIGNIFICANT CHANGE UP (ref 5–8)
PHOSPHATE SERPL-MCNC: 3.3 MG/DL — SIGNIFICANT CHANGE UP (ref 2.5–4.5)
PLATELET # BLD AUTO: 230 K/UL — SIGNIFICANT CHANGE UP (ref 150–400)
POTASSIUM SERPL-MCNC: 2.8 MMOL/L — CRITICAL LOW (ref 3.5–5.3)
POTASSIUM SERPL-MCNC: 2.8 MMOL/L — CRITICAL LOW (ref 3.5–5.3)
POTASSIUM SERPL-MCNC: 3 MMOL/L — LOW (ref 3.5–5.3)
POTASSIUM SERPL-SCNC: 2.8 MMOL/L — CRITICAL LOW (ref 3.5–5.3)
POTASSIUM SERPL-SCNC: 2.8 MMOL/L — CRITICAL LOW (ref 3.5–5.3)
POTASSIUM SERPL-SCNC: 3 MMOL/L — LOW (ref 3.5–5.3)
PROT SERPL-MCNC: 7.2 G/DL — SIGNIFICANT CHANGE UP (ref 6.4–8.2)
PROT SERPL-MCNC: 8 G/DL — SIGNIFICANT CHANGE UP (ref 6.4–8.2)
PROT UR-MCNC: 30 MG/DL
RBC # BLD: 5.59 M/UL — SIGNIFICANT CHANGE UP (ref 4.2–5.8)
RBC # FLD: 14.6 % — HIGH (ref 10.3–14.5)
RBC CASTS # UR COMP ASSIST: < 5 /HPF — SIGNIFICANT CHANGE UP
RSV RNA NPH QL NAA+NON-PROBE: SIGNIFICANT CHANGE UP
SARS-COV-2 RNA SPEC QL NAA+PROBE: SIGNIFICANT CHANGE UP
SODIUM SERPL-SCNC: 133 MMOL/L — SIGNIFICANT CHANGE UP (ref 132–145)
SODIUM SERPL-SCNC: 133 MMOL/L — SIGNIFICANT CHANGE UP (ref 132–145)
SODIUM SERPL-SCNC: 134 MMOL/L — SIGNIFICANT CHANGE UP (ref 132–145)
SP GR SPEC: >=1.03 — SIGNIFICANT CHANGE UP (ref 1–1.03)
THC UR QL: NEGATIVE — SIGNIFICANT CHANGE UP
TROPONIN I, HIGH SENSITIVITY RESULT: 20.2 NG/L — SIGNIFICANT CHANGE UP
UROBILINOGEN FLD QL: 0.2 E.U./DL — SIGNIFICANT CHANGE UP
WBC # BLD: 14.53 K/UL — HIGH (ref 3.8–10.5)
WBC # FLD AUTO: 14.53 K/UL — HIGH (ref 3.8–10.5)
WBC UR QL: < 5 /HPF — SIGNIFICANT CHANGE UP

## 2023-01-01 PROCEDURE — 99285 EMERGENCY DEPT VISIT HI MDM: CPT

## 2023-01-01 PROCEDURE — 71045 X-RAY EXAM CHEST 1 VIEW: CPT | Mod: 26

## 2023-01-01 RX ORDER — POTASSIUM CHLORIDE 20 MEQ
10 PACKET (EA) ORAL
Refills: 0 | Status: COMPLETED | OUTPATIENT
Start: 2023-01-01 | End: 2023-01-01

## 2023-01-01 RX ORDER — ONDANSETRON 8 MG/1
4 TABLET, FILM COATED ORAL ONCE
Refills: 0 | Status: DISCONTINUED | OUTPATIENT
Start: 2023-01-01 | End: 2023-01-01

## 2023-01-01 RX ORDER — SODIUM CHLORIDE 9 MG/ML
1000 INJECTION INTRAMUSCULAR; INTRAVENOUS; SUBCUTANEOUS ONCE
Refills: 0 | Status: COMPLETED | OUTPATIENT
Start: 2023-01-01 | End: 2023-01-01

## 2023-01-01 RX ORDER — DIAZEPAM 5 MG
15 TABLET ORAL ONCE
Refills: 0 | Status: DISCONTINUED | OUTPATIENT
Start: 2023-01-01 | End: 2023-01-01

## 2023-01-01 RX ORDER — POTASSIUM CHLORIDE 20 MEQ
40 PACKET (EA) ORAL ONCE
Refills: 0 | Status: COMPLETED | OUTPATIENT
Start: 2023-01-01 | End: 2023-01-01

## 2023-01-01 RX ORDER — FOLIC ACID 0.8 MG
1 TABLET ORAL ONCE
Refills: 0 | Status: COMPLETED | OUTPATIENT
Start: 2023-01-01 | End: 2023-01-01

## 2023-01-01 RX ORDER — THIAMINE MONONITRATE (VIT B1) 100 MG
100 TABLET ORAL ONCE
Refills: 0 | Status: COMPLETED | OUTPATIENT
Start: 2023-01-01 | End: 2023-01-01

## 2023-01-01 RX ORDER — CALCIUM GLUCONATE 100 MG/ML
1 VIAL (ML) INTRAVENOUS ONCE
Refills: 0 | Status: COMPLETED | OUTPATIENT
Start: 2023-01-01 | End: 2023-01-01

## 2023-01-01 RX ORDER — LIDOCAINE 4 G/100G
15 CREAM TOPICAL ONCE
Refills: 0 | Status: COMPLETED | OUTPATIENT
Start: 2023-01-01 | End: 2023-01-01

## 2023-01-01 RX ORDER — DIAZEPAM 5 MG
10 TABLET ORAL ONCE
Refills: 0 | Status: DISCONTINUED | OUTPATIENT
Start: 2023-01-01 | End: 2023-01-01

## 2023-01-01 RX ADMIN — Medication 1 MILLIGRAM(S): at 12:32

## 2023-01-01 RX ADMIN — SODIUM CHLORIDE 1000 MILLILITER(S): 9 INJECTION INTRAMUSCULAR; INTRAVENOUS; SUBCUTANEOUS at 18:39

## 2023-01-01 RX ADMIN — SODIUM CHLORIDE 1000 MILLILITER(S): 9 INJECTION INTRAMUSCULAR; INTRAVENOUS; SUBCUTANEOUS at 14:25

## 2023-01-01 RX ADMIN — SODIUM CHLORIDE 1000 MILLILITER(S): 9 INJECTION INTRAMUSCULAR; INTRAVENOUS; SUBCUTANEOUS at 14:32

## 2023-01-01 RX ADMIN — Medication 100 GRAM(S): at 17:03

## 2023-01-01 RX ADMIN — SODIUM CHLORIDE 1000 MILLILITER(S): 9 INJECTION INTRAMUSCULAR; INTRAVENOUS; SUBCUTANEOUS at 19:18

## 2023-01-01 RX ADMIN — Medication 2 MILLIGRAM(S): at 18:04

## 2023-01-01 RX ADMIN — LIDOCAINE 15 MILLILITER(S): 4 CREAM TOPICAL at 22:12

## 2023-01-01 RX ADMIN — Medication 100 MILLIEQUIVALENT(S): at 19:39

## 2023-01-01 RX ADMIN — Medication 40 MILLIEQUIVALENT(S): at 14:25

## 2023-01-01 RX ADMIN — Medication 1 GRAM(S): at 18:04

## 2023-01-01 RX ADMIN — Medication 15 MILLIGRAM(S): at 23:30

## 2023-01-01 RX ADMIN — Medication 2 MILLIGRAM(S): at 12:24

## 2023-01-01 RX ADMIN — Medication 100 MILLIEQUIVALENT(S): at 14:26

## 2023-01-01 RX ADMIN — Medication 10 MILLIGRAM(S): at 21:47

## 2023-01-01 RX ADMIN — Medication 100 MILLIEQUIVALENT(S): at 21:39

## 2023-01-01 RX ADMIN — Medication 100 MILLIGRAM(S): at 12:32

## 2023-01-01 RX ADMIN — Medication 100 MILLIEQUIVALENT(S): at 15:37

## 2023-01-01 RX ADMIN — SODIUM CHLORIDE 1000 MILLILITER(S): 9 INJECTION INTRAMUSCULAR; INTRAVENOUS; SUBCUTANEOUS at 12:27

## 2023-01-01 RX ADMIN — Medication 2 MILLIGRAM(S): at 14:45

## 2023-01-01 RX ADMIN — Medication 10 MILLIEQUIVALENT(S): at 15:37

## 2023-01-01 NOTE — ED PROVIDER NOTE - CONSTITUTIONAL, MLM
normal... Well appearing, awake, alert, oriented to person, place, time/situation and in no apparent distress.  Slightly diaphoretic/anxious appearing.

## 2023-01-01 NOTE — ED PROVIDER NOTE - DIFFERENTIAL DIAGNOSIS
ETOH withdrawal, DTs, sepsis, dehydration, electrolyte imbalance, substance abuse Differential Diagnosis

## 2023-01-01 NOTE — H&P ADULT - PROBLEM SELECTOR PLAN 6
Met criteria, but does not appear infectious. Denies fevers at home, Leukocytosis likely reactive.     Plan:   - ceftriaxone 100 mg   - flagyl

## 2023-01-01 NOTE — ED ADULT NURSE NOTE - NS ED NURSE REPORT GIVEN DT
Normal vision: sees adequately in most situations; can see medication labels, newsprint
01-Jan-2023 22:09

## 2023-01-01 NOTE — ED ADULT NURSE NOTE - SEPSIS REFERENCE DATA CRITERIA 1
normal... Well appearing, awake, alert, oriented to person, place, time/situation and in no apparent distress.  Pt verbalizing in full, clear, effortless sentences. Abormal VS: Temp > 100F or < 96.8F; SBP < 90 mmHG; HR > 120bpm; Resp > 24/min

## 2023-01-01 NOTE — H&P ADULT - PROBLEM SELECTOR PLAN 1
Patient has been drinking for 3 weeks, was previously in remission and attending AA meetings. Endorses 4-5 drinks per day, last drink was :   Alcohol level on admission: 216  AST/ALT: 148/96    Plan:    - CIWA protocol   - 2mg IV push for CIWA >8   - Librium 50 mg q8   - Thiamine 300 mg q24 X 3 days   - folic acid 1 g   - fall precautions   - seizure precautions Patient has been drinking for 3 weeks, was previously in remission and attending AA meetings. Endorses 4-5 drinks per day, last drink was : 8 pm New Years Allegra. Alcohol level on admission: 216. AST/ALT: 148/96. Received Ativan and Valium in ED as he was not tolerating PO.     Plan:    - CIWA protocol   - 2mg IV push for CIWA >8   - Librium 50 mg q8  - Thiamine 300 mg q24 X 3 days   - folic acid 1 g   - fall precautions   - seizure precautions

## 2023-01-01 NOTE — H&P ADULT - ATTENDING COMMENTS
Patient is a 41 yo male with a PMH of alcohol use disorder who comes presented to [facility] for symptoms of acute alcohol withdrawal. The patient has been in remission for the past year until 12/22 at which time he started drinking vodka and beer (unable to quantify how much). He presented on 12/28/2022 to [facility] for medical evaluation, was given librium, but ultimately eloped and continued drinking. Per the patient, his last drink was on New Year's Allegra around 8pm. Since then, he has experienced tachycardia and anxiety, which prompted him go to the ED. Last hospitalization for alcohol withdrawal was one year ago at [facility], and prior to that in 2016. He has not been intubated nor has he had withdrawal seizures in the past. Patient otherwise has not chronic medical conditions nor does he take any medications. The patient endorses a 20 pack year smoking history, quit in his early 30's. He denies other drug use, works in IT at [employer] and lives with his mother and father    patient required quite a bit of benzo at [facility]. spiked a fever at [facility]. given a dose of ceftriaxone, hx of alcholism, denies seziure but lactate elevation is consistent with seizure. will monitor for seizures, librium standing, prn ativan, folic acid and thiamine, seizure and fall precautions. ciwa protocol.   DVT ppx.

## 2023-01-01 NOTE — ED PROVIDER NOTE - ENMT, MLM
Airway patent, Nasal mucosa clear. Mouth with normal mucosa. Throat has no vesicles, no oropharyngeal exudates and uvula is midline.  No signs of head trauma.

## 2023-01-01 NOTE — ED PROVIDER NOTE - CLINICAL SUMMARY MEDICAL DECISION MAKING FREE TEXT BOX
Pt presents with ETOH withdrawal.  Treated with IV benzos and IV fluids.  Hypokalemia & hypocalcemia (correcting) and elevated CPK.  Hemodynamically stable.  CIWA still around 10.  Will need to admit to Stepdown Unit at [facility].

## 2023-01-01 NOTE — ED PROVIDER NOTE - PROGRESS NOTE DETAILS
Still tachy but tremors improved.  Awaiting repeat labs. prerna: Spoke to Dr. verona segura from telemetry stepdown he agrees to do blood cultures lactate and to give 1 dose of Rocephin here in the ER.  He is aware of update the patient has a small spike in fever although chest x-ray and urine appears clear.  Patient is otherwise been given increased doses of benzo diazepam in initially Valium 10 mg and now up to Valium 15 mg IV with some improvement in his anxiety and CIWA score as he was initially contemplating on leaving AGAINST MEDICAL ADVICE.  I had a very long discussion with the patient as to why this is not recommended given his moderate and possibly severe alcohol withdrawal, in the setting of seizure, likely.  Patient then coherently inform me that he will stay for admission.  Patient is a  To be transported

## 2023-01-01 NOTE — H&P ADULT - PROBLEM SELECTOR PLAN 5
F: s/p 3 L NS in ED, replete now per oral    E: K>4, Mg>2  N: Regular diet  DVT: Lovenox 40 mg q24  CODE: F: s/p 3 L NS in ED, replete now per oral    E: K>4, Mg>2  N: Regular diet  DVT: Lovenox 40 mg q24  CODE: FULL CODE

## 2023-01-01 NOTE — ED PROVIDER NOTE - CARE PLAN
Principal Discharge DX:	Alcohol withdrawal  Secondary Diagnosis:	Lactic acidosis  Secondary Diagnosis:	Hypokalemia  Secondary Diagnosis:	Hypocalcemia   1

## 2023-01-01 NOTE — ED ADULT TRIAGE NOTE - CHIEF COMPLAINT QUOTE
here for alcohol withdrawal. Pt is here for palpitations, feeling tremulous and having the shakes. Pt is tachycardic in triage. Last drink was yesterday in the afternoon.

## 2023-01-01 NOTE — ED PROVIDER NOTE - OBJECTIVE STATEMENT
He has a PMHx of alcohol abuse.  Was sober for one year but "fell off the wagon" over the past week.  Has been drinking very heavily and today felt very tremulous with palpitations/tachycardia.  Having symptoms similar to when he has gone into withdrawal in lcohol withdrawal in the past, no history of DT/seizures, last episode 1 yr ago, goes to AA, presenting today complaining of alcohol withdrawal symptoms x24 hours.  Patient reports binge drinking for 7 days, usually 12 pack of beers daily and last drink approximately at 2 PM yesterday. Noted having some shakes today, tremor, nausea and 1 episode NBNB emesis yesterday and mild headache this morning as well. Denies seizure activities, LOC, SOB, CP, palpitations, N/V/D/C, change in ROM/sensation, abdominal/back/neck pain, urinary/bowel incontinence, focal weakness, AH/VH/delusion, change in vision/gait/speech, dysequilibrium, paresthesia, numbness, tingling, diplopia, peripheral edema, and malaise He has a PMHx of alcohol abuse.  Was sober for one year but "fell off the wagon" over the past week.  Has been drinking very heavily and today felt very tremulous with palpitations/tachycardia as well as nausea/vomiting.  Having symptoms similar to when he has gone into withdrawal in the past.  Has had ETOH withdrawal in the past on several occasions but only had to be hospitalized for it once (reportedly at [facility]).  No history of DTs/seizures.  States his last drink was yesterday evening. Patient reports binge drinking for 7 days, usually 12 pack of beers daily.  Denies any other substance abuse.  Has mild headache.  Denies visual complaints.  No AH/VH.  No syncope.  No focal motor deficits or other neurologic complaints.  No vertigo or dizziness. He has a PMHx of alcohol abuse.  Was sober for one year but "fell off the wagon" over the past week.  Has been drinking very heavily and today felt very tremulous with palpitations/tachycardia as well as nausea/vomiting.  Having symptoms similar to when he has gone into withdrawal in the past.  Has had ETOH withdrawal in the past on several occasions but only had to be hospitalized for it once (reportedly at [facility]).  No history of DTs/seizures.  States his last drink was yesterday evening. Patient reports binge drinking for 7 days, usually 12 pack of beers daily.  Denies any other substance abuse.  Has mild headache.  Denies visual complaints.  No AH/VH.  No syncope.  Denies recent head injury or fall.  No focal motor deficits or other neurologic complaints.  No vertigo or dizziness.  Denies CP or SOB.  Denies abdominal pain.  No flu-like symptoms or fevers.

## 2023-01-01 NOTE — H&P ADULT - HISTORY OF PRESENT ILLNESS
Patient is a 41 yo male with a PMH of alcohol use disorder who comes presented to [facility] for symptoms of acute alcohol withdrawal. The patient has been in remission for ___________ until 3 weeks ago at which time he started drinking ____ drinks a day. He presented on 12/28/2022 to [facility] for medical evaluation but ultimately eloped. Patient presents today with his father. Per the patient, his last drink was on _________. Since then, he has experienced _______________. He has/hasnt been hospitalized in the past for acute alcohol withdrawal.    seizures?   intubations?    Patient is a 41 yo male with a PMH of alcohol use disorder who comes presented to [facility] for symptoms of acute alcohol withdrawal. The patient has been in remission for the past year until 12/22 at which time he started drinking vodka and beer (unable to quantify how much). He presented on 12/28/2022 to [facility] for medical evaluation, was given librium, but ultimately eloped and continued drinking. Per the patient, his last drink was on New Year's Allegra around 8pm. Since then, he has experienced tachycardia and anxiety, which prompted him go to the ED. Last hospitalization for alcohol withdrawal was one year ago at [facility], and prior to that in 2016. He has not been intubated nor has he had withdrawal seizures in the past. Patient otherwise has not chronic medical conditions nor does he take any medications. The patient endorses a 20 pack year smoking history, quit in his early 30's. 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     ED course:   Vitals:   Labs:   Imaging:   Intervention:      Patient is a 43 yo male with a PMH of alcohol use disorder who comes presented to [facility] for symptoms of acute alcohol withdrawal. The patient has been in remission for the past year until  at which time he started drinking vodka and beer (unable to quantify how much). He presented on 2022 to [facility] for medical evaluation, was given librium, but ultimately eloped and continued drinking. Per the patient, his last drink was on New Year's Allegra around 8pm. Since then, he has experienced tachycardia and anxiety, which prompted him go to the ED. Last hospitalization for alcohol withdrawal was one year ago at [facility], and prior to that in 2016. He has not been intubated nor has he had withdrawal seizures in the past. Patient otherwise has not chronic medical conditions nor does he take any medications. The patient endorses a 20 pack year smoking history, quit in his early 30's. 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     ED course:   Vitals: T: 99, BP: 114/72, HR: 145, RR 19, 99% on RA   Labs: WBC: 14.53, lactate: 3.0, K: 2.8, A, Ca: 8.0, AST/ALT: 148/96, CK: 1954, BAL: 216, RVP: negative, COVID: negative  Intervention: Ativan 2mgX3, Valium IV 10 mg, Valium IV 15 mg, Thiamine 100 mg, Folic acid 1g, Ceftriaxone 1 g, 3 L NS, Ceftriaxone 2000 mg, tylenol 650, 1 g calcium gluconate, K repleted

## 2023-01-01 NOTE — H&P ADULT - NSHPREVIEWOFSYSTEMS_GEN_ALL_CORE
REVIEW OF SYSTEMS:  CONSTITUTIONAL: No weakness, fevers or chills  EYES/ENT: No visual changes;  No vertigo or throat pain   NECK: No pain or stiffness  RESPIRATORY: No cough, wheezing, hemoptysis; No shortness of breath  CARDIOVASCULAR: No chest pain or palpitations  GASTROINTESTINAL: No abdominal or epigastric pain. No nausea, vomiting, or hematemesis; No diarrhea or constipation. No melena or hematochezia.  GENITOURINARY: No dysuria, frequency or hematuria  NEUROLOGICAL: No numbness or weakness  SKIN: No itching, rashes REVIEW OF SYSTEMS:  CONSTITUTIONAL: + weakness, no fevers or chills  EYES/ENT: No visual changes;  No vertigo or throat pain   NECK: No pain or stiffness  RESPIRATORY: No cough, wheezing, hemoptysis; No shortness of breath  CARDIOVASCULAR: No chest pain or palpitations  GASTROINTESTINAL: No abdominal or epigastric pain. mild nausea in ED, not currently, vomiting, or hematemesis; No diarrhea or constipation. No melena or hematochezia.  GENITOURINARY: No dysuria, frequency or hematuria  NEUROLOGICAL: No numbness or weakness  SKIN: No itching, rashes

## 2023-01-01 NOTE — H&P ADULT - NSHPLABSRESULTS_GEN_ALL_CORE
.  LABS:                         15.1   14.53 )-----------( 230      ( 01 Jan 2023 11:56 )             44.4     01-01    133  |  94<L>  |  16  ----------------------------<  111<H>  2.8<LL>   |  27  |  0.85    Ca    7.9<L>      01 Jan 2023 17:41  Phos  3.3     01-01  Mg     1.9     01-01    TPro  8.0  /  Alb  3.5  /  TBili  1.1  /  DBili  0.4<H>  /  AST  148<H>  /  ALT  96<H>  /  AlkPhos  107  01-01      Urinalysis Basic - ( 01 Jan 2023 14:48 )    Color: Yellow / Appearance: Clear / SG: >=1.030 / pH: x  Gluc: x / Ketone: 15 mg/dL  / Bili: NEGATIVE / Urobili: 0.2 E.U./dL   Blood: x / Protein: 30 mg/dL / Nitrite: NEGATIVE   Leuk Esterase: NEGATIVE / RBC: < 5 /HPF / WBC < 5 /HPF   Sq Epi: x / Non Sq Epi: 5-10 /HPF / Bacteria: Present /HPF      CARDIAC MARKERS ( 01 Jan 2023 17:41 )  x     / x     / 1975 U/L / x     / x      CARDIAC MARKERS ( 01 Jan 2023 11:56 )  x     / x     / 1954 U/L / x     / x            Lactate, Blood: 3.0 mmoL/L (01-01 @ 17:41)      RADIOLOGY, EKG & ADDITIONAL TESTS: Reviewed.

## 2023-01-01 NOTE — ED ADULT NURSE REASSESSMENT NOTE - NS ED NURSE REASSESS COMMENT FT1
Pt. received from ALEM Fisher, resting comfortably in bed with father at bedside, potassium running with no complaints. CIWA documented, pt. awaiting admission to [facility].

## 2023-01-01 NOTE — H&P ADULT - PROBLEM SELECTOR PLAN 2
Likely reactive i/s/o acute withdrawal. No infectious symptoms at this time.     Plan:   - f/u AM CBC 2.8 on admission, repleted IV. Unable to tolerate PO 2/2 nausea. 2.8 repeat, IV repletion.      Plan:   - Replete IV until patient can tolerate PO   - BMP q8 for now   - f/u EKG

## 2023-01-01 NOTE — H&P ADULT - PROBLEM SELECTOR PLAN 4
Lactate elevated to 3.0 + elevated CK, Cannot rule out seizures. Infectious source unlikely, UA contaminated and patient asymptomatic. No respiratory symptoms, BM's regular. Fluid resuscitated w/ 3 L NS in ED.     Plan:   - ativan for seizures per [facility] protocol   - ntd at this time   - monitor infectious symptoms but low on ddx Lactate elevated to 3.0 + elevated CK, Cannot rule out seizures though patient declines seizures in past and declines periods of unconsciousness or inability to recall. Infectious source unlikely, UA contaminated and patient asymptomatic. No respiratory symptoms, BM's regular. Fluid resuscitated w/ 3 L NS in ED.     Plan:   - ativan for seizures per CIWA protocol   - ntd at this time   - monitor infectious symptoms but low on ddx

## 2023-01-01 NOTE — ED PROVIDER NOTE - NEUROLOGICAL, MLM
Alert and oriented, no focal deficits, no motor or sensory deficits.  Visible tremors, mostly in upper extremities.

## 2023-01-01 NOTE — H&P ADULT - ASSESSMENT
Patient is a 42 year old male with PMHx of alcohol use disorder, who presented with symptoms of alcohol withdrawal including tremulousness and elevated heart rate, admitted for acute alcohol withdrawal.  Patient is a 42 year old male with PMHx of alcohol use disorder, who presented with symptoms of alcohol withdrawal including tremulousness and elevated heart rate, admitted for acute alcohol withdrawal and sepsis.

## 2023-01-01 NOTE — H&P ADULT - PROBLEM SELECTOR PLAN 3
2.8 on admission, repleted IV. Unable to tolerate PO 2/2 nausea. 2.8 repeat, IV repletion.      Plan:   - Replete IV until patient can tolerate PO   - BMP q8 for now   - f/u EKG Likely reactive i/s/o acute withdrawal. No infectious symptoms at this time.     Plan:   - f/u AM CBC Likely reactive i/s/o acute withdrawal. No infectious symptoms at this time. Had a low grade rectal temp 100.5 in ED, technically not meeting sepsis criteria.     Plan:   - f/u AM CBC Likely reactive i/s/o acute withdrawal. No infectious symptoms at this time. Had a low grade rectal temp 100.5 in ED, technically not meeting sepsis criteria. Was given ceftriazone 2000 mg X1.     Plan:   - f/u AM CBC  - would hold off on further Ab for now

## 2023-01-01 NOTE — H&P ADULT - NSHPPHYSICALEXAM_GEN_ALL_CORE
VITALS:   T(C): 37.6 (01-01-23 @ 19:25), Max: 37.6 (01-01-23 @ 19:25)  HR: 125 (01-01-23 @ 19:25) (125 - 145)  BP: 144/73 (01-01-23 @ 19:25) (114/72 - 144/73)  RR: 19 (01-01-23 @ 19:25) (16 - 19)  SpO2: 99% (01-01-23 @ 19:25) (97% - 99%)    GENERAL: NAD, lying in bed comfortably  HEAD:  Atraumatic, normocephalic  EYES: EOMI, PERRLA, conjunctiva and sclera clear  ENT: Moist mucous membranes  NECK: Supple, no JVD  HEART: Regular rate and rhythm, no murmurs, rubs, or gallops  LUNGS: Unlabored respirations.  Clear to auscultation bilaterally, no crackles, wheezing, or rhonchi  ABDOMEN: Soft, nontender, nondistended, +BS  EXTREMITIES: 2+ peripheral pulses bilaterally. No clubbing, cyanosis, or edema  NERVOUS SYSTEM:  A&Ox3, no focal deficits   SKIN: No rashes or lesions VITALS:   T(C): 37.6 (01-01-23 @ 19:25), Max: 37.6 (01-01-23 @ 19:25)  HR: 125 (01-01-23 @ 19:25) (125 - 145)  BP: 144/73 (01-01-23 @ 19:25) (114/72 - 144/73)  RR: 19 (01-01-23 @ 19:25) (16 - 19)  SpO2: 99% (01-01-23 @ 19:25) (97% - 99%)    GENERAL: NAD, lying in bed comfortably  HEAD:  Atraumatic, normocephalic  EYES: EOMI, PERRLA, conjunctiva and sclera clear  ENT: Moist mucous membranes  NECK: Supple, no JVD  HEART: Regular rate and rhythm, no murmurs, rubs, or gallops  LUNGS: Unlabored respirations.  Clear to auscultation bilaterally, no crackles, wheezing, or rhonchi  ABDOMEN: Soft, nontender, nondistended, +BS  EXTREMITIES: 2+ peripheral pulses bilaterally. No clubbing, cyanosis, or edema. Visible and palpable tremor   NERVOUS SYSTEM:  A&Ox3, no focal deficits. slightly tremulous   SKIN: No rashes or lesions

## 2023-01-02 ENCOUNTER — INPATIENT (INPATIENT)
Facility: HOSPITAL | Age: 43
LOS: 0 days | Discharge: LEFT BEFORE DISCHARGE | DRG: 871 | End: 2023-01-03
Attending: STUDENT IN AN ORGANIZED HEALTH CARE EDUCATION/TRAINING PROGRAM | Admitting: STUDENT IN AN ORGANIZED HEALTH CARE EDUCATION/TRAINING PROGRAM
Payer: COMMERCIAL

## 2023-01-02 DIAGNOSIS — D72.829 ELEVATED WHITE BLOOD CELL COUNT, UNSPECIFIED: ICD-10-CM

## 2023-01-02 DIAGNOSIS — Z00.00 ENCOUNTER FOR GENERAL ADULT MEDICAL EXAMINATION WITHOUT ABNORMAL FINDINGS: ICD-10-CM

## 2023-01-02 DIAGNOSIS — A41.9 SEPSIS, UNSPECIFIED ORGANISM: ICD-10-CM

## 2023-01-02 DIAGNOSIS — F10.239 ALCOHOL DEPENDENCE WITH WITHDRAWAL, UNSPECIFIED: ICD-10-CM

## 2023-01-02 DIAGNOSIS — E87.6 HYPOKALEMIA: ICD-10-CM

## 2023-01-02 DIAGNOSIS — R79.89 OTHER SPECIFIED ABNORMAL FINDINGS OF BLOOD CHEMISTRY: ICD-10-CM

## 2023-01-02 LAB
ANION GAP SERPL CALC-SCNC: 11 MMOL/L — SIGNIFICANT CHANGE UP (ref 5–17)
ANION GAP SERPL CALC-SCNC: 11 MMOL/L — SIGNIFICANT CHANGE UP (ref 5–17)
BASOPHILS # BLD AUTO: 0.01 K/UL — SIGNIFICANT CHANGE UP (ref 0–0.2)
BASOPHILS NFR BLD AUTO: 0.1 % — SIGNIFICANT CHANGE UP (ref 0–2)
BUN SERPL-MCNC: 11 MG/DL — SIGNIFICANT CHANGE UP (ref 7–23)
BUN SERPL-MCNC: 11 MG/DL — SIGNIFICANT CHANGE UP (ref 7–23)
CALCIUM SERPL-MCNC: 7.8 MG/DL — LOW (ref 8.4–10.5)
CALCIUM SERPL-MCNC: 7.9 MG/DL — LOW (ref 8.4–10.5)
CHLORIDE SERPL-SCNC: 96 MMOL/L — SIGNIFICANT CHANGE UP (ref 96–108)
CHLORIDE SERPL-SCNC: 96 MMOL/L — SIGNIFICANT CHANGE UP (ref 96–108)
CO2 SERPL-SCNC: 28 MMOL/L — SIGNIFICANT CHANGE UP (ref 22–31)
CO2 SERPL-SCNC: 30 MMOL/L — SIGNIFICANT CHANGE UP (ref 22–31)
CREAT SERPL-MCNC: 0.67 MG/DL — SIGNIFICANT CHANGE UP (ref 0.5–1.3)
CREAT SERPL-MCNC: 0.73 MG/DL — SIGNIFICANT CHANGE UP (ref 0.5–1.3)
EGFR: 117 ML/MIN/1.73M2 — SIGNIFICANT CHANGE UP
EGFR: 120 ML/MIN/1.73M2 — SIGNIFICANT CHANGE UP
EOSINOPHIL # BLD AUTO: 0.01 K/UL — SIGNIFICANT CHANGE UP (ref 0–0.5)
EOSINOPHIL NFR BLD AUTO: 0.1 % — SIGNIFICANT CHANGE UP (ref 0–6)
GLUCOSE SERPL-MCNC: 103 MG/DL — HIGH (ref 70–99)
GLUCOSE SERPL-MCNC: 95 MG/DL — SIGNIFICANT CHANGE UP (ref 70–99)
HCT VFR BLD CALC: 38.1 % — LOW (ref 39–50)
HGB BLD-MCNC: 12.7 G/DL — LOW (ref 13–17)
IMM GRANULOCYTES NFR BLD AUTO: 0.4 % — SIGNIFICANT CHANGE UP (ref 0–0.9)
LACTATE SERPL-SCNC: 1.8 MMOL/L — SIGNIFICANT CHANGE UP (ref 0.4–2)
LYMPHOCYTES # BLD AUTO: 2.29 K/UL — SIGNIFICANT CHANGE UP (ref 1–3.3)
LYMPHOCYTES # BLD AUTO: 32.3 % — SIGNIFICANT CHANGE UP (ref 13–44)
MAGNESIUM SERPL-MCNC: 1.7 MG/DL — SIGNIFICANT CHANGE UP (ref 1.6–2.6)
MCHC RBC-ENTMCNC: 26.9 PG — LOW (ref 27–34)
MCHC RBC-ENTMCNC: 33.3 GM/DL — SIGNIFICANT CHANGE UP (ref 32–36)
MCV RBC AUTO: 80.7 FL — SIGNIFICANT CHANGE UP (ref 80–100)
MONOCYTES # BLD AUTO: 0.49 K/UL — SIGNIFICANT CHANGE UP (ref 0–0.9)
MONOCYTES NFR BLD AUTO: 6.9 % — SIGNIFICANT CHANGE UP (ref 2–14)
NEUTROPHILS # BLD AUTO: 4.27 K/UL — SIGNIFICANT CHANGE UP (ref 1.8–7.4)
NEUTROPHILS NFR BLD AUTO: 60.2 % — SIGNIFICANT CHANGE UP (ref 43–77)
NRBC # BLD: 0 /100 WBCS — SIGNIFICANT CHANGE UP (ref 0–0)
PHOSPHATE SERPL-MCNC: 1.8 MG/DL — LOW (ref 2.5–4.5)
PLATELET # BLD AUTO: 124 K/UL — LOW (ref 150–400)
POTASSIUM SERPL-MCNC: 2.8 MMOL/L — CRITICAL LOW (ref 3.5–5.3)
POTASSIUM SERPL-MCNC: 3.1 MMOL/L — LOW (ref 3.5–5.3)
POTASSIUM SERPL-SCNC: 2.8 MMOL/L — CRITICAL LOW (ref 3.5–5.3)
POTASSIUM SERPL-SCNC: 3.1 MMOL/L — LOW (ref 3.5–5.3)
RBC # BLD: 4.72 M/UL — SIGNIFICANT CHANGE UP (ref 4.2–5.8)
RBC # FLD: 14.6 % — HIGH (ref 10.3–14.5)
SODIUM SERPL-SCNC: 135 MMOL/L — SIGNIFICANT CHANGE UP (ref 135–145)
SODIUM SERPL-SCNC: 137 MMOL/L — SIGNIFICANT CHANGE UP (ref 135–145)
WBC # BLD: 7.1 K/UL — SIGNIFICANT CHANGE UP (ref 3.8–10.5)
WBC # FLD AUTO: 7.1 K/UL — SIGNIFICANT CHANGE UP (ref 3.8–10.5)

## 2023-01-02 PROCEDURE — 99233 SBSQ HOSP IP/OBS HIGH 50: CPT | Mod: GC

## 2023-01-02 RX ORDER — ONDANSETRON 8 MG/1
4 TABLET, FILM COATED ORAL EVERY 8 HOURS
Refills: 0 | Status: DISCONTINUED | OUTPATIENT
Start: 2023-01-02 | End: 2023-01-03

## 2023-01-02 RX ORDER — ENOXAPARIN SODIUM 100 MG/ML
40 INJECTION SUBCUTANEOUS EVERY 24 HOURS
Refills: 0 | Status: DISCONTINUED | OUTPATIENT
Start: 2023-01-02 | End: 2023-01-03

## 2023-01-02 RX ORDER — THIAMINE MONONITRATE (VIT B1) 100 MG
300 TABLET ORAL ONCE
Refills: 0 | Status: DISCONTINUED | OUTPATIENT
Start: 2023-01-02 | End: 2023-01-02

## 2023-01-02 RX ORDER — METRONIDAZOLE 500 MG
500 TABLET ORAL EVERY 8 HOURS
Refills: 0 | Status: DISCONTINUED | OUTPATIENT
Start: 2023-01-02 | End: 2023-01-02

## 2023-01-02 RX ORDER — CEFTRIAXONE 500 MG/1
2000 INJECTION, POWDER, FOR SOLUTION INTRAMUSCULAR; INTRAVENOUS ONCE
Refills: 0 | Status: COMPLETED | OUTPATIENT
Start: 2023-01-02 | End: 2023-01-02

## 2023-01-02 RX ORDER — MAGNESIUM SULFATE 500 MG/ML
2 VIAL (ML) INJECTION ONCE
Refills: 0 | Status: COMPLETED | OUTPATIENT
Start: 2023-01-02 | End: 2023-01-02

## 2023-01-02 RX ORDER — POTASSIUM PHOSPHATE, MONOBASIC POTASSIUM PHOSPHATE, DIBASIC 236; 224 MG/ML; MG/ML
15 INJECTION, SOLUTION INTRAVENOUS ONCE
Refills: 0 | Status: COMPLETED | OUTPATIENT
Start: 2023-01-02 | End: 2023-01-02

## 2023-01-02 RX ORDER — POTASSIUM CHLORIDE 20 MEQ
40 PACKET (EA) ORAL DAILY
Refills: 0 | Status: DISCONTINUED | OUTPATIENT
Start: 2023-01-02 | End: 2023-01-02

## 2023-01-02 RX ORDER — ACETAMINOPHEN 500 MG
650 TABLET ORAL EVERY 6 HOURS
Refills: 0 | Status: DISCONTINUED | OUTPATIENT
Start: 2023-01-02 | End: 2023-01-03

## 2023-01-02 RX ORDER — ACETAMINOPHEN 500 MG
975 TABLET ORAL ONCE
Refills: 0 | Status: COMPLETED | OUTPATIENT
Start: 2023-01-02 | End: 2023-01-02

## 2023-01-02 RX ORDER — THIAMINE MONONITRATE (VIT B1) 100 MG
300 TABLET ORAL EVERY 24 HOURS
Refills: 0 | Status: DISCONTINUED | OUTPATIENT
Start: 2023-01-02 | End: 2023-01-03

## 2023-01-02 RX ORDER — POTASSIUM CHLORIDE 20 MEQ
10 PACKET (EA) ORAL
Refills: 0 | Status: COMPLETED | OUTPATIENT
Start: 2023-01-02 | End: 2023-01-02

## 2023-01-02 RX ORDER — POTASSIUM CHLORIDE 20 MEQ
10 PACKET (EA) ORAL ONCE
Refills: 0 | Status: DISCONTINUED | OUTPATIENT
Start: 2023-01-02 | End: 2023-01-02

## 2023-01-02 RX ORDER — CEFTRIAXONE 500 MG/1
1000 INJECTION, POWDER, FOR SOLUTION INTRAMUSCULAR; INTRAVENOUS EVERY 24 HOURS
Refills: 0 | Status: DISCONTINUED | OUTPATIENT
Start: 2023-01-02 | End: 2023-01-02

## 2023-01-02 RX ORDER — POTASSIUM CHLORIDE 20 MEQ
40 PACKET (EA) ORAL
Refills: 0 | Status: COMPLETED | OUTPATIENT
Start: 2023-01-02 | End: 2023-01-02

## 2023-01-02 RX ORDER — LANOLIN ALCOHOL/MO/W.PET/CERES
3 CREAM (GRAM) TOPICAL AT BEDTIME
Refills: 0 | Status: DISCONTINUED | OUTPATIENT
Start: 2023-01-02 | End: 2023-01-03

## 2023-01-02 RX ADMIN — Medication 50 MILLIGRAM(S): at 06:13

## 2023-01-02 RX ADMIN — Medication 100 MILLIEQUIVALENT(S): at 04:41

## 2023-01-02 RX ADMIN — Medication 40 MILLIEQUIVALENT(S): at 18:24

## 2023-01-02 RX ADMIN — Medication 50 MILLIGRAM(S): at 18:24

## 2023-01-02 RX ADMIN — Medication 40 MILLIEQUIVALENT(S): at 12:15

## 2023-01-02 RX ADMIN — Medication 100 MILLIEQUIVALENT(S): at 03:16

## 2023-01-02 RX ADMIN — ENOXAPARIN SODIUM 40 MILLIGRAM(S): 100 INJECTION SUBCUTANEOUS at 19:41

## 2023-01-02 RX ADMIN — POTASSIUM PHOSPHATE, MONOBASIC POTASSIUM PHOSPHATE, DIBASIC 62.5 MILLIMOLE(S): 236; 224 INJECTION, SOLUTION INTRAVENOUS at 14:11

## 2023-01-02 RX ADMIN — Medication 2 MILLIGRAM(S): at 19:41

## 2023-01-02 RX ADMIN — Medication 100 MILLIGRAM(S): at 06:16

## 2023-01-02 RX ADMIN — ONDANSETRON 4 MILLIGRAM(S): 8 TABLET, FILM COATED ORAL at 08:52

## 2023-01-02 RX ADMIN — Medication 25 GRAM(S): at 08:52

## 2023-01-02 RX ADMIN — Medication 300 MILLIGRAM(S): at 12:48

## 2023-01-02 RX ADMIN — CEFTRIAXONE 100 MILLIGRAM(S): 500 INJECTION, POWDER, FOR SOLUTION INTRAMUSCULAR; INTRAVENOUS at 06:16

## 2023-01-02 RX ADMIN — Medication 975 MILLIGRAM(S): at 00:17

## 2023-01-02 RX ADMIN — Medication 100 MILLIEQUIVALENT(S): at 06:08

## 2023-01-02 RX ADMIN — CEFTRIAXONE 100 MILLIGRAM(S): 500 INJECTION, POWDER, FOR SOLUTION INTRAMUSCULAR; INTRAVENOUS at 00:17

## 2023-01-02 RX ADMIN — Medication 100 MILLIEQUIVALENT(S): at 03:42

## 2023-01-02 RX ADMIN — Medication 40 MILLIEQUIVALENT(S): at 15:25

## 2023-01-02 NOTE — PROGRESS NOTE ADULT - SUBJECTIVE AND OBJECTIVE BOX
Hospital course:  Patient is a 43 yo male with a PMH of alcohol use disorder (no prior seizures, DT's)  who comesfor symptoms of acute alcohol withdrawal. The patient has been in remission for the past year until 12/22 at which time he started drinking vodka and beer (unable to quantify how much). He presented on 12/28/2022 to [facility] for medical evaluation, was given librium, but ultimately eloped and continued drinking. Per the patient, his last drink was on New Year's Allegra around 8pm. Since then, he has experienced tachycardia and anxiety, which prompted him go to the ED. Last hospitalization for alcohol withdrawal was one year ago at [facility], and prior to that in 2016. The patient endorses a 20 pack year smoking history, quit in his early 30's. He was started on CIWA protocol (librium 50 q8hr) with prn ativan. Aggressive electrolte repletion for hypokalemia. Given elevated WBC and concern for aspiration pneumonitis, was given CTX. H/o d/c'd, given WBC downtrending and clear CXR.     INTERVAL HPI/OVERNIGHT EVENTS:  Patient seen and examined at bedside. Feels well, no complaints. Endorses history above, says he fell off the wagon and began drinking 12 beers a day. No symptoms at present. Patient denies fever, chills, dizziness, weakness, HA, CP, SOB, N/V/D/C    VITALS  Vital Signs Last 24 Hrs  T(C): 37.3 (02 Jan 2023 06:45), Max: 38.1 (01 Jan 2023 23:52)  T(F): 99.1 (02 Jan 2023 06:45), Max: 100.5 (01 Jan 2023 23:52)  HR: 102 (02 Jan 2023 04:08) (102 - 145)  BP: 138/82 (02 Jan 2023 04:08) (114/72 - 167/87)  BP(mean): 103 (02 Jan 2023 04:08) (103 - 117)  RR: 16 (02 Jan 2023 04:08) (16 - 19)  SpO2: 96% (02 Jan 2023 04:08) (96% - 99%)    Parameters below as of 02 Jan 2023 04:08  Patient On (Oxygen Delivery Method): room air        CAPILLARY BLOOD GLUCOSE      POCT Blood Glucose.: 127 mg/dL (01 Jan 2023 10:58)      PHYSICAL EXAM  General: NAD, sitting comfortably in bed   HEENT: EOMI, no scleral icterus, MMM. No tongue bite.   Neck: Supple, no JVD  Respiratory: lungs CTA b/l, no wheezes/crackles, no accessory muscle use  Cardiovascular: Regular rhythm/rate; +S1 +S2, no murmurs  Gastrointestinal: Soft, NTND, normoactive BS, no rebound, no guarding  Genitourinary: no suprapubic tenderness  Extremities: WWP, no cyanosis, no clubbing, no edema, pulses equal  Neurological: A&Ox3, no gross focal deficits, follows commands  Skin: Normal temperature, warm, dry    MEDICATIONS  (STANDING):  chlordiazePOXIDE 50 milliGRAM(s) Oral every 12 hours  potassium chloride    Tablet ER 40 milliEquivalent(s) Oral daily  potassium phosphate IVPB 15 milliMole(s) IV Intermittent once  thiamine 300 milliGRAM(s) Oral every 24 hours    MEDICATIONS  (PRN):  acetaminophen     Tablet .. 650 milliGRAM(s) Oral every 6 hours PRN Temp greater or equal to 38C (100.4F), Mild Pain (1 - 3)  aluminum hydroxide/magnesium hydroxide/simethicone Suspension 30 milliLiter(s) Oral every 4 hours PRN Dyspepsia  LORazepam   Injectable 2 milliGRAM(s) IV Push every 1 hour PRN CIWA-Ar score 8 or greater  melatonin 3 milliGRAM(s) Oral at bedtime PRN Insomnia  ondansetron Injectable 4 milliGRAM(s) IV Push every 8 hours PRN Nausea and/or Vomiting      No Known Allergies      LABS                        12.7   7.10  )-----------( 124      ( 02 Jan 2023 05:30 )             38.1     01-02    135  |  96  |  11  ----------------------------<  95  2.8<LL>   |  28  |  0.67    Ca    7.9<L>      02 Jan 2023 05:30  Phos  1.8     01-02  Mg     1.7     01-02    TPro  7.2  /  Alb  3.1<L>  /  TBili  1.6<H>  /  DBili  x   /  AST  140<H>  /  ALT  84<H>  /  AlkPhos  95  01-01      Urinalysis Basic - ( 01 Jan 2023 14:48 )    Color: Yellow / Appearance: Clear / SG: >=1.030 / pH: x  Gluc: x / Ketone: 15 mg/dL  / Bili: NEGATIVE / Urobili: 0.2 E.U./dL   Blood: x / Protein: 30 mg/dL / Nitrite: NEGATIVE   Leuk Esterase: NEGATIVE / RBC: < 5 /HPF / WBC < 5 /HPF   Sq Epi: x / Non Sq Epi: 5-10 /HPF / Bacteria: Present /HPF      CARDIAC MARKERS ( 01 Jan 2023 17:41 )  x     / x     / 1975 U/L / x     / x      CARDIAC MARKERS ( 01 Jan 2023 11:56 )  x     / x     / 1954 U/L / x     / x            RADIOLOGY & ADDITIONAL TESTS: Reviewed   Hospital course:  Patient is a 43 yo male with a PMH of alcohol use disorder (no prior seizures, DT's)  who comesfor symptoms of acute alcohol withdrawal. The patient has been in remission for the past year until 12/22 at which time he started drinking vodka and beer (unable to quantify how much). He presented on 12/28/2022 to [facility] for medical evaluation, was given librium, but ultimately eloped and continued drinking. Per the patient, his last drink was on New Year's Allegra around 8pm. Since then, he has experienced tachycardia and anxiety, which prompted him go to the ED. Last hospitalization for alcohol withdrawal was one year ago at [facility], and prior to that in 2016. The patient endorses a 20 pack year smoking history, quit in his early 30's. He was started on CIWA protocol (librium 50 q8hr) with prn ativan. Aggressive electrolte repletion for hypokalemia. Given elevated WBC and concern for aspiration pneumonitis, was given CTX. H/o d/c'd, given WBC downtrending and clear CXR.     INTERVAL HPI/OVERNIGHT EVENTS:  Patient seen and examined at bedside. Feels well, no complaints. Endorses history above, says he fell off the wagon and began drinking 12 beers a day. No symptoms at present. Patient denies fever, chills, dizziness, weakness, HA, CP, SOB, N/V/D/C    VITALS  Vital Signs Last 24 Hrs  T(C): 37.3 (02 Jan 2023 06:45), Max: 38.1 (01 Jan 2023 23:52)  T(F): 99.1 (02 Jan 2023 06:45), Max: 100.5 (01 Jan 2023 23:52)  HR: 102 (02 Jan 2023 04:08) (102 - 145)  BP: 138/82 (02 Jan 2023 04:08) (114/72 - 167/87)  BP(mean): 103 (02 Jan 2023 04:08) (103 - 117)  RR: 16 (02 Jan 2023 04:08) (16 - 19)  SpO2: 96% (02 Jan 2023 04:08) (96% - 99%)    Parameters below as of 02 Jan 2023 04:08  Patient On (Oxygen Delivery Method): room air        CAPILLARY BLOOD GLUCOSE      POCT Blood Glucose.: 127 mg/dL (01 Jan 2023 10:58)      PHYSICAL EXAM  General: NAD, sitting comfortably in bed CIWA 2-3.   HEENT: EOMI, no scleral icterus, MMM. No tongue bite.   Neck: Supple, no JVD  Respiratory: lungs CTA b/l, no wheezes/crackles, no accessory muscle use  Cardiovascular: Regular rhythm/rate; +S1 +S2, no murmurs  Gastrointestinal: Soft, NTND, normoactive BS, no rebound, no guarding  Genitourinary: no suprapubic tenderness  Extremities: WWP, no cyanosis, no clubbing, no edema, pulses equal  Neurological: A&Ox3, no gross focal deficits, follows commands  Skin: Normal temperature, warm, dry    MEDICATIONS  (STANDING):  chlordiazePOXIDE 50 milliGRAM(s) Oral every 12 hours  potassium chloride    Tablet ER 40 milliEquivalent(s) Oral daily  potassium phosphate IVPB 15 milliMole(s) IV Intermittent once  thiamine 300 milliGRAM(s) Oral every 24 hours    MEDICATIONS  (PRN):  acetaminophen     Tablet .. 650 milliGRAM(s) Oral every 6 hours PRN Temp greater or equal to 38C (100.4F), Mild Pain (1 - 3)  aluminum hydroxide/magnesium hydroxide/simethicone Suspension 30 milliLiter(s) Oral every 4 hours PRN Dyspepsia  LORazepam   Injectable 2 milliGRAM(s) IV Push every 1 hour PRN CIWA-Ar score 8 or greater  melatonin 3 milliGRAM(s) Oral at bedtime PRN Insomnia  ondansetron Injectable 4 milliGRAM(s) IV Push every 8 hours PRN Nausea and/or Vomiting      No Known Allergies      LABS                        12.7   7.10  )-----------( 124      ( 02 Jan 2023 05:30 )             38.1     01-02    135  |  96  |  11  ----------------------------<  95  2.8<LL>   |  28  |  0.67    Ca    7.9<L>      02 Jan 2023 05:30  Phos  1.8     01-02  Mg     1.7     01-02    TPro  7.2  /  Alb  3.1<L>  /  TBili  1.6<H>  /  DBili  x   /  AST  140<H>  /  ALT  84<H>  /  AlkPhos  95  01-01      Urinalysis Basic - ( 01 Jan 2023 14:48 )    Color: Yellow / Appearance: Clear / SG: >=1.030 / pH: x  Gluc: x / Ketone: 15 mg/dL  / Bili: NEGATIVE / Urobili: 0.2 E.U./dL   Blood: x / Protein: 30 mg/dL / Nitrite: NEGATIVE   Leuk Esterase: NEGATIVE / RBC: < 5 /HPF / WBC < 5 /HPF   Sq Epi: x / Non Sq Epi: 5-10 /HPF / Bacteria: Present /HPF      CARDIAC MARKERS ( 01 Jan 2023 17:41 )  x     / x     / 1975 U/L / x     / x      CARDIAC MARKERS ( 01 Jan 2023 11:56 )  x     / x     / 1954 U/L / x     / x            RADIOLOGY & ADDITIONAL TESTS: Reviewed Discontinue Regimen: Fluocinonide ointment Initiate Treatment: Clobetasol bid, apply with damp cotton clothes or with wet skin at night Detail Level: Zone

## 2023-01-02 NOTE — PROGRESS NOTE ADULT - PROBLEM SELECTOR PLAN 2
2.8 on admission, repleted IV. Unable to tolerate PO 2/2 nausea. 2.8 repeat, IV repletion.      Plan:   - Replete IV until patient can tolerate PO   - BMP q8 for now   - f/u EKG Met criteria (T: 100.5, WBC 14.5) Lactate 3.0 on arrival. Patient is non-toxic appearing. C/o sore throat, but no cough, congestion, SOB. Started on CTX and Flagyl. No abd tenderness on exam. Suspicion for chemical pneumonitis.  H/o CXR clear, leukocytosis resolved, fever downtrended. Will monitor off abx for now.   - Continue to monitor

## 2023-01-02 NOTE — PROGRESS NOTE ADULT - PROBLEM SELECTOR PLAN 5
F: s/p 3 L NS in ED, replete now per oral    E: K>4, Mg>2  N: Regular diet  DVT: Lovenox 40 mg q24  CODE: FULL CODE

## 2023-01-02 NOTE — PROGRESS NOTE ADULT - PROBLEM SELECTOR PLAN 3
Likely reactive i/s/o acute withdrawal. No infectious symptoms at this time. Had a low grade rectal temp 100.5 in ED, technically not meeting sepsis criteria. Was given ceftriazone 2000 mg X1.     Plan:   - f/u AM CBC  - would hold off on further Ab for now 2.8 on admission, repleted IV. Unable to tolerate PO 2/2 nausea. 2.8 repeat, IV repletion.     Plan:   - Replete aggressively  - f/u repeat BMP 2.8 on admission, repleted IV. Unable to tolerate PO 2/2 nausea. 2.8 repeat, IV repletion. Nausea resolved so will replete PO.     Plan:   - Replete aggressively  - f/u repeat BMP

## 2023-01-02 NOTE — PROGRESS NOTE ADULT - PROBLEM SELECTOR PLAN 4
Lactate elevated to 3.0 + elevated CK, Cannot rule out seizures though patient declines seizures in past and declines periods of unconsciousness or inability to recall. Infectious source unlikely, UA contaminated and patient asymptomatic. No respiratory symptoms, BM's regular. Fluid resuscitated w/ 3 L NS in ED.     Plan:   - ativan for seizures per CIWA protocol   - ntd at this time   - monitor infectious symptoms but low on ddx Lactate elevated to 3.0 + elevated CK, Cannot rule out seizures though patient declines seizures in past and declines periods of unconsciousness or inability to recall. No tongue bite. Infectious source unlikely, UA contaminated and patient asymptomatic. No respiratory symptoms, BM's regular. Fluid resuscitated w/ 3 L NS in ED.     Plan:   - ativan for seizures per CIWA protocol   - continue to monitor   - monitor infectious symptoms but low on ddx

## 2023-01-02 NOTE — PROGRESS NOTE ADULT - PROBLEM SELECTOR PLAN 1
Patient has been drinking for 3 weeks, was previously in remission and attending AA meetings. Endorses 12 beers per day, last drink was : 8 pm New Years Allegra. Alcohol level on admission: 216. AST/ALT: 148/96. Received Ativan and Valium in ED instead of Librium as he was nauseous and not tolerating PO. At present nausea is resolved.     Plan:    - CIWA protocol   - Ativan 2mg IV push for CIWA >8   - Librium 50 mg q8 -> q12hr  - Thiamine 300 mg q24 X 3 days   - folic acid 1 g   - fall precautions   - seizure precautions

## 2023-01-02 NOTE — PROGRESS NOTE ADULT - ASSESSMENT
Patient is a 42 year old male with PMHx of alcohol use disorder, who presented with symptoms of alcohol withdrawal including tremulousness and elevated heart rate, admitted for acute alcohol withdrawal and sepsis.

## 2023-01-02 NOTE — PATIENT PROFILE ADULT - FALL HARM RISK - HARM RISK INTERVENTIONS
Assistance with ambulation/Assistance OOB with selected safe patient handling equipment/Communicate Risk of Fall with Harm to all staff/Monitor for mental status changes/Monitor gait and stability/Reinforce activity limits and safety measures with patient and family/Tailored Fall Risk Interventions/Toileting schedule using arm’s reach rule for commode and bathroom/Use of alarms - bed, chair and/or voice tab/Visual Cue: Yellow wristband and red socks/Bed in lowest position, wheels locked, appropriate side rails in place/Call bell, personal items and telephone in reach/Instruct patient to call for assistance before getting out of bed or chair/Non-slip footwear when patient is out of bed/[place] to call system/Physically safe environment - no spills, clutter or unnecessary equipment/Purposeful Proactive Rounding/Room/bathroom lighting operational, light cord in reach

## 2023-01-03 VITALS — HEART RATE: 168 BPM

## 2023-01-03 LAB
ALBUMIN SERPL ELPH-MCNC: 3.6 G/DL — SIGNIFICANT CHANGE UP (ref 3.3–5)
ALP SERPL-CCNC: 103 U/L — SIGNIFICANT CHANGE UP (ref 40–120)
ALT FLD-CCNC: 58 U/L — HIGH (ref 10–45)
ANION GAP SERPL CALC-SCNC: 17 MMOL/L — SIGNIFICANT CHANGE UP (ref 5–17)
AST SERPL-CCNC: 94 U/L — HIGH (ref 10–40)
BILIRUB SERPL-MCNC: 0.9 MG/DL — SIGNIFICANT CHANGE UP (ref 0.2–1.2)
BUN SERPL-MCNC: 9 MG/DL — SIGNIFICANT CHANGE UP (ref 7–23)
CALCIUM SERPL-MCNC: 8.4 MG/DL — SIGNIFICANT CHANGE UP (ref 8.4–10.5)
CHLORIDE SERPL-SCNC: 96 MMOL/L — SIGNIFICANT CHANGE UP (ref 96–108)
CO2 SERPL-SCNC: 21 MMOL/L — LOW (ref 22–31)
CREAT SERPL-MCNC: 0.61 MG/DL — SIGNIFICANT CHANGE UP (ref 0.5–1.3)
EGFR: 124 ML/MIN/1.73M2 — SIGNIFICANT CHANGE UP
GLUCOSE SERPL-MCNC: 76 MG/DL — SIGNIFICANT CHANGE UP (ref 70–99)
HCT VFR BLD CALC: 42 % — SIGNIFICANT CHANGE UP (ref 39–50)
HGB BLD-MCNC: 13.7 G/DL — SIGNIFICANT CHANGE UP (ref 13–17)
MAGNESIUM SERPL-MCNC: 2.1 MG/DL — SIGNIFICANT CHANGE UP (ref 1.6–2.6)
MCHC RBC-ENTMCNC: 27 PG — SIGNIFICANT CHANGE UP (ref 27–34)
MCHC RBC-ENTMCNC: 32.6 GM/DL — SIGNIFICANT CHANGE UP (ref 32–36)
MCV RBC AUTO: 82.8 FL — SIGNIFICANT CHANGE UP (ref 80–100)
NRBC # BLD: 0 /100 WBCS — SIGNIFICANT CHANGE UP (ref 0–0)
PHOSPHATE SERPL-MCNC: 1.9 MG/DL — LOW (ref 2.5–4.5)
PLATELET # BLD AUTO: 126 K/UL — LOW (ref 150–400)
POTASSIUM SERPL-MCNC: 3.3 MMOL/L — LOW (ref 3.5–5.3)
POTASSIUM SERPL-SCNC: 3.3 MMOL/L — LOW (ref 3.5–5.3)
PROT SERPL-MCNC: 7.6 G/DL — SIGNIFICANT CHANGE UP (ref 6–8.3)
RBC # BLD: 5.07 M/UL — SIGNIFICANT CHANGE UP (ref 4.2–5.8)
RBC # FLD: 14.4 % — SIGNIFICANT CHANGE UP (ref 10.3–14.5)
SODIUM SERPL-SCNC: 134 MMOL/L — LOW (ref 135–145)
WBC # BLD: 9.08 K/UL — SIGNIFICANT CHANGE UP (ref 3.8–10.5)
WBC # FLD AUTO: 9.08 K/UL — SIGNIFICANT CHANGE UP (ref 3.8–10.5)

## 2023-01-03 PROCEDURE — 99285 EMERGENCY DEPT VISIT HI MDM: CPT | Mod: 25

## 2023-01-03 PROCEDURE — 84484 ASSAY OF TROPONIN QUANT: CPT

## 2023-01-03 PROCEDURE — 96365 THER/PROPH/DIAG IV INF INIT: CPT

## 2023-01-03 PROCEDURE — 71045 X-RAY EXAM CHEST 1 VIEW: CPT

## 2023-01-03 PROCEDURE — 82550 ASSAY OF CK (CPK): CPT

## 2023-01-03 PROCEDURE — 36415 COLL VENOUS BLD VENIPUNCTURE: CPT

## 2023-01-03 PROCEDURE — 96376 TX/PRO/DX INJ SAME DRUG ADON: CPT

## 2023-01-03 PROCEDURE — 96375 TX/PRO/DX INJ NEW DRUG ADDON: CPT

## 2023-01-03 PROCEDURE — 82962 GLUCOSE BLOOD TEST: CPT

## 2023-01-03 PROCEDURE — 80048 BASIC METABOLIC PNL TOTAL CA: CPT

## 2023-01-03 PROCEDURE — 83605 ASSAY OF LACTIC ACID: CPT

## 2023-01-03 PROCEDURE — 97166 OT EVAL MOD COMPLEX 45 MIN: CPT

## 2023-01-03 PROCEDURE — 87637 SARSCOV2&INF A&B&RSV AMP PRB: CPT

## 2023-01-03 PROCEDURE — 81001 URINALYSIS AUTO W/SCOPE: CPT

## 2023-01-03 PROCEDURE — 85025 COMPLETE CBC W/AUTO DIFF WBC: CPT

## 2023-01-03 PROCEDURE — 97162 PT EVAL MOD COMPLEX 30 MIN: CPT

## 2023-01-03 PROCEDURE — 80076 HEPATIC FUNCTION PANEL: CPT

## 2023-01-03 PROCEDURE — 83735 ASSAY OF MAGNESIUM: CPT

## 2023-01-03 PROCEDURE — 85027 COMPLETE CBC AUTOMATED: CPT

## 2023-01-03 PROCEDURE — 80307 DRUG TEST PRSMV CHEM ANLYZR: CPT

## 2023-01-03 PROCEDURE — 84100 ASSAY OF PHOSPHORUS: CPT

## 2023-01-03 PROCEDURE — 87040 BLOOD CULTURE FOR BACTERIA: CPT

## 2023-01-03 PROCEDURE — 80053 COMPREHEN METABOLIC PANEL: CPT

## 2023-01-03 RX ADMIN — Medication 300 MILLIGRAM(S): at 10:20

## 2023-01-03 RX ADMIN — Medication 50 MILLIGRAM(S): at 05:41

## 2023-01-03 NOTE — PROVIDER CONTACT NOTE (OTHER) - ASSESSMENT
Pt very anxious. CIWA 3 for mild tremors and anxiety. Latest vitals were at 10:50am /97. Spo2 97%, . Pt very anxious. CIWA 3 for mild tremors and anxiety. Latest vitals were at 10:50am /97. Spo2 97%, . Pt's father stated pt no longer has IV access or telemetry monitoring on at home.

## 2023-01-03 NOTE — PROVIDER CONTACT NOTE (OTHER) - BACKGROUND
Hx of alcohol abuse in the past. Lives with parents at home. Recent elopement from [facility] d/t alcohol abuse.

## 2023-01-03 NOTE — PROVIDER CONTACT NOTE (OTHER) - ACTION/TREATMENT ORDERED:
Only a phone  and a shirt found at bedside. Pt's father notified of situation. Pt left hospital in a taxi w/o speaking to RN. Father notified to bring pt back in CIWA s/s worsen.

## 2023-01-03 NOTE — OCCUPATIONAL THERAPY INITIAL EVALUATION ADULT - DIAGNOSIS, OT EVAL
Pt presents with BUE tremors, decreased functional endurance, and decreased BUE coordination impacting his ability to independently complete ADLs, functional transfers, and functional mobility.

## 2023-01-03 NOTE — PHYSICAL THERAPY INITIAL EVALUATION ADULT - GAIT DEVIATIONS NOTED, PT EVAL
decreased brian/increased time in double stance/decreased step length/decreased weight-shifting ability

## 2023-01-03 NOTE — PHYSICAL THERAPY INITIAL EVALUATION ADULT - ADDITIONAL COMMENTS
pt lives w/ his family in a elevator access apt building w/ 1 step to enter. Denies use of DME for ambulation prior to this admission. Denies hx of recent falls. states that he was independent in all ADLs prior to this admission

## 2023-01-03 NOTE — OCCUPATIONAL THERAPY INITIAL EVALUATION ADULT - PLANNED THERAPY INTERVENTIONS, OT EVAL
ADL retraining/IADL retraining/balance training/fine motor coordination training/motor coordination training/strengthening/transfer training

## 2023-01-03 NOTE — OCCUPATIONAL THERAPY INITIAL EVALUATION ADULT - ADDITIONAL COMMENTS
Pt lives in a townhouse with 2STE with his parents. Pt reports that prior to admission, pt was independent in all ADLs and IADLs, and ambulates with no device. Pt is R hand dominant.

## 2023-01-03 NOTE — OCCUPATIONAL THERAPY INITIAL EVALUATION ADULT - PHYSICAL ASSIST/NONPHYSICAL ASSIST:DRESS LOWER BODY, OT EVAL
jaylyn/doff socks and shoes seated in bedside chair/supervision/verbal cues/nonverbal cues (demo/gestures)

## 2023-01-03 NOTE — PROVIDER CONTACT NOTE (OTHER) - SITUATION
Pt admitted for acute alcohol withdrawal and eloped facility. Pt admitted for acute alcohol withdrawal and eloped facility. Pt eloped with IV and portable telemetry monitor intact.

## 2023-01-03 NOTE — OCCUPATIONAL THERAPY INITIAL EVALUATION ADULT - MODIFIED CLINICAL TEST OF SENSORY INTEGRATION IN BALANCE TEST
Pt performed functional mobility to/from bedroom door x 2 with supervision and no device, no ataxia nor LOB noted.

## 2023-01-03 NOTE — OCCUPATIONAL THERAPY INITIAL EVALUATION ADULT - GENERAL OBSERVATIONS, REHAB EVAL
OT IE completed. Pt's ALEM Sanches cleared pt for therapy. Pt received seated in bedside chair, +tele, +heplock, NAD, agreeable to OT. PT Ashlee present. Pt anxious throughout session, tachycardic up to 150bpm and elevated BP (158/106 at the highest), ALEM Sanches and medical team made aware. Pt tolerated session fairly well. Pt left as found, all lines in tact, needs in reach, RN aware.

## 2023-01-03 NOTE — OCCUPATIONAL THERAPY INITIAL EVALUATION ADULT - MANUAL MUSCLE TESTING RESULTS, REHAB EVAL
BUE grossly 5/5 throughout, BLE grossly greater than or equal to 3+/5 based on functional assessment against gravity.

## 2023-01-03 NOTE — CHART NOTE - NSCHARTNOTEFT_GEN_A_CORE
Informed patient during AM rounds that we were planning to discharge him in the afternoon and would move his Librium dose earlier. Informed by RN around noon that patient had eloped prior to receiving the Librium or discharge instructions. Spoke with patient's father and confirmed that he made it home safely.

## 2023-01-05 NOTE — ED ADULT NURSE NOTE - ED CARDIAC RHYTHM
regular Hydroxychloroquine Counseling:  I discussed with the patient that a baseline ophthalmologic exam is needed at the start of therapy and every year thereafter while on therapy. A CBC may also be warranted for monitoring.  The side effects of this medication were discussed with the patient, including but not limited to agranulocytosis, aplastic anemia, seizures, rashes, retinopathy, and liver toxicity. Patient instructed to call the office should any adverse effect occur.  The patient verbalized understanding of the proper use and possible adverse effects of Plaquenil.  All the patient's questions and concerns were addressed.

## 2023-01-11 DIAGNOSIS — E87.6 HYPOKALEMIA: ICD-10-CM

## 2023-01-11 DIAGNOSIS — R65.20 SEVERE SEPSIS WITHOUT SEPTIC SHOCK: ICD-10-CM

## 2023-01-11 DIAGNOSIS — E87.20 ACIDOSIS, UNSPECIFIED: ICD-10-CM

## 2023-01-11 DIAGNOSIS — F10.230 ALCOHOL DEPENDENCE WITH WITHDRAWAL, UNCOMPLICATED: ICD-10-CM

## 2023-01-11 DIAGNOSIS — Z87.891 PERSONAL HISTORY OF NICOTINE DEPENDENCE: ICD-10-CM

## 2023-01-11 DIAGNOSIS — E83.51 HYPOCALCEMIA: ICD-10-CM

## 2023-01-11 DIAGNOSIS — J69.0 PNEUMONITIS DUE TO INHALATION OF FOOD AND VOMIT: ICD-10-CM

## 2023-01-11 DIAGNOSIS — A41.9 SEPSIS, UNSPECIFIED ORGANISM: ICD-10-CM

## 2023-01-16 NOTE — ED ADULT NURSE REASSESSMENT NOTE - CARDIO ASSESSMENT
Please call Radiology to schedule your Mammogram [phone]      GASTROENTEROLOGY [phone]    PHYSICAL THERAPY [phone]    [facility] Lab Hours  You may visit any of our locations for blood work. No appointment is needed.     [place] Phone [phone]  [place]  Phone [phone]   Mon - Thurs 6:30am - 7:30pm  Mon - Fri 7am - 5pm Fri 6:30am - 5:30pm  Saturday 7am - 12 noon   Saturday  6:30am - 12:00 noon             [place]        Phone [phone]  [place] Phone [phone]   Mon, Tues & Thurs  6:30am - 6:30pm  Mon 7am - 7pm   Wednesday 6:30am - 7pm  Tues - Fri 7am - 5pm Friday  6:30am - 5pm  Saturday 7am - 12 noon   Saturday  6:30am - 12 noon             [place]  Phone [phone]  [place] Phone [phone]   Mon, Tues, Wed & Fri 8am - 5pm  Mon - Fri 7am - 5:30pm   Thursday    8am - 7pm      Saturday 8am - 12 noon             [place]  Phone [phone]  [place] Phone [phone]   Mon, Thurs & Fri 7:30am - 4pm   Call for hours   Tuesday 7:30am - 7pm      Wednesday  7:30am - 12 noon  [place] Phone [phone]   Saturday  7:30am - 11am  Monday 7:30am - 6:pm      Tues - Fri 7:30am - 5pm    [place] Phone [phone]  Sat (Intermittent dates - please call to confirm) 8am - 12 noon   Monday   8am-7pm Closed 12 to 12:30      Tues & Thurs 8am-7pm  [place] [phone]   Wednesday 8am-5pm  Mon-Thurs 8am - 4:30pm Closed 12 to 1   Friday 8am-5pm Closed 12 to 12:30  Fri 8am - 12 noon   Saturday 8am-12 noon               General information when having a lab test:  Fasting - No caloric intake (WATER IS OKAY) for a minimum of 8-10 hours before your blood draw: NO black coffee and NO gum chewing (even sugar free)  Tests that commonly require fasting are:  Cholesterol (lipid panel)  Basic chemistry panel  Comprehensive chemistry panel  Glucose (blood sugar)        Medicine - You can take any prescribed or routine medicine during your fast.    Brushing Teeth - You can brush  --- your teeth even if you are fasting.    Getting Your Results - Your doctor’s office will notify you of your results within 10 working days.    Medicare payment for lab testing -  depending on what test is ordered, when you last had that test and the reason for having the test, Medicare may not pay.  The lab will try to determine if payment will be a problem before you have your blood drawn.  If it is determined that Medicare will not pay for the ordered test, we will ask you to sign a form indicating you will be responsible for any costs.

## 2023-05-22 NOTE — ED PROVIDER NOTE - DISCHARGE DATE
08-Mar-2020 Full Thickness Lip Wedge Repair (Flap) Text: Given the location of the defect and the proximity to free margins a full thickness wedge repair was deemed most appropriate.  Using a sterile surgical marker, the appropriate repair was drawn incorporating the defect and placing the expected incisions perpendicular to the vermilion border.  The vermilion border was also meticulously outlined to ensure appropriate reapproximation during the repair.  The area thus outlined was incised through and through with a #15 scalpel blade.  The muscularis and dermis were reaproximated with deep sutures following hemostasis. Care was taken to realign the vermilion border before proceeding with the superficial closure.  Once the vermilion was realigned the superfical and mucosal closure was finished.

## 2023-08-16 NOTE — ED ADULT TRIAGE NOTE - PAIN RATING/NUMBER SCALE (0-10): ACTIVITY
Add 52 Modifier (Optional): no
Anesthesia Volume In Cc: 0.5
Detail Level: Simple
Treatment Number (Will Not Render If 0): 0
Medical Necessity Information: It is in your best interest to select a reason for this procedure from the list below. All of these items fulfill various CMS LCD requirements except the new and changing color options.
Post-Care Instructions: I reviewed with the patient in detail post-care instructions. Patient is to wear sunprotection, and avoid picking at any of the treated lesions. Pt may apply Vaseline to crusted or scabbing areas.
Consent: The patient's consent was obtained including but not limited to risks of crusting, scabbing, blistering, scarring, darker or lighter pigmentary change, recurrence, incomplete removal and infection.
Medical Necessity Clause: This procedure was medically necessary because the lesions that were treated were:
0

## 2024-03-17 ENCOUNTER — INPATIENT (INPATIENT)
Facility: HOSPITAL | Age: 44
LOS: 0 days | Discharge: LEFT BEFORE DISCHARGE | DRG: 894 | End: 2024-03-17
Attending: INTERNAL MEDICINE | Admitting: INTERNAL MEDICINE
Payer: COMMERCIAL

## 2024-03-17 VITALS
HEART RATE: 122 BPM | OXYGEN SATURATION: 95 % | SYSTOLIC BLOOD PRESSURE: 137 MMHG | DIASTOLIC BLOOD PRESSURE: 81 MMHG | RESPIRATION RATE: 18 BRPM

## 2024-03-17 VITALS
OXYGEN SATURATION: 96 % | RESPIRATION RATE: 18 BRPM | HEART RATE: 147 BPM | WEIGHT: 250 LBS | HEIGHT: 66 IN | TEMPERATURE: 98 F | SYSTOLIC BLOOD PRESSURE: 182 MMHG | DIASTOLIC BLOOD PRESSURE: 96 MMHG

## 2024-03-17 DIAGNOSIS — F10.239 ALCOHOL DEPENDENCE WITH WITHDRAWAL, UNSPECIFIED: ICD-10-CM

## 2024-03-17 DIAGNOSIS — E87.6 HYPOKALEMIA: ICD-10-CM

## 2024-03-17 DIAGNOSIS — Z29.9 ENCOUNTER FOR PROPHYLACTIC MEASURES, UNSPECIFIED: ICD-10-CM

## 2024-03-17 LAB
ALBUMIN SERPL ELPH-MCNC: 3.4 G/DL — SIGNIFICANT CHANGE UP (ref 3.4–5)
ALP SERPL-CCNC: 130 U/L — HIGH (ref 40–120)
ALT FLD-CCNC: 171 U/L — HIGH (ref 12–42)
ANION GAP SERPL CALC-SCNC: 16 MMOL/L — SIGNIFICANT CHANGE UP (ref 9–16)
APTT BLD: 28.6 SEC — SIGNIFICANT CHANGE UP (ref 24.5–35.6)
AST SERPL-CCNC: 400 U/L — HIGH (ref 15–37)
BASOPHILS # BLD AUTO: 0.03 K/UL — SIGNIFICANT CHANGE UP (ref 0–0.2)
BASOPHILS NFR BLD AUTO: 0.2 % — SIGNIFICANT CHANGE UP (ref 0–2)
BILIRUB SERPL-MCNC: 0.7 MG/DL — SIGNIFICANT CHANGE UP (ref 0.2–1.2)
BUN SERPL-MCNC: 11 MG/DL — SIGNIFICANT CHANGE UP (ref 7–23)
CALCIUM SERPL-MCNC: 7.7 MG/DL — LOW (ref 8.5–10.5)
CHLORIDE SERPL-SCNC: 95 MMOL/L — LOW (ref 96–108)
CO2 SERPL-SCNC: 28 MMOL/L — SIGNIFICANT CHANGE UP (ref 22–31)
CREAT SERPL-MCNC: 1.06 MG/DL — SIGNIFICANT CHANGE UP (ref 0.5–1.3)
EGFR: 90 ML/MIN/1.73M2 — SIGNIFICANT CHANGE UP
EOSINOPHIL # BLD AUTO: 0 K/UL — SIGNIFICANT CHANGE UP (ref 0–0.5)
EOSINOPHIL NFR BLD AUTO: 0 % — SIGNIFICANT CHANGE UP (ref 0–6)
GLUCOSE SERPL-MCNC: 171 MG/DL — HIGH (ref 70–99)
HCT VFR BLD CALC: 46.3 % — SIGNIFICANT CHANGE UP (ref 39–50)
HGB BLD-MCNC: 15.6 G/DL — SIGNIFICANT CHANGE UP (ref 13–17)
IMM GRANULOCYTES NFR BLD AUTO: 0.4 % — SIGNIFICANT CHANGE UP (ref 0–0.9)
INR BLD: 1.08 — SIGNIFICANT CHANGE UP (ref 0.85–1.18)
LACTATE BLDV-MCNC: 3.7 MMOL/L — HIGH (ref 0.5–2)
LACTATE BLDV-MCNC: 5.4 MMOL/L — CRITICAL HIGH (ref 0.5–2)
LIDOCAIN IGE QN: 39 U/L — SIGNIFICANT CHANGE UP (ref 16–77)
LYMPHOCYTES # BLD AUTO: 35.2 % — SIGNIFICANT CHANGE UP (ref 13–44)
LYMPHOCYTES # BLD AUTO: 4.56 K/UL — HIGH (ref 1–3.3)
MAGNESIUM SERPL-MCNC: 2.1 MG/DL — SIGNIFICANT CHANGE UP (ref 1.6–2.6)
MCHC RBC-ENTMCNC: 27 PG — SIGNIFICANT CHANGE UP (ref 27–34)
MCHC RBC-ENTMCNC: 33.7 GM/DL — SIGNIFICANT CHANGE UP (ref 32–36)
MCV RBC AUTO: 80.1 FL — SIGNIFICANT CHANGE UP (ref 80–100)
MONOCYTES # BLD AUTO: 0.68 K/UL — SIGNIFICANT CHANGE UP (ref 0–0.9)
MONOCYTES NFR BLD AUTO: 5.2 % — SIGNIFICANT CHANGE UP (ref 2–14)
NEUTROPHILS # BLD AUTO: 7.65 K/UL — HIGH (ref 1.8–7.4)
NEUTROPHILS NFR BLD AUTO: 59 % — SIGNIFICANT CHANGE UP (ref 43–77)
NRBC # BLD: 0 /100 WBCS — SIGNIFICANT CHANGE UP (ref 0–0)
PCO2 BLDV: 34 MMHG — LOW (ref 42–55)
PH BLDV: 7.57 — HIGH (ref 7.32–7.43)
PLATELET # BLD AUTO: 303 K/UL — SIGNIFICANT CHANGE UP (ref 150–400)
PO2 BLDV: 90 MMHG — HIGH (ref 25–45)
POTASSIUM SERPL-MCNC: 2.8 MMOL/L — CRITICAL LOW (ref 3.5–5.3)
POTASSIUM SERPL-SCNC: 2.8 MMOL/L — CRITICAL LOW (ref 3.5–5.3)
PROT SERPL-MCNC: 8 G/DL — SIGNIFICANT CHANGE UP (ref 6.4–8.2)
PROTHROM AB SERPL-ACNC: 12.2 SEC — SIGNIFICANT CHANGE UP (ref 9.5–13)
RBC # BLD: 5.78 M/UL — SIGNIFICANT CHANGE UP (ref 4.2–5.8)
RBC # FLD: 13.9 % — SIGNIFICANT CHANGE UP (ref 10.3–14.5)
SAO2 % BLDV: 98.1 % — HIGH (ref 67–88)
SODIUM SERPL-SCNC: 139 MMOL/L — SIGNIFICANT CHANGE UP (ref 132–145)
TROPONIN I, HIGH SENSITIVITY RESULT: 52.5 NG/L — SIGNIFICANT CHANGE UP
WBC # BLD: 12.97 K/UL — HIGH (ref 3.8–10.5)
WBC # FLD AUTO: 12.97 K/UL — HIGH (ref 3.8–10.5)

## 2024-03-17 PROCEDURE — 85610 PROTHROMBIN TIME: CPT

## 2024-03-17 PROCEDURE — 85730 THROMBOPLASTIN TIME PARTIAL: CPT

## 2024-03-17 PROCEDURE — 83735 ASSAY OF MAGNESIUM: CPT

## 2024-03-17 PROCEDURE — 99223 1ST HOSP IP/OBS HIGH 75: CPT | Mod: GC

## 2024-03-17 PROCEDURE — 83690 ASSAY OF LIPASE: CPT

## 2024-03-17 PROCEDURE — 99285 EMERGENCY DEPT VISIT HI MDM: CPT

## 2024-03-17 PROCEDURE — 80053 COMPREHEN METABOLIC PANEL: CPT

## 2024-03-17 PROCEDURE — 36415 COLL VENOUS BLD VENIPUNCTURE: CPT

## 2024-03-17 PROCEDURE — 83605 ASSAY OF LACTIC ACID: CPT

## 2024-03-17 PROCEDURE — 85025 COMPLETE CBC W/AUTO DIFF WBC: CPT

## 2024-03-17 PROCEDURE — 82803 BLOOD GASES ANY COMBINATION: CPT

## 2024-03-17 PROCEDURE — 84484 ASSAY OF TROPONIN QUANT: CPT

## 2024-03-17 RX ORDER — POTASSIUM CHLORIDE 20 MEQ
10 PACKET (EA) ORAL
Refills: 0 | Status: DISCONTINUED | OUTPATIENT
Start: 2024-03-17 | End: 2024-03-17

## 2024-03-17 RX ORDER — DIAZEPAM 5 MG
20 TABLET ORAL ONCE
Refills: 0 | Status: DISCONTINUED | OUTPATIENT
Start: 2024-03-17 | End: 2024-03-17

## 2024-03-17 RX ORDER — POTASSIUM CHLORIDE 20 MEQ
10 PACKET (EA) ORAL
Refills: 0 | Status: COMPLETED | OUTPATIENT
Start: 2024-03-17 | End: 2024-03-17

## 2024-03-17 RX ORDER — DIAZEPAM 5 MG
10 TABLET ORAL ONCE
Refills: 0 | Status: DISCONTINUED | OUTPATIENT
Start: 2024-03-17 | End: 2024-03-17

## 2024-03-17 RX ORDER — SODIUM CHLORIDE 9 MG/ML
2000 INJECTION INTRAMUSCULAR; INTRAVENOUS; SUBCUTANEOUS ONCE
Refills: 0 | Status: COMPLETED | OUTPATIENT
Start: 2024-03-17 | End: 2024-03-17

## 2024-03-17 RX ORDER — POTASSIUM CHLORIDE 20 MEQ
40 PACKET (EA) ORAL ONCE
Refills: 0 | Status: COMPLETED | OUTPATIENT
Start: 2024-03-17 | End: 2024-03-17

## 2024-03-17 RX ORDER — PHENOBARBITAL 60 MG
130 TABLET ORAL ONCE
Refills: 0 | Status: DISCONTINUED | OUTPATIENT
Start: 2024-03-17 | End: 2024-03-17

## 2024-03-17 RX ADMIN — Medication 10 MILLIGRAM(S): at 09:15

## 2024-03-17 RX ADMIN — Medication 100 MILLIEQUIVALENT(S): at 11:05

## 2024-03-17 RX ADMIN — Medication 40 MILLIEQUIVALENT(S): at 11:04

## 2024-03-17 RX ADMIN — Medication 100 MILLIEQUIVALENT(S): at 13:27

## 2024-03-17 RX ADMIN — Medication 130 MILLIGRAM(S): at 10:48

## 2024-03-17 RX ADMIN — Medication 100 MILLIEQUIVALENT(S): at 08:34

## 2024-03-17 RX ADMIN — Medication 130 MILLIGRAM(S): at 12:32

## 2024-03-17 RX ADMIN — Medication 100 MILLIEQUIVALENT(S): at 12:05

## 2024-03-17 RX ADMIN — Medication 10 MILLIGRAM(S): at 08:42

## 2024-03-17 RX ADMIN — Medication 10 MILLIGRAM(S): at 06:40

## 2024-03-17 RX ADMIN — Medication 10 MILLIGRAM(S): at 06:59

## 2024-03-17 RX ADMIN — SODIUM CHLORIDE 2000 MILLILITER(S): 9 INJECTION INTRAMUSCULAR; INTRAVENOUS; SUBCUTANEOUS at 07:43

## 2024-03-17 RX ADMIN — Medication 20 MILLIGRAM(S): at 07:43

## 2024-03-17 NOTE — ED ADULT NURSE REASSESSMENT NOTE - NS ED NURSE REASSESS COMMENT FT1
Pt given IV valium 20 mg IVP verbal with read back for increasing agitation and confusion. order placed by ASHUTOSH olmos

## 2024-03-17 NOTE — ED PROVIDER NOTE - OBJECTIVE STATEMENT
43-year-old male, past medical history of alcohol use disorder, presenting to the emergency room complaining of acute withdrawal symptoms.  Patient states he has been on a 9-day drinking binge.  He estimates about 3 L of vodka each day that he was drinking.  Patient has a known history of alcohol withdrawal in the past.  He denies history of seizures secondary to alcohol withdrawal.  Patient endorses generalized anxiety and feeling tremulous.  Denies chest pain, abdominal pain, headaches or dizziness. 43-year-old male, past medical history of alcohol use disorder, presenting to the emergency room complaining of acute withdrawal symptoms.  Patient states he has been on a 9-day drinking binge.  He estimates about 3 L of vodka each day that he was drinking. Last drink ~15hrs ago. Patient has a known history of alcohol withdrawal in the past.  He denies history of seizures secondary to alcohol withdrawal.  Patient endorses generalized anxiety and feeling tremulous.  Denies chest pain, abdominal pain, headaches or dizziness.

## 2024-03-17 NOTE — H&P ADULT - NSHPLABSRESULTS_GEN_ALL_CORE
LABS:                          15.6   12.97 )-----------( 303      ( 17 Mar 2024 06:23 )             46.3     03-17    139  |  95<L>  |  11  ----------------------------<  171<H>  2.8<LL>   |  28  |  1.06    Ca    7.7<L>      17 Mar 2024 06:23  Mg     2.1     03-17    TPro  8.0  /  Alb  3.4  /  TBili  0.7  /  DBili  x   /  AST  400<H>  /  ALT  171<H>  /  AlkPhos  130<H>  03-17    PT/INR - ( 17 Mar 2024 06:24 )   PT: 12.2 sec;   INR: 1.08          PTT - ( 17 Mar 2024 06:24 )  PTT:28.6 sec    Albumin: 3.4 g/dL (03-17-24 @ 06:23)

## 2024-03-17 NOTE — ED PROVIDER NOTE - PHYSICAL EXAMINATION
VITAL SIGNS: I have reviewed nursing notes and confirm.  CONSTITUTIONAL: Well-developed; well-nourished; in no acute distress.  SKIN: Skin is warm and dry, no acute rash.  HEAD: Normocephalic; atraumatic.  NECK: Supple; non tender.  CARD: S1, S2 normal; no murmurs, gallops, or rubs. Tachy rate and rhythm.  RESP: No wheezes, rales or rhonchi.  ABD: Soft; non-distended; non-tender; no rebound or guarding.  EXT: Normal ROM. No clubbing, cyanosis or edema.  NEURO: Alert, oriented. Grossly unremarkable. PERAZA, normal tone, no gross motor or sensory changes. Fluent speech.   PSYCH: Cooperative, appropriate. Mood and affect wnl.

## 2024-03-17 NOTE — H&P ADULT - NSHPREVIEWOFSYSTEMS_GEN_ALL_CORE
REVIEW OF SYSTEMS:  CONSTITUTIONAL: + weakness, +malaise, + anxiety, - fevers or chills  EYES/ENT: No visual changes;  No vertigo or throat pain   NECK: No pain or stiffness  RESPIRATORY: - cough, wheezing, hemoptysis; - shortness of breath  CARDIOVASCULAR: - chest pain or palpitations  GASTROINTESTINAL: + nausea  GENITOURINARY: - dysuria, frequency or hematuria  NEUROLOGICAL: + tremors, + anxiety  SKIN: - itching, rashes

## 2024-03-17 NOTE — H&P ADULT - ASSESSMENT
43M, PMH of alcohol use disorder (multiple hosp for alcohol overuse/withdrawal, never been intubated) presents with tremors and malaise after binge-drinking.

## 2024-03-17 NOTE — ED ADULT NURSE REASSESSMENT NOTE - NS ED NURSE REASSESS COMMENT FT1
pt climbing out of stretcher agitated and states " I need to go home". unable to be redirected. Serenity BOYKIN at bedside with 3RNs. fall precautions maintained.

## 2024-03-17 NOTE — ED ADULT TRIAGE NOTE - CHIEF COMPLAINT QUOTE
Pt. walk in for ETOH withdrawal, reports that he was shaking before but not now. States that he feels like he is withdrawing and wants help before it gets worse. Pt. is not an everyday drinker, he relapsed and has been binge drinking for 5 days. Last drink yesterday morning 7am. Denies pain, n/v, hallucinations.

## 2024-03-17 NOTE — DISCHARGE NOTE PROVIDER - HOSPITAL COURSE
#Discharge: do not delete    43-year-old male, past medical history of alcohol use disorder, presenting to the emergency room complaining of acute withdrawal symptoms.    Hospital course (by problem):   #alcohol withdrawal  P/w alcohol withdrawal, treated with valium 60 IV in ED. Given 130mg phenobarb x2 in med tele.  - patient left against medical advice (see chart note)    Patient was discharged to: left against medical advice

## 2024-03-17 NOTE — ED ADULT NURSE NOTE - OBJECTIVE STATEMENT
42y/o male c/o alcohol withdrawal. CIWA upon arrival is 11. pt remains on continuous cardiac monitoring and pulse ox. pt states he drank 12 pints of vodka over 6 days. bed alarm on.

## 2024-03-17 NOTE — H&P ADULT - PROBLEM SELECTOR PLAN 3
F: as needed fluid repletion  E: replete Mg < 2, K <4  N: NPO for now    DVT Prophylaxis: SCDs for now

## 2024-03-17 NOTE — PROVIDER CONTACT NOTE (EICU) - BACKGROUND
43 y M with ETOH misuse admitted to telemetry for ETOH withdrawal  after binge drinking with last ETOH intake on 3/16/24 AM. ( pt is AOX3 on my on camera evaluation- states feeling better since initial ER presentation. Denies ever having seizures during ETOH withdrawal in the past). In ER [facility] pt started on IVF, K repletion, benzodiazepines CIWA

## 2024-03-17 NOTE — H&P ADULT - PROBLEM SELECTOR PLAN 1
Known AUD. Repeat hx of withdrawal episodes requiring hospitalization. Patient now s/p >1wk binge drinking episode. Presented with tremor and malaise.     - s/p Diazepam 10mg IVP x 5 in [facility] ED  - c/w Phenobarbital PRN q1hr based on CIWA protocol  - fall precautions  - High dose IV thiamine  - f/u UTox

## 2024-03-17 NOTE — H&P ADULT - HISTORY OF PRESENT ILLNESS
HPI:  43M, PMH of alcohol use disorder (multiple hosp for alcohol overuse/withdrawal, never been intubated) presents with tremors and malaise after binge-drinking. Pt states last drink was yesterday. Pt reportedly had been on about 9 day binge after having attempted sobriety. Denies hx of known DTs.      In the ED  VS: T 98.4, , /96   , RR 18   , SpO2  96 % on RA  Labs: WBC 12.97, K 2.8, Cl 95, Ca 7.7, Albumin normal, Alk Phos 130, ,   VBG: pH 7.57, pCO2 34, pO2 90, Venous O2 sat 98.1%  Orders: Diazepam 10mg IVP x 5

## 2024-03-17 NOTE — ED PROVIDER NOTE - CLINICAL SUMMARY MEDICAL DECISION MAKING FREE TEXT BOX
43-year-old male, past medical history of alcohol use disorder, presenting to the emergency room complaining of acute withdrawal symptoms. Patient is noted to have a heart rate of 147 in triage with a blood pressure of 182/96, concerning for possible acute withdrawal.  Will plan to obtain baseline CIWA score, give IV fluids, send medical labs, and will give benzos as needed depending on CIWA score.  Will reassess and dispo pending medical workup.

## 2024-03-17 NOTE — ED ADULT NURSE NOTE - NSFALLRISKINTERV_ED_ALL_ED
Assistance OOB with selected safe patient handling equipment if applicable/Assistance with ambulation/Communicate fall risk and risk factors to all staff, patient, and family/Monitor gait and stability/Monitor for mental status changes and reorient to person, place, and time, as needed/Provide visual cue: yellow wristband, yellow gown, etc/Reinforce activity limits and safety measures with patient and family/Toileting schedule using arm’s reach rule for commode and bathroom/Use of alarms - bed, stretcher, chair and/or video monitoring/Call bell, personal items and telephone in reach/Instruct patient to call for assistance before getting out of bed/chair/stretcher/Non-slip footwear applied when patient is off stretcher/[place] to call system/Physically safe environment - no spills, clutter or unnecessary equipment/Purposeful Proactive Rounding/Room/bathroom lighting operational, light cord in reach

## 2024-03-17 NOTE — ED PROVIDER NOTE - NS ED ROS FT
+tremulousness; anxiety  Denies fevers, chills, nausea, vomiting, diarrhea, constipation, abdominal pain, urinary symptoms, chest pain, palpitations, shortness of breath, dyspnea on exertion, syncope/near syncope, cough/URI symptoms, headache, weakness, numbness, focal deficits, visual changes, gait or balance changes, dizziness

## 2024-03-17 NOTE — PATIENT PROFILE ADULT - NSPRESCRALCFREQ_GEN_A_NUR
Pt previously sober, but drank "two pints of vodka each day" for 3 days prior to admission/4 or more times a week

## 2024-03-17 NOTE — PATIENT PROFILE ADULT - FALL HARM RISK - HARM RISK INTERVENTIONS
Assistance with ambulation/Assistance OOB with selected safe patient handling equipment/Communicate Risk of Fall with Harm to all staff/Discuss with provider need for PT consult/Monitor for mental status changes/Monitor gait and stability/Provide patient with walking aids - walker, cane, crutches/Reinforce activity limits and safety measures with patient and family/Tailored Fall Risk Interventions/Toileting schedule using arm’s reach rule for commode and bathroom/Use of alarms - bed, chair and/or voice tab/Visual Cue: Yellow wristband and red socks/Bed in lowest position, wheels locked, appropriate side rails in place/Call bell, personal items and telephone in reach/Instruct patient to call for assistance before getting out of bed or chair/Non-slip footwear when patient is out of bed/[place] to call system/Physically safe environment - no spills, clutter or unnecessary equipment/Purposeful Proactive Rounding/Room/bathroom lighting operational, light cord in reach

## 2024-03-17 NOTE — PROVIDER CONTACT NOTE (EICU) - SITUATION
[facility] was activated by [facility] RN/ MD  to confirm pt's readiness for transfer to [facility] telemetry/ stepdown bed.  Current vitals T 98.4, SpO2 94-96 on 4 L NC. /90( down from 182/96),  (down from 147) after IVF and benzos ( total 60 mg IV Diazepam since 6 AM in divided doses)  ABG 7.57/34/90, LA 5.4--> 3.7,  K 2.8 ( currently being supplemented IV), MG 2.1,  AST//171,  Lipase 39,  , H/H 15.6/46.3, WBC 12.97, CXR - no infiltrates

## 2024-03-17 NOTE — CHART NOTE - NSCHARTNOTEFT_GEN_A_CORE
Called into room to speak to patient who expresses that he would like to leave against medical advice.    Pt expresses that he prefers to detox at home. We explained to patient that he is at very high risk of having serious symptoms of alcohol withdrawal including seizure and death.    Pt plans to take a cab downtown where he lives with his parents. He plans to detox there.   Pt is mentating normally and able to reiterate the risks of the leaving the hospital during alcohol withdrawal including the risk of death.

## 2024-03-18 ENCOUNTER — EMERGENCY (EMERGENCY)
Facility: HOSPITAL | Age: 44
LOS: 1 days | Discharge: LEFT BEFORE DISCHARGE | End: 2024-03-18
Attending: EMERGENCY MEDICINE | Admitting: EMERGENCY MEDICINE
Payer: COMMERCIAL

## 2024-03-18 VITALS
SYSTOLIC BLOOD PRESSURE: 167 MMHG | TEMPERATURE: 99 F | OXYGEN SATURATION: 94 % | RESPIRATION RATE: 18 BRPM | HEART RATE: 139 BPM | DIASTOLIC BLOOD PRESSURE: 100 MMHG | HEIGHT: 66 IN | WEIGHT: 250 LBS

## 2024-03-18 PROCEDURE — 99284 EMERGENCY DEPT VISIT MOD MDM: CPT

## 2024-03-18 NOTE — ED ADULT TRIAGE NOTE - CHIEF COMPLAINT QUOTE
Pt walks in c/o alcohol withdrawal, states last drink was 2 days ago. Pt c/o body aches, tremors, and possible visual hallucinations. Pt noted to have white substance on lips and hands, states it is from his "eCig lozenges."

## 2024-03-18 NOTE — ED PROVIDER NOTE - ENDOCRINE NEGATIVE STATEMENT, MLM
Dermatology Rooming Note    Debbi Grossman's goals for this visit include:   Chief Complaint   Patient presents with    Derm Problem     LATA Caceres, EMT-B     no diabetes and no thyroid trouble.

## 2024-03-18 NOTE — ED PROVIDER NOTE - PATIENT PORTAL LINK FT
You can access the FollowMyHealth Patient Portal offered by [facility] by registering at the following website: [email or web address]. By joining [employer]’s FollowMyHealth portal, you will also be able to view your health information using other applications (apps) compatible with our system.

## 2024-03-18 NOTE — ED PROVIDER NOTE - PHYSICAL EXAMINATION
Physical Exam  GEN: Awake, alert, non-toxic appearing, NCAT  EYES: PERRL, full EOMI,  ENT: External inspection normal, normal voice, no oropharyngeal ulcerations/lesions/swelling  HEAD: atraumatic  NECK: FROM neck, supple, no meningismus, trachea midline, no JVD  CV: tachycardia, no edema  RESP: cta bl, no tachypnea, no hypoxia, no resp distress,  GI: +BS, Soft, nontender, no guarding/rebound,   MSK: FROM all 4 extremities, N/V intact,   SKIN: Color normal for race, warm and dry, no rash  NEURO: Oriented x3, CN 2-12 grossly intact, normal motor, normal sensory

## 2024-03-18 NOTE — ED ADULT NURSE NOTE - EXPLANATION OF AMA FORM SIGNATURE
pt eloping from department, security called overhead and providers spoke to pt at exit but pt left and refused to sign

## 2024-03-18 NOTE — ED PROVIDER NOTE - ATTENDING APP SHARED VISIT CONTRIBUTION OF CARE
Patient came in for alcohol withdrawal. Recently signed out AMA from [facility] for same. No fever, ha, cp, sob, ap, n/v/d, focal weakness, paresthesias, hallucinations, delusions.    Gen: Well-developed, well-nourished, NAD, VS as noted by nursing. HEENT: NCAT, mmm   Chest: tachycardic, regular, nl S1 and S2, no m/r/g. Resp: CTAB, no w/r/r  Abd: nl BS, soft, nt/nd. Ext: Warm, dry  Neuro: CN II-XII intact, normal and equal strength, sensation, and reflexes bilaterally, normal gait. Tremulous  Psych: AAOx3    MDM: During my evaluation, patient got up and started to walk out of ED. Patient stopped long enough to answer orientation questions. Awake, alert, oriented x3, gait steady, speech clear. Able to verbalize risk of leaving, including death. Patient stated he wanted to leave AMA, refused to wait to sign AMA paper.

## 2024-03-18 NOTE — ED PROVIDER NOTE - OBJECTIVE STATEMENT
43M, PMH of alcohol use disorder (multiple hosp for alcohol overuse/withdrawal AMA this morning from the Stepdown team at the [facility] site now here for withdrawal symptoms. Triage note appreciated, Patient is AOX3 denies hallucinations. Patient appears well NAD. Denies fever, chills, hematuria,  abdominal pain, change in bowel function, flank pain, rash, HA, dizziness, SOB, CP, palpitations, diaphoresis, cough, and malaise.

## 2024-03-18 NOTE — ED PROVIDER NOTE - CLINICAL SUMMARY MEDICAL DECISION MAKING FREE TEXT BOX
2
The patient wishes to leave against medical advice.  I have discussed the risks, benefits and alternatives (including the possibility of worsening of disease, pain, permanent disability, and/or death) with the patient and his/her family (if available).  The patient voices understanding of these risks, benefits, and alternatives and still wishes to sign out against medical advice.  The patient is awake, alert, oriented  x 3 and has demonstrated capacity to refuse/direct care.  I have advised the patient that they can and should return immediately should they develop any worse/different/additional symptoms, or if they change their mind and want to continue their care.    Patient here with withdrawal symptoms with [facility]MA this [facility]. Patient arrived for treatment of withdrawal symptoms. AOX3.   Patient AMA shortly after arrival and confirmed to be oriented to person,place, and time. Patient verbalized understanding of risks of leaving. Refused to sign AMA paperwork   Patient left ED after educational attempt with both providers attempting to redirect towards treatment.

## 2024-03-18 NOTE — ED ADULT NURSE REASSESSMENT NOTE - NS ED NURSE REASSESS COMMENT FT1
Assumed care of pt momentarily; pt immediately out of bed saying he wants to leave. Pt removed all monitors as nurse was placing. Pt reports visual hallucinations 4 hours ago none at present and no tremors with arms extended. Pt ambulating out of department and ASHUTOSH Barber and Dr. Albarran over to speak with pt at exit.

## 2024-03-21 DIAGNOSIS — F10.239 ALCOHOL DEPENDENCE WITH WITHDRAWAL, UNSPECIFIED: ICD-10-CM

## 2024-03-21 DIAGNOSIS — Z53.29 PROCEDURE AND TREATMENT NOT CARRIED OUT BECAUSE OF PATIENT'S DECISION FOR OTHER REASONS: ICD-10-CM

## 2024-03-22 DIAGNOSIS — F10.239 ALCOHOL DEPENDENCE WITH WITHDRAWAL, UNSPECIFIED: ICD-10-CM

## 2024-03-22 DIAGNOSIS — F41.9 ANXIETY DISORDER, UNSPECIFIED: ICD-10-CM

## 2024-03-22 DIAGNOSIS — E87.6 HYPOKALEMIA: ICD-10-CM

## 2024-09-14 NOTE — ED ADULT NURSE NOTE - HOW OFTEN DO YOU HAVE SIX OR MORE DRINKS ON ONE OCCASION?
Bed in low position, brakes on/Side rails x 2 or 4 up, assess large gaps, such that a patient could get extremity or other body part entrapped, use additional safety procedures/Use of non-skid footwear for ambulating patients, use of appropriate size clothing to prevent risk of tripping/Call light is within reach, educate patient/family on its functionality/Environment clear of unused equipment, furniture's in place, clear of hazards/Patient and family education available to parents and patient Four or more times a week

## 2024-12-16 NOTE — ED ADULT NURSE NOTE - CHPI ED NUR DURATION
12/16/2024    10:32 AM   Rapid3 Question Responses and Scores   MDHAQ Score 1.1   Psychologic Score 4.4   Pain Score 7.5   When you awakened in the morning OVER THE LAST WEEK, did you feel stiff? Yes   If Yes, please indicate the number of hours until you are as limber as you will be for the day 5   Fatigue Score 6   Global Health Score 6.5   RAPID3 Score 5.89         today

## 2025-02-10 NOTE — OCCUPATIONAL THERAPY INITIAL EVALUATION ADULT - LEVEL OF INDEPENDENCE: DRESS LOWER BODY, OT EVAL
supervision
Bed/Stretcher in lowest position, wheels locked, appropriate side rails in place/Call bell, personal items and telephone in reach/Instruct patient to call for assistance before getting out of bed/chair/stretcher/Non-slip footwear applied when patient is off stretcher/[place] to call system/Physically safe environment - no spills, clutter or unnecessary equipment/Purposeful proactive rounding/Room/bathroom lighting operational, light cord in reach

## 2025-05-07 ENCOUNTER — INPATIENT (INPATIENT)
Facility: HOSPITAL | Age: 45
LOS: 1 days | Discharge: HOME | DRG: 871 | End: 2025-05-09
Attending: INTERNAL MEDICINE | Admitting: INTERNAL MEDICINE
Payer: COMMERCIAL

## 2025-05-07 ENCOUNTER — RESULT REVIEW (OUTPATIENT)
Age: 45
End: 2025-05-07

## 2025-05-07 VITALS
DIASTOLIC BLOOD PRESSURE: 63 MMHG | OXYGEN SATURATION: 98 % | RESPIRATION RATE: 20 BRPM | TEMPERATURE: 99 F | SYSTOLIC BLOOD PRESSURE: 125 MMHG | HEART RATE: 135 BPM

## 2025-05-07 LAB
ADD ON TEST-SPECIMEN IN LAB: SIGNIFICANT CHANGE UP
ALBUMIN SERPL ELPH-MCNC: 2.8 G/DL — LOW (ref 3.4–5)
ALBUMIN SERPL ELPH-MCNC: 3.1 G/DL — LOW (ref 3.3–5)
ALBUMIN SERPL ELPH-MCNC: 3.3 G/DL — SIGNIFICANT CHANGE UP (ref 3.3–5)
ALP SERPL-CCNC: 55 U/L — SIGNIFICANT CHANGE UP (ref 40–120)
ALP SERPL-CCNC: 56 U/L — SIGNIFICANT CHANGE UP (ref 40–120)
ALP SERPL-CCNC: 58 U/L — SIGNIFICANT CHANGE UP (ref 40–120)
ALP SERPL-CCNC: 58 U/L — SIGNIFICANT CHANGE UP (ref 40–120)
ALP SERPL-CCNC: 66 U/L — SIGNIFICANT CHANGE UP (ref 40–120)
ALT FLD-CCNC: 102 U/L — HIGH (ref 10–45)
ALT FLD-CCNC: 108 U/L — HIGH (ref 10–45)
ALT FLD-CCNC: 108 U/L — HIGH (ref 10–45)
ALT FLD-CCNC: 123 U/L — HIGH (ref 10–45)
ALT FLD-CCNC: 151 U/L — HIGH (ref 12–42)
ANION GAP SERPL CALC-SCNC: 13 MMOL/L — SIGNIFICANT CHANGE UP (ref 5–17)
ANION GAP SERPL CALC-SCNC: 14 MMOL/L — SIGNIFICANT CHANGE UP (ref 5–17)
ANION GAP SERPL CALC-SCNC: 17 MMOL/L — SIGNIFICANT CHANGE UP (ref 5–17)
ANION GAP SERPL CALC-SCNC: 21 MMOL/L — HIGH (ref 5–17)
ANION GAP SERPL CALC-SCNC: 27 MMOL/L — HIGH (ref 9–16)
APPEARANCE UR: CLEAR — SIGNIFICANT CHANGE UP
APTT BLD: 22.7 SEC — LOW (ref 26.1–36.8)
AST SERPL-CCNC: 222 U/L — HIGH (ref 10–40)
AST SERPL-CCNC: 243 U/L — HIGH (ref 10–40)
AST SERPL-CCNC: 250 U/L — HIGH (ref 10–40)
AST SERPL-CCNC: 278 U/L — HIGH (ref 10–40)
AST SERPL-CCNC: 349 U/L — HIGH (ref 15–37)
B-OH-BUTYR SERPL-SCNC: 1.5 MMOL/L — HIGH
BASE EXCESS BLDA CALC-SCNC: -5.5 MMOL/L — LOW (ref -2–3)
BASOPHILS # BLD AUTO: 0 K/UL — SIGNIFICANT CHANGE UP (ref 0–0.2)
BASOPHILS # BLD AUTO: 0.03 K/UL — SIGNIFICANT CHANGE UP (ref 0–0.2)
BASOPHILS NFR BLD AUTO: 0 % — SIGNIFICANT CHANGE UP (ref 0–2)
BASOPHILS NFR BLD AUTO: 0.2 % — SIGNIFICANT CHANGE UP (ref 0–2)
BILIRUB SERPL-MCNC: 0.5 MG/DL — SIGNIFICANT CHANGE UP (ref 0.2–1.2)
BILIRUB SERPL-MCNC: 0.6 MG/DL — SIGNIFICANT CHANGE UP (ref 0.2–1.2)
BILIRUB SERPL-MCNC: 0.7 MG/DL — SIGNIFICANT CHANGE UP (ref 0.2–1.2)
BILIRUB SERPL-MCNC: 1.2 MG/DL — SIGNIFICANT CHANGE UP (ref 0.2–1.2)
BILIRUB SERPL-MCNC: 1.2 MG/DL — SIGNIFICANT CHANGE UP (ref 0.2–1.2)
BILIRUB UR-MCNC: NEGATIVE — SIGNIFICANT CHANGE UP
BLD GP AB SCN SERPL QL: NEGATIVE — SIGNIFICANT CHANGE UP
BUN SERPL-MCNC: 13 MG/DL — SIGNIFICANT CHANGE UP (ref 7–23)
BUN SERPL-MCNC: 14 MG/DL — SIGNIFICANT CHANGE UP (ref 7–23)
BUN SERPL-MCNC: 17 MG/DL — SIGNIFICANT CHANGE UP (ref 7–23)
BUN SERPL-MCNC: 17 MG/DL — SIGNIFICANT CHANGE UP (ref 7–23)
BUN SERPL-MCNC: 21 MG/DL — SIGNIFICANT CHANGE UP (ref 7–23)
CALCIUM SERPL-MCNC: 6.5 MG/DL — CRITICAL LOW (ref 8.5–10.5)
CALCIUM SERPL-MCNC: 6.6 MG/DL — LOW (ref 8.4–10.5)
CALCIUM SERPL-MCNC: 6.6 MG/DL — LOW (ref 8.4–10.5)
CALCIUM SERPL-MCNC: 6.8 MG/DL — LOW (ref 8.4–10.5)
CALCIUM SERPL-MCNC: 6.8 MG/DL — LOW (ref 8.4–10.5)
CHLORIDE SERPL-SCNC: 87 MMOL/L — LOW (ref 96–108)
CHLORIDE SERPL-SCNC: 88 MMOL/L — LOW (ref 96–108)
CHLORIDE SERPL-SCNC: 91 MMOL/L — LOW (ref 96–108)
CHLORIDE SERPL-SCNC: 91 MMOL/L — LOW (ref 96–108)
CHLORIDE SERPL-SCNC: 93 MMOL/L — LOW (ref 96–108)
CO2 BLDA-SCNC: 18 MMOL/L — LOW (ref 19–24)
CO2 SERPL-SCNC: 12 MMOL/L — LOW (ref 22–31)
CO2 SERPL-SCNC: 18 MMOL/L — LOW (ref 22–31)
CO2 SERPL-SCNC: 19 MMOL/L — LOW (ref 22–31)
CO2 SERPL-SCNC: 24 MMOL/L — SIGNIFICANT CHANGE UP (ref 22–31)
CO2 SERPL-SCNC: 25 MMOL/L — SIGNIFICANT CHANGE UP (ref 22–31)
COLOR SPEC: YELLOW — SIGNIFICANT CHANGE UP
CREAT ?TM UR-MCNC: 72 MG/DL — SIGNIFICANT CHANGE UP
CREAT SERPL-MCNC: 0.57 MG/DL — SIGNIFICANT CHANGE UP (ref 0.5–1.3)
CREAT SERPL-MCNC: 0.62 MG/DL — SIGNIFICANT CHANGE UP (ref 0.5–1.3)
CREAT SERPL-MCNC: 0.62 MG/DL — SIGNIFICANT CHANGE UP (ref 0.5–1.3)
CREAT SERPL-MCNC: 0.63 MG/DL — SIGNIFICANT CHANGE UP (ref 0.5–1.3)
CREAT SERPL-MCNC: 0.96 MG/DL — SIGNIFICANT CHANGE UP (ref 0.5–1.3)
DIFF PNL FLD: ABNORMAL
EGFR: 101 ML/MIN/1.73M2 — SIGNIFICANT CHANGE UP
EGFR: 101 ML/MIN/1.73M2 — SIGNIFICANT CHANGE UP
EGFR: 121 ML/MIN/1.73M2 — SIGNIFICANT CHANGE UP
EGFR: 121 ML/MIN/1.73M2 — SIGNIFICANT CHANGE UP
EGFR: 122 ML/MIN/1.73M2 — SIGNIFICANT CHANGE UP
EGFR: 125 ML/MIN/1.73M2 — SIGNIFICANT CHANGE UP
EGFR: 125 ML/MIN/1.73M2 — SIGNIFICANT CHANGE UP
EOSINOPHIL # BLD AUTO: 0 K/UL — SIGNIFICANT CHANGE UP (ref 0–0.5)
EOSINOPHIL # BLD AUTO: 0 K/UL — SIGNIFICANT CHANGE UP (ref 0–0.5)
EOSINOPHIL NFR BLD AUTO: 0 % — SIGNIFICANT CHANGE UP (ref 0–6)
EOSINOPHIL NFR BLD AUTO: 0 % — SIGNIFICANT CHANGE UP (ref 0–6)
ETHANOL SERPL-MCNC: 190 MG/DL — HIGH
ETHANOL SERPL-MCNC: <10 MG/DL — SIGNIFICANT CHANGE UP (ref 0–10)
GAS PNL BLDA: SIGNIFICANT CHANGE UP
GLUCOSE SERPL-MCNC: 105 MG/DL — HIGH (ref 70–99)
GLUCOSE SERPL-MCNC: 113 MG/DL — HIGH (ref 70–99)
GLUCOSE SERPL-MCNC: 122 MG/DL — HIGH (ref 70–99)
GLUCOSE SERPL-MCNC: 134 MG/DL — HIGH (ref 70–99)
GLUCOSE SERPL-MCNC: 177 MG/DL — HIGH (ref 70–99)
GLUCOSE UR QL: NEGATIVE MG/DL — SIGNIFICANT CHANGE UP
HAV IGM SER-ACNC: SIGNIFICANT CHANGE UP
HBV CORE IGM SER-ACNC: SIGNIFICANT CHANGE UP
HBV SURFACE AG SER-ACNC: SIGNIFICANT CHANGE UP
HCO3 BLDA-SCNC: 18 MMOL/L — LOW (ref 21–28)
HCT VFR BLD CALC: 17.7 % — CRITICAL LOW (ref 39–50)
HCT VFR BLD CALC: 20 % — CRITICAL LOW (ref 39–50)
HCT VFR BLD CALC: 22.1 % — LOW (ref 39–50)
HCT VFR BLD CALC: 22.8 % — LOW (ref 39–50)
HCT VFR BLD CALC: 24.5 % — LOW (ref 39–50)
HCV AB S/CO SERPL IA: 0.05 S/CO — SIGNIFICANT CHANGE UP
HCV AB SERPL-IMP: SIGNIFICANT CHANGE UP
HGB BLD-MCNC: 6.3 G/DL — CRITICAL LOW (ref 13–17)
HGB BLD-MCNC: 7.1 G/DL — LOW (ref 13–17)
HGB BLD-MCNC: 7.7 G/DL — LOW (ref 13–17)
HGB BLD-MCNC: 8.1 G/DL — LOW (ref 13–17)
HGB BLD-MCNC: 8.5 G/DL — LOW (ref 13–17)
IMM GRANULOCYTES # BLD AUTO: 0.18 K/UL — HIGH (ref 0–0.07)
IMM GRANULOCYTES NFR BLD AUTO: 1.1 % — HIGH (ref 0–0.9)
INR BLD: 1.06 — SIGNIFICANT CHANGE UP (ref 0.85–1.16)
KETONES UR-MCNC: 80 MG/DL
LACTATE SERPL-SCNC: 1.5 MMOL/L — SIGNIFICANT CHANGE UP (ref 0.5–2)
LACTATE SERPL-SCNC: 2.1 MMOL/L — HIGH (ref 0.5–2)
LACTATE SERPL-SCNC: 6.2 MMOL/L — CRITICAL HIGH (ref 0.5–2)
LEUKOCYTE ESTERASE UR-ACNC: NEGATIVE — SIGNIFICANT CHANGE UP
LYMPHOCYTES # BLD AUTO: 0.95 K/UL — LOW (ref 1–3.3)
LYMPHOCYTES # BLD AUTO: 1.66 K/UL — SIGNIFICANT CHANGE UP (ref 1–3.3)
LYMPHOCYTES # BLD AUTO: 12.5 % — LOW (ref 13–44)
LYMPHOCYTES NFR BLD AUTO: 5.9 % — LOW (ref 13–44)
MAGNESIUM SERPL-MCNC: 1.6 MG/DL — SIGNIFICANT CHANGE UP (ref 1.6–2.6)
MAGNESIUM SERPL-MCNC: 2 MG/DL — SIGNIFICANT CHANGE UP (ref 1.6–2.6)
MAGNESIUM SERPL-MCNC: 2.2 MG/DL — SIGNIFICANT CHANGE UP (ref 1.6–2.6)
MAGNESIUM SERPL-MCNC: 2.2 MG/DL — SIGNIFICANT CHANGE UP (ref 1.6–2.6)
MCHC RBC-ENTMCNC: 28.7 PG — SIGNIFICANT CHANGE UP (ref 27–34)
MCHC RBC-ENTMCNC: 29.2 PG — SIGNIFICANT CHANGE UP (ref 27–34)
MCHC RBC-ENTMCNC: 29.2 PG — SIGNIFICANT CHANGE UP (ref 27–34)
MCHC RBC-ENTMCNC: 29.7 PG — SIGNIFICANT CHANGE UP (ref 27–34)
MCHC RBC-ENTMCNC: 30.6 PG — SIGNIFICANT CHANGE UP (ref 27–34)
MCHC RBC-ENTMCNC: 34.7 G/DL — SIGNIFICANT CHANGE UP (ref 32–36)
MCHC RBC-ENTMCNC: 34.8 G/DL — SIGNIFICANT CHANGE UP (ref 32–36)
MCHC RBC-ENTMCNC: 35.5 G/DL — SIGNIFICANT CHANGE UP (ref 32–36)
MCHC RBC-ENTMCNC: 35.5 G/DL — SIGNIFICANT CHANGE UP (ref 32–36)
MCHC RBC-ENTMCNC: 35.6 G/DL — SIGNIFICANT CHANGE UP (ref 32–36)
MCV RBC AUTO: 81.9 FL — SIGNIFICANT CHANGE UP (ref 80–100)
MCV RBC AUTO: 82.3 FL — SIGNIFICANT CHANGE UP (ref 80–100)
MCV RBC AUTO: 82.5 FL — SIGNIFICANT CHANGE UP (ref 80–100)
MCV RBC AUTO: 85.7 FL — SIGNIFICANT CHANGE UP (ref 80–100)
MCV RBC AUTO: 86 FL — SIGNIFICANT CHANGE UP (ref 80–100)
MONOCYTES # BLD AUTO: 0.12 K/UL — SIGNIFICANT CHANGE UP (ref 0–0.9)
MONOCYTES # BLD AUTO: 0.81 K/UL — SIGNIFICANT CHANGE UP (ref 0–0.9)
MONOCYTES NFR BLD AUTO: 0.9 % — LOW (ref 2–14)
MONOCYTES NFR BLD AUTO: 5 % — SIGNIFICANT CHANGE UP (ref 2–14)
NEUTROPHILS # BLD AUTO: 11.53 K/UL — HIGH (ref 1.8–7.4)
NEUTROPHILS # BLD AUTO: 14.14 K/UL — HIGH (ref 1.8–7.4)
NEUTROPHILS NFR BLD AUTO: 86.6 % — HIGH (ref 43–77)
NEUTROPHILS NFR BLD AUTO: 87.8 % — HIGH (ref 43–77)
NITRITE UR-MCNC: NEGATIVE — SIGNIFICANT CHANGE UP
NRBC # BLD AUTO: 0.07 K/UL — HIGH (ref 0–0)
NRBC # FLD: 0.07 K/UL — HIGH (ref 0–0)
NRBC BLD AUTO-RTO: 0 /100 WBCS — SIGNIFICANT CHANGE UP (ref 0–0)
OSMOLALITY SERPL: 278 MOSM/KG — SIGNIFICANT CHANGE UP (ref 275–300)
PCO2 BLDA: 27 MMHG — LOW (ref 35–48)
PH BLDA: 7.42 — SIGNIFICANT CHANGE UP (ref 7.35–7.45)
PH UR: 6 — SIGNIFICANT CHANGE UP (ref 5–8)
PHENOBARB SERPL-MCNC: 14.8 UG/ML — LOW (ref 15–40)
PHENOBARB SERPL-MCNC: 20.6 UG/ML — SIGNIFICANT CHANGE UP (ref 15–40)
PHOSPHATE SERPL-MCNC: 1.9 MG/DL — LOW (ref 2.5–4.5)
PHOSPHATE SERPL-MCNC: 2.1 MG/DL — LOW (ref 2.5–4.5)
PHOSPHATE SERPL-MCNC: 2.6 MG/DL — SIGNIFICANT CHANGE UP (ref 2.5–4.5)
PHOSPHATE SERPL-MCNC: 3.1 MG/DL — SIGNIFICANT CHANGE UP (ref 2.5–4.5)
PLATELET # BLD AUTO: 119 K/UL — LOW (ref 150–400)
PLATELET # BLD AUTO: 119 K/UL — LOW (ref 150–400)
PLATELET # BLD AUTO: 127 K/UL — LOW (ref 150–400)
PLATELET # BLD AUTO: 145 K/UL — LOW (ref 150–400)
PLATELET # BLD AUTO: 179 K/UL — SIGNIFICANT CHANGE UP (ref 150–400)
PMV BLD: 9.7 FL — SIGNIFICANT CHANGE UP (ref 7–13)
PO2 BLDA: 114 MMHG — HIGH (ref 83–108)
POTASSIUM SERPL-MCNC: 3 MMOL/L — LOW (ref 3.5–5.3)
POTASSIUM SERPL-MCNC: 3.5 MMOL/L — SIGNIFICANT CHANGE UP (ref 3.5–5.3)
POTASSIUM SERPL-MCNC: 3.7 MMOL/L — SIGNIFICANT CHANGE UP (ref 3.5–5.3)
POTASSIUM SERPL-MCNC: 3.8 MMOL/L — SIGNIFICANT CHANGE UP (ref 3.5–5.3)
POTASSIUM SERPL-MCNC: 4.3 MMOL/L — SIGNIFICANT CHANGE UP (ref 3.5–5.3)
POTASSIUM SERPL-SCNC: 3 MMOL/L — LOW (ref 3.5–5.3)
POTASSIUM SERPL-SCNC: 3.5 MMOL/L — SIGNIFICANT CHANGE UP (ref 3.5–5.3)
POTASSIUM SERPL-SCNC: 3.7 MMOL/L — SIGNIFICANT CHANGE UP (ref 3.5–5.3)
POTASSIUM SERPL-SCNC: 3.8 MMOL/L — SIGNIFICANT CHANGE UP (ref 3.5–5.3)
POTASSIUM SERPL-SCNC: 4.3 MMOL/L — SIGNIFICANT CHANGE UP (ref 3.5–5.3)
POTASSIUM UR-SCNC: 40 MMOL/L — SIGNIFICANT CHANGE UP
PROT ?TM UR-MCNC: 17 MG/DL — HIGH (ref 0–12)
PROT SERPL-MCNC: 5.5 G/DL — LOW (ref 6–8.3)
PROT SERPL-MCNC: 5.7 G/DL — LOW (ref 6–8.3)
PROT SERPL-MCNC: 5.8 G/DL — LOW (ref 6–8.3)
PROT SERPL-MCNC: 6.1 G/DL — SIGNIFICANT CHANGE UP (ref 6–8.3)
PROT SERPL-MCNC: 6.4 G/DL — SIGNIFICANT CHANGE UP (ref 6.4–8.2)
PROT UR-MCNC: 30 MG/DL
PROT/CREAT UR-RTO: 0.2 RATIO — SIGNIFICANT CHANGE UP (ref 0–0.2)
PROTHROM AB SERPL-ACNC: 12.4 SEC — SIGNIFICANT CHANGE UP (ref 9.9–13.4)
RBC # BLD: 2.16 M/UL — LOW (ref 4.2–5.8)
RBC # BLD: 2.43 M/UL — LOW (ref 4.2–5.8)
RBC # BLD: 2.65 M/UL — LOW (ref 4.2–5.8)
RBC # BLD: 2.68 M/UL — LOW (ref 4.2–5.8)
RBC # BLD: 2.86 M/UL — LOW (ref 4.2–5.8)
RBC # FLD: 14.2 % — SIGNIFICANT CHANGE UP (ref 10.3–14.5)
RBC # FLD: 14.5 % — SIGNIFICANT CHANGE UP (ref 10.3–14.5)
RBC # FLD: 14.6 % — HIGH (ref 10.3–14.5)
RBC # FLD: 15.7 % — HIGH (ref 10.3–14.5)
RBC # FLD: 15.9 % — HIGH (ref 10.3–14.5)
RH IG SCN BLD-IMP: POSITIVE — SIGNIFICANT CHANGE UP
SAO2 % BLDA: 98.8 % — HIGH (ref 94–98)
SODIUM SERPL-SCNC: 126 MMOL/L — LOW (ref 132–145)
SODIUM SERPL-SCNC: 127 MMOL/L — LOW (ref 135–145)
SODIUM SERPL-SCNC: 127 MMOL/L — LOW (ref 135–145)
SODIUM SERPL-SCNC: 129 MMOL/L — LOW (ref 135–145)
SODIUM SERPL-SCNC: 131 MMOL/L — LOW (ref 135–145)
SODIUM UR-SCNC: 27 MMOL/L — SIGNIFICANT CHANGE UP
SP GR SPEC: 1.02 — SIGNIFICANT CHANGE UP (ref 1–1.03)
UROBILINOGEN FLD QL: 0.2 MG/DL — SIGNIFICANT CHANGE UP (ref 0.2–1)
UUN UR-MCNC: 630 MG/DL — SIGNIFICANT CHANGE UP
WBC # BLD: 13.31 K/UL — HIGH (ref 3.8–10.5)
WBC # BLD: 16.11 K/UL — HIGH (ref 3.8–10.5)
WBC # BLD: 7.29 K/UL — SIGNIFICANT CHANGE UP (ref 3.8–10.5)
WBC # BLD: 8.74 K/UL — SIGNIFICANT CHANGE UP (ref 3.8–10.5)
WBC # BLD: 9.77 K/UL — SIGNIFICANT CHANGE UP (ref 3.8–10.5)
WBC # FLD AUTO: 13.31 K/UL — HIGH (ref 3.8–10.5)
WBC # FLD AUTO: 16.11 K/UL — HIGH (ref 3.8–10.5)
WBC # FLD AUTO: 7.29 K/UL — SIGNIFICANT CHANGE UP (ref 3.8–10.5)
WBC # FLD AUTO: 8.74 K/UL — SIGNIFICANT CHANGE UP (ref 3.8–10.5)
WBC # FLD AUTO: 9.77 K/UL — SIGNIFICANT CHANGE UP (ref 3.8–10.5)

## 2025-05-07 PROCEDURE — 93306 TTE W/DOPPLER COMPLETE: CPT | Mod: 26

## 2025-05-07 PROCEDURE — 93010 ELECTROCARDIOGRAM REPORT: CPT

## 2025-05-07 PROCEDURE — 99222 1ST HOSP IP/OBS MODERATE 55: CPT | Mod: GC

## 2025-05-07 PROCEDURE — 99223 1ST HOSP IP/OBS HIGH 75: CPT | Mod: GC

## 2025-05-07 PROCEDURE — 76705 ECHO EXAM OF ABDOMEN: CPT | Mod: 26

## 2025-05-07 PROCEDURE — 99285 EMERGENCY DEPT VISIT HI MDM: CPT

## 2025-05-07 PROCEDURE — 73630 X-RAY EXAM OF FOOT: CPT | Mod: 26,RT

## 2025-05-07 PROCEDURE — 73600 X-RAY EXAM OF ANKLE: CPT | Mod: 26,RT

## 2025-05-07 RX ORDER — LOSARTAN POTASSIUM 100 MG/1
1 TABLET, FILM COATED ORAL
Refills: 0 | DISCHARGE

## 2025-05-07 RX ORDER — OCTREOTIDE ACETATE 500 UG/ML
50 INJECTION, SOLUTION INTRAVENOUS; SUBCUTANEOUS
Qty: 500 | Refills: 0 | Status: DISCONTINUED | OUTPATIENT
Start: 2025-05-07 | End: 2025-05-08

## 2025-05-07 RX ORDER — PHENOBARBITAL 30 MG/1
130 TABLET ORAL
Refills: 0 | Status: DISCONTINUED | OUTPATIENT
Start: 2025-05-07 | End: 2025-05-09

## 2025-05-07 RX ORDER — B1/B2/B3/B5/B6/B12/VIT C/FOLIC 500-0.5 MG
1 TABLET ORAL DAILY
Refills: 0 | Status: DISCONTINUED | OUTPATIENT
Start: 2025-05-07 | End: 2025-05-09

## 2025-05-07 RX ORDER — FOLIC ACID 1 MG/1
1 TABLET ORAL EVERY 24 HOURS
Refills: 0 | Status: DISCONTINUED | OUTPATIENT
Start: 2025-05-08 | End: 2025-05-09

## 2025-05-07 RX ORDER — CHLORDIAZEPOXIDE HCL 10 MG
50 CAPSULE ORAL ONCE
Refills: 0 | Status: DISCONTINUED | OUTPATIENT
Start: 2025-05-07 | End: 2025-05-07

## 2025-05-07 RX ORDER — CEFTRIAXONE 500 MG/1
2000 INJECTION, POWDER, FOR SOLUTION INTRAMUSCULAR; INTRAVENOUS
Refills: 0 | Status: DISCONTINUED | OUTPATIENT
Start: 2025-05-07 | End: 2025-05-09

## 2025-05-07 RX ORDER — PHENOBARBITAL 30 MG/1
520 TABLET ORAL ONCE
Refills: 0 | Status: DISCONTINUED | OUTPATIENT
Start: 2025-05-07 | End: 2025-05-07

## 2025-05-07 RX ORDER — MAGNESIUM SULFATE 500 MG/ML
2 SYRINGE (ML) INJECTION ONCE
Refills: 0 | Status: COMPLETED | OUTPATIENT
Start: 2025-05-07 | End: 2025-05-07

## 2025-05-07 RX ORDER — DIAZEPAM 2 MG/1
20 TABLET ORAL ONCE
Refills: 0 | Status: DISCONTINUED | OUTPATIENT
Start: 2025-05-07 | End: 2025-05-07

## 2025-05-07 RX ORDER — CALCIUM GLUCONATE 20 MG/ML
2 INJECTION, SOLUTION INTRAVENOUS ONCE
Refills: 0 | Status: COMPLETED | OUTPATIENT
Start: 2025-05-07 | End: 2025-05-07

## 2025-05-07 RX ORDER — FOLIC ACID 1 MG/1
1 TABLET ORAL EVERY 24 HOURS
Refills: 0 | Status: DISCONTINUED | OUTPATIENT
Start: 2025-05-07 | End: 2025-05-07

## 2025-05-07 RX ORDER — DIAZEPAM 2 MG/1
10 TABLET ORAL ONCE
Refills: 0 | Status: DISCONTINUED | OUTPATIENT
Start: 2025-05-07 | End: 2025-05-07

## 2025-05-07 RX ORDER — SODIUM CHLORIDE 9 G/1000ML
1000 INJECTION, SOLUTION INTRAVENOUS ONCE
Refills: 0 | Status: COMPLETED | OUTPATIENT
Start: 2025-05-07 | End: 2025-05-07

## 2025-05-07 RX ORDER — PHENOBARBITAL 30 MG/1
650 TABLET ORAL ONCE
Refills: 0 | Status: DISCONTINUED | OUTPATIENT
Start: 2025-05-07 | End: 2025-05-07

## 2025-05-07 RX ORDER — PHENOBARBITAL 30 MG/1
390 TABLET ORAL ONCE
Refills: 0 | Status: DISCONTINUED | OUTPATIENT
Start: 2025-05-07 | End: 2025-05-07

## 2025-05-07 RX ORDER — ACETAMINOPHEN 500 MG/5ML
1000 LIQUID (ML) ORAL ONCE
Refills: 0 | Status: COMPLETED | OUTPATIENT
Start: 2025-05-07 | End: 2025-05-07

## 2025-05-07 RX ORDER — OCTREOTIDE ACETATE 500 UG/ML
50 INJECTION, SOLUTION INTRAVENOUS; SUBCUTANEOUS ONCE
Refills: 0 | Status: COMPLETED | OUTPATIENT
Start: 2025-05-07 | End: 2025-05-07

## 2025-05-07 RX ORDER — POTASSIUM PHOSPHATE, MONOBASIC POTASSIUM PHOSPHATE, DIBASIC INJECTION, 236; 224 MG/ML; MG/ML
30 SOLUTION, CONCENTRATE INTRAVENOUS ONCE
Refills: 0 | Status: COMPLETED | OUTPATIENT
Start: 2025-05-07 | End: 2025-05-07

## 2025-05-07 RX ADMIN — Medication 100 MILLIEQUIVALENT(S): at 11:44

## 2025-05-07 RX ADMIN — PHENOBARBITAL 416 MILLIGRAM(S): 30 TABLET ORAL at 17:45

## 2025-05-07 RX ADMIN — Medication 105 MILLIGRAM(S): at 08:33

## 2025-05-07 RX ADMIN — DIAZEPAM 10 MILLIGRAM(S): 2 TABLET ORAL at 04:32

## 2025-05-07 RX ADMIN — POTASSIUM PHOSPHATE, MONOBASIC POTASSIUM PHOSPHATE, DIBASIC INJECTION, 83.33 MILLIMOLE(S): 236; 224 SOLUTION, CONCENTRATE INTRAVENOUS at 10:34

## 2025-05-07 RX ADMIN — SODIUM CHLORIDE 1000 MILLILITER(S): 9 INJECTION, SOLUTION INTRAVENOUS at 08:24

## 2025-05-07 RX ADMIN — Medication 40 MILLIGRAM(S): at 19:05

## 2025-05-07 RX ADMIN — Medication 1 TABLET(S): at 07:56

## 2025-05-07 RX ADMIN — Medication 40 MILLIEQUIVALENT(S): at 04:59

## 2025-05-07 RX ADMIN — Medication 100 MILLIEQUIVALENT(S): at 10:06

## 2025-05-07 RX ADMIN — Medication 10 MG/HR: at 08:32

## 2025-05-07 RX ADMIN — Medication 50 MILLIGRAM(S): at 05:21

## 2025-05-07 RX ADMIN — CALCIUM GLUCONATE 200 GRAM(S): 20 INJECTION, SOLUTION INTRAVENOUS at 05:08

## 2025-05-07 RX ADMIN — Medication 40 MILLIEQUIVALENT(S): at 05:40

## 2025-05-07 RX ADMIN — Medication 2000 MILLILITER(S): at 06:01

## 2025-05-07 RX ADMIN — Medication 105 MILLIGRAM(S): at 04:32

## 2025-05-07 RX ADMIN — SODIUM CHLORIDE 1000 MILLILITER(S): 9 INJECTION, SOLUTION INTRAVENOUS at 04:34

## 2025-05-07 RX ADMIN — DIAZEPAM 10 MILLIGRAM(S): 2 TABLET ORAL at 04:51

## 2025-05-07 RX ADMIN — FOLIC ACID 1 MILLIGRAM(S): 1 TABLET ORAL at 07:56

## 2025-05-07 RX ADMIN — OCTREOTIDE ACETATE 10 MICROGRAM(S)/HR: 500 INJECTION, SOLUTION INTRAVENOUS; SUBCUTANEOUS at 10:00

## 2025-05-07 RX ADMIN — Medication 25 GRAM(S): at 09:43

## 2025-05-07 RX ADMIN — Medication 1000 MILLIGRAM(S): at 09:49

## 2025-05-07 RX ADMIN — CEFTRIAXONE 100 MILLIGRAM(S): 500 INJECTION, POWDER, FOR SOLUTION INTRAMUSCULAR; INTRAVENOUS at 10:05

## 2025-05-07 RX ADMIN — Medication 100 MILLIEQUIVALENT(S): at 13:36

## 2025-05-07 RX ADMIN — Medication 105 MILLIGRAM(S): at 13:48

## 2025-05-07 RX ADMIN — OCTREOTIDE ACETATE 50 MICROGRAM(S): 500 INJECTION, SOLUTION INTRAVENOUS; SUBCUTANEOUS at 10:00

## 2025-05-07 RX ADMIN — Medication 500 MILLIGRAM(S): at 05:39

## 2025-05-07 RX ADMIN — Medication 80 MILLIGRAM(S): at 08:00

## 2025-05-07 RX ADMIN — PHENOBARBITAL 412 MILLIGRAM(S): 30 TABLET ORAL at 11:12

## 2025-05-07 RX ADMIN — OCTREOTIDE ACETATE 10 MICROGRAM(S)/HR: 500 INJECTION, SOLUTION INTRAVENOUS; SUBCUTANEOUS at 21:56

## 2025-05-07 RX ADMIN — PHENOBARBITAL 440 MILLIGRAM(S): 30 TABLET ORAL at 05:46

## 2025-05-07 RX ADMIN — PHENOBARBITAL 416 MILLIGRAM(S): 30 TABLET ORAL at 09:20

## 2025-05-07 RX ADMIN — Medication 400 MILLIGRAM(S): at 09:43

## 2025-05-07 RX ADMIN — Medication 105 MILLIGRAM(S): at 21:56

## 2025-05-07 NOTE — PROVIDER CONTACT NOTE (EICU) - ASSESSMENT
Protecting airway hemodynamically stable other than tachycardia  Getting IVF now  Awake alert responsive  Just received phenobarb ~5 minutes ago

## 2025-05-07 NOTE — ED PROVIDER NOTE - PROGRESS NOTE DETAILS
CIWA improving now ~12, case endorsed to ICU attending Dr Bui recommends 650 mg of phenobarbital, 2 L NS, and phosphorus serum

## 2025-05-07 NOTE — ED PROVIDER NOTE - PHYSICAL EXAMINATION
Physical Exam    Vital Signs: I have reviewed the initial vital signs.  Constitutional: well-appearing, appears stated age  Eyes: PERRLA, EOM intact, RAPD absent, and symmetrical lids.  ENT: Neck supple with no adenopathy, dry MM, tongue fasiculations  Cardiovascular: +regular tachycardia rate, regular rhythm, well-perfused extremities  Respiratory: unlabored respiratory effort, clear to auscultation bilaterally  Gastrointestinal: soft, non-tender abdomen, no pulsatile mass  Musculoskeletal: supple neck, no lower extremity edema, b/l hand tremors  Integumentary: warm, dry, no rash  Neurologic: awake, alert, extremities’ motor and sensory functions grossly intact  Psychiatric: A&Ox3, appropriate mood, appropriate affect

## 2025-05-07 NOTE — CONSULT NOTE ADULT - ASSESSMENT
This is a 44 year old male with alcohol use disorder who gastroenterology has been consulted for anemia and dark stool. Presented to [facility] 5/7/25 where he was subsequently transferred to [facility] for further evaluation and management of withdrawal symptoms. Also noted to have new anemia with hemoglobin from 3/2025 15.6 and today 7.1.     #Alcohol withdrawal  - Agree with phenobarb administration  - Continue to monitor for further signs of withdrawal and continue therapy as needed    #Acute hemodynamically stable normocytic anemia  - Ideally will plan for EGD once medically optimized from withdrawal standpoint. Potentially 5/8/25  - NPO for now  - Continue pantoprazole 40mg IV q12 hours  - Continue to monitor hemoglobin and transfuse with packed red blood cells if less than 7  - No need for emergent endoscopy at this time given hemodynamic stability. However if patient develops sustained hemodynamic instability despite appropriate resuscitative measures, please inform gastroenterology team.    #Alcohol use disorder  #Elevated liver chemistries  - Patient requires further hepatic evaluation for liver injury given significant alcohol use and elevated liver chemistries  - Please obtain abdominal ultrasound with doppler  - Please obtain daily CBC, CMP, INR   - Please obtain acute hepatitis panel       Shnat Lin DO  Gastroenterology Fellow  GI Consult Pager Weekdays 7am-5pm: [phone]  Weeknights/Weekend/Holiday Coverage: Please Call the  for Contact Information  Follow Up Questions Welcome via [place] Microsoft Teams Messenger

## 2025-05-07 NOTE — H&P ADULT - HISTORY OF PRESENT ILLNESS
**********INCOMPLETE NOTE**************    Patient is a 44-year-old male with PMH of HTN who initially presented to [facility] with complaints of anxiety, tremors with c/f alcohol withdrawal. Patient states that he was previously admitted at [facility] telemetry for similar presentation and was sober since that admission. However, recent stressors at work caused him to start binge drinking (2 pints Vodka/day) since Friday, 5/2 with last drink at 4PM the day PTA. Pt states that overnight he began to develop withdrawal symptoms and called EMS. Pt also endorses diarrhea for several days but was found to have 1 black tarry stool on admission. He denies any prior colonoscopy or EGD.     ED course:  Vitals: T98.7, , /63, RR 20, SpO2 98% on RA  Labs: WBC 16.11, Hgb 7.7, Na 126, K 3, bicarb 12, AG 27, lactate 3.1>6.2, , ,   EKG:   Imaging: none  Interventions: IV calcium gluconate 2g x1, librium 50mg x1, valium 10mg x2, phenobarb 650mg x1, KCl 40mEq x2, thiamine 500mg x1, 1L LR x2, 1L NS x1  Consults: GI Patient is a 44-year-old male with PMH of HTN who initially presented to [facility] with complaints of anxiety, tremors with c/f alcohol withdrawal. Patient states that he was previously admitted at [facility] telemetry 1 year ago for a similar presentation and was sober since that admission. However, recent stressors at work caused him to start binge drinking (2 pints Vodka/day) since Friday, 5/2 with last drink at 4PM the day PTA. Pt states that overnight he began to develop withdrawal symptoms and called EMS. Pt also endorses diarrhea for several days but was found to have 1 black tarry stool on admission. He also states that when he does drink, he would notice his stools would appear dark. He denies any prior colonoscopy or EGD, recent NSAID use. He denies any hematemesis, hemoptysis, hematochezia, CP, SOB, cough, abdominal pain, nausea, vomiting, or LE swelling.     ED course:  Vitals: T98.7, , /63, RR 20, SpO2 98% on RA  Labs: WBC 16.11, Hgb 7.7, Na 126, K 3, bicarb 12, AG 27, lactate 3.1>6.2, , ,   EKG: sinus tachycardia, rate 120s  Imaging: none  Interventions: IV calcium gluconate 2g x1, librium 50mg x1, valium 10mg x2, phenobarb 650mg x1, KCl 40mEq x2, thiamine 500mg x1, 1L LR x2, 2L NS x1  Consults: GI

## 2025-05-07 NOTE — ED PROVIDER NOTE - CLINICAL SUMMARY MEDICAL DECISION MAKING FREE TEXT BOX
43 yo m pmhx sig for ETOH use disorder here with alcohol withdrawal last drink was few hours pta, reports that he has been binge drinking for 6 days ran out of alcohol and has not drank in the last few hours, reports that he rapidly developed b/l hand tremors, head fullness, agitation and severe anxiety, CIWA 18 in the ER. Denies auditory/visual hallucinations, tactile disturbances.

## 2025-05-07 NOTE — ED PROVIDER NOTE - ATTENDING APP SHARED VISIT CONTRIBUTION OF CARE
Patient presenting with tremulousness, tongue fasiculations, tachycardia, CIWA of 18 on arrival to ED, receiving medications, fluids.  Will likely require admission for optimal control of withdrawal symptoms and abnormal vital signs.

## 2025-05-07 NOTE — H&P ADULT - ATTENDING COMMENTS
Pt arrived from [facility] and chart reviewed. Pt seen and CIWA 4 and able to give history. Pt admitted with alcohol with drawal, acute blood loss anemia, and melena. anion gap metabolic acidosis secondary to lactate of 6 which cleared with 5 liters IVF to 2.1. Hb 7. 7 on admission and 6.3 post fluid resuscitation. 2 units PRBC ordered and GI consulted. PPI and octreotide gtt ordered. Pt started on phenobarbital protocol in  ED and continued here. Abd U/s, ECHO, right ankle xray ordered (hx of mis step and pain and swelling at lateral malleolus on right). Osmolar gap 12 and unlikely pt took methanol or ethylene glycol. Continue CIWA protocol. NPO. follow CBC q 4 and Na. suspect hypoosmolar hyponatremia secondary to hypovolemia.

## 2025-05-07 NOTE — ED ADULT TRIAGE NOTE - CHIEF COMPLAINT QUOTE
BIBA with complaints of etoh withdrawal, symptoms include anxiety and sweating. Reports last drink was sometimes yesterday afternoon. Endorses he has been sober for 1 year and has been on a lee x 5-6 days. +hand tremors, +tongue fasciculations. Tachycardiac to 140bpm.

## 2025-05-07 NOTE — ED PROVIDER NOTE - OBJECTIVE STATEMENT
45 yo m pmhx sig for ETOH use disorder here with alcohol withdrawal last drink was few hours pta, reports that he has been binge drinking for 6 days ran out of alcohol and has not drank in the last few hours, reports that he rapidly developed b/l hand tremors, head fullness, agitation and severe anxiety, CIWA 18 in the ER. Denies auditory/visual hallucinations, tactile disturbances.    I have reviewed available current nursing and previous documentation of past medical, surgical, family, and/or social history.

## 2025-05-07 NOTE — H&P ADULT - ASSESSMENT
Patient is a 44-year-old male with PMH of HTN who initially presented to [facility] with complaints of anxiety, tremors with c/f alcohol withdrawal admitted to telemetry for management with phenobarbital and HAGMA 2/2 lactic acidosis iso possible UGIB.    Neuro   AOx4, HENRRY    Cardiovascular   #Sinus tachycardia   Likely iso active UGIB  s/p 1L LR x2, 1L NS x1 in ED  -     Pulmonary       GI   #UGIB  Episode of black tarry stool at admission  No prior EGD/colonscopy hx  - GI consult  - NPO  - IV pantoprazole gtt    Renal       ID      Heme      Endo      MSK    Prophylaxis  F: LR  E: Replete as needed, target K>4, Phos>3, Mg>2  N: NPO  DVT pptx: none  GI pptx: pantprazole  Code status: full code  Dispo: Telemetry Patient is a 44-year-old male with PMH of HTN who initially presented to [facility] with complaints of anxiety, tremors with c/f alcohol withdrawal admitted to telemetry for management with phenobarbital and HAGMA 2/2 lactic acidosis iso possible UGIB.    Neuro   #Alcohol withdrawal  CIWA 16 on admission with anxiety, tremors, nausea, agitation  No prior hx of seizures, intubations, or DT but was admitted 1 year ago for similar presentation  s/p librium 50mg x1, valium 10mg x2, phenobarb 650mg x1  - c/w phenobarbital load -- 520mg, 390mg  - check phenobarb level ~2 hours after last dose  - repeat BAL  - obtain BHB  - CIWA protocol in place  - SBIRT  - Folic acid 1mg IVP qd  - Thiamine 500mg IVPB qd x 7 days  - Aspiration precautions  - NPO    Cardiovascular   #Sinus tachycardia   Likely iso active UGIB  s/p 1L LR x2, 2L NS x1 in ED  - 1L LR bolus  - management of GIB as below    #HTN  home med: losartan 25mg   - hold home med for now iso severe sepsis and acute UGIB  - restart when appropriate    Pulmonary   #Aspiration pneumonitis vs PNA  - possible aspiration iso active withdrawal given leukocytosis, fever  - management per ID below  - aspiration precautions in place    GI   #UGIB  Episode of black tarry stool at admission however pt reports hx of dark stools whenever he consumes alcohol  No prior EGD/colonscopy hx  Tachycardic to 120s on admission with 6.2 lactate  - GI consult  - NPO  - IV pantoprazole 40mg BID  - IV octreotide 500mcg gtt  - Abdominal US  - management of anemia as below    #Transaminitis  #Abdominal distention  Moderate abdominal distention on exam, no evidence of ascites on POCUS  No prior liver hx  - acute hepatitis panel  - abdominal US  - trend LFTs q4  - GI consulted, appreciate recs      Renal   #HAGMA  Lactate 6.2, Bicarb 12, AG 27; ABG 7.42/27/18 -- HAGMA w/ respiratory alkalosis  ? If degree of AG however is fully explained by the lactate  Calculated serum osm 307 -- if measured serum osm is greater, then there may be another solute not accounted for (e.g. methanol, ethylene glycol, etc)  - repeat lactate, labs q4  - serum, urine osm  - repeat BAL  - obtain salicylate, acetaminophen levels  - UTox       #Hyponatremia   Na 127 at admission  Given alcohol use hx and likely alcoholic liver dx, likely hypotonic hypovolemic hyponatremia  - c/w LR bolus  - urine studies  - serum, urine osm  - trend CMP q4    ID  #Severe sepsis  T101.7, HR 120s, WBC 13.31 -- 3/4 SIRS met, lactate 6.2  Pt endorsing a few episode of diarrhea PTA, however diarrhea is likely from ongoing UGIB  No cough, CP, SOB, vomiting, further episodes of diarrhea  Likely fever from sympathetic stimulation iso active withdrawal vs aspiration pneumonitis/PNA vs SBP iso likely alcoholic liver dx; less likely other sources of infection  - start CTX 2g qd for management of aspiration PNA and SBP (5/7 -  - obtain BCx   - abdominal US  - UA w/ UCx    Heme  #Symptomatic acute blood loss anemia  Hgb 7.7 > 7.1 on admission with melenic stools  Hgb 15 during last admission in 2024  Likely acute blood loss anemia 2/2 UGIB and possible esophageal varices, however will also assess for other sources given no prior hx of EGD, colonoscopy   - transfuse 2 units pRBC now  - post-transfusion CBC this PM  - iron studies prior to transfusion  - monitor for signs of bleeding  - T&S x2  - transfuse for Hgb <7    Endo  HENRRY    MSK  #s/p mechanical fall  Pt endorsing falling on the stairs several days PTA and injuring his ankle  - R foot xray - 3 views  - R ankle xray - 2 views    Prophylaxis  F: LR  E: Replete as needed, target K>4, Phos>3, Mg>2  N: NPO  DVT pptx: none  GI pptx: pantoprazole  Code status: full code  Dispo: Telemetry

## 2025-05-07 NOTE — ED ADULT NURSE NOTE - NS PRO PASSIVE SMOKE EXP
Unknown Nsaids Pregnancy And Lactation Text: These medications are considered safe up to 30 weeks gestation. It is excreted in breast milk.

## 2025-05-07 NOTE — CONSULT NOTE ADULT - ATTENDING COMMENTS
Patient seen, examined, and discussed with Dr. Lin. Agree with above. 44M with alcohol use disorder who gastroenterology has been consulted for anemia and dark stool. Presented to [facility] 5/7/25 where he was subsequently transferred to [facility] for further evaluation and management of withdrawal symptoms. Also noted to have new anemia with hemoglobin from 3/2025 15.6 and today 7.1. Can tentatively plan for EGD tomorrow, NPO except meds past MN. Continue withdrawal treatment.     Deon Rico MD  Gastroenterology

## 2025-05-07 NOTE — CONSULT NOTE ADULT - SUBJECTIVE AND OBJECTIVE BOX
HPI:  This is a 44 year old male with alcohol use disorder who gastroenterology has been consulted for anemia and dark stool. Patient describes longstanding history of alcohol use disorder. States he will binge drink for multiple days, attempt to discontinue, and develop severe withdrawals, which he states occasionally require admission to medical facility. States that he was previously sober for approximately 1 year until he began consuming alcohol approximately 5/1/25. States he was consuming 2 pints vodka daily. Approximately 5/2/25 states he attempted to discontinue alcohol use, however began to experience symptoms which he classically associates with alcohol withdrawal including depression, tremors, and intense anxiety. Describes presenting to [facility] 5/7/25 where he was subsequently transferred to [facility] for further evaluation. At time of my initial interview, patient describes ongoing symptoms, but denies visual or auditory hallucinations. Denies previous seizures. Denies history of cirrhosis. States he has never had paracentesis. Upon further evaluation, patient noted to have new anemia. Hemoglobin from 3/2025 15.6 and today 7.1. Describes dark colored thick sticky bowel movements intermittently over previous few days. Denies previous EGD or colonoscopy.         PAST MEDICAL & SURGICAL HISTORY:  Alcohol withdrawal      Alcoholism      No significant past surgical history        Home Medications:  elderberry 350 mg oral capsule: 1 cap(s) orally once a day (02 Jan 2023 04:33)    Allergies    No Known Allergies    Intolerances      SOCIAL HISTORY:  Denies tobacco use  Denies alcohol use  Denies drug use    FAMILY HISTORY:  Family hx of lung cancer  MGM    FH: CAD (coronary artery disease)  Father      Mother: Healthy  Father: Healthy  MEDICATIONS  (STANDING):  cefTRIAXone   IVPB 2000 milliGRAM(s) IV Intermittent every 12 hours  multivitamin 1 Tablet(s) Oral daily  octreotide  Infusion 50 MICROgram(s)/Hr (10 mL/Hr) IV Continuous <Continuous>  pantoprazole  Injectable 40 milliGRAM(s) IV Push every 12 hours  PHENobarbital IVPB 390 milliGRAM(s) IV Intermittent once  potassium chloride  10 mEq/100 mL IVPB 10 milliEquivalent(s) IV Intermittent every 1 hour  potassium phosphate IVPB 30 milliMole(s) IV Intermittent once  thiamine IVPB 500 milliGRAM(s) IV Intermittent every 8 hours    MEDICATIONS  (PRN):      REVIEW OF SYSTEMS:  All 14 review of systems are negative except as noted in HPI.    Vital Signs Last 24 Hrs  T(C): 38.7 (07 May 2025 08:00), Max: 38.7 (07 May 2025 08:00)  T(F): 101.7 (07 May 2025 08:00), Max: 101.7 (07 May 2025 08:00)  HR: 129 (07 May 2025 07:42) (126 - 135)  BP: 139/73 (07 May 2025 07:42) (125/63 - 139/73)  BP(mean): 98 (07 May 2025 07:42) (98 - 98)  RR: 18 (07 May 2025 07:42) (18 - 21)  SpO2: 98% (07 May 2025 07:42) (98% - 98%)    Parameters below as of 07 May 2025 07:42  Patient On (Oxygen Delivery Method): room air          PHYSICAL EXAM:    General: Middle aged male, lying in bed, awake, well developed, well nourished  Eyes: Anicteric sclerae, moist conjunctivae, extraocular motions intact, pupils equal round and reactive to light  HENT: Pink and moist mucous membranes, good dentition, tongue normal in appearance without lesions and has symmetrical movement, no obvious oral lesions noted, pharynx normal without tonsillar swelling or exudates  Neck: Trachea midline, supple, no obvious lymphadenopathy  Chest: Symmetric appearing, non tender to palpation  Cardiovascular: Regular rate and rhythm, no obvious murmur rub or gallop  Respiratory: Normal respiratory effort, clear lung sounds in anterior and posterior posts bilaterally, no obvious rales ronchi or wheeze  Abdomen: Symmetric appearing, no obvious lesions, distended, normal bowel sounds, soft, non-tender to palpation, no rebound or guarding  Extremities: Normal range of motion, no clubbing cyanosis or edema  Neurological: Awake alert and oriented to person place time and situation  Skin: Warm and dry, no obvious rash, no jaundice    LABS:                        7.1    13.31 )-----------( 145      ( 07 May 2025 07:39 )             20.0     05-07    127[L]  |  88[L]  |  17  ----------------------------<  105[H]  3.5   |  18[L]  |  0.62    Ca    6.8[L]      07 May 2025 07:39  Phos  1.9     05-07  Mg     1.6     05-07    TPro  6.1  /  Alb  3.3  /  TBili  0.6  /  DBili  x   /  AST  278[H]  /  ALT  123[H]  /  AlkPhos  58  05-07        PT/INR - ( 07 May 2025 07:39 )   PT: 12.4 sec;   INR: 1.06          PTT - ( 07 May 2025 07:39 )  PTT:22.7 sec    RADIOLOGY & ADDITIONAL STUDIES:

## 2025-05-07 NOTE — H&P ADULT - NSHPPHYSICALEXAM_GEN_ALL_CORE
GENERAL: NAD, lying in bed comfortably  HEAD:  Atraumatic, normocephalic  EYES: EOMI, PERRLA, conjunctiva and sclera clear  NECK: Supple, trachea midline, no JVD  HEART: Regular rhythm, tachycardic, no murmurs, rubs, or gallops  LUNGS: Unlabored respirations.  Clear to auscultation bilaterally, no crackles, wheezing, or rhonchi  ABDOMEN: Soft, moderately distended without TTP  EXTREMITIES: 2+ peripheral pulses bilaterally. No clubbing, cyanosis, or edema  NERVOUS SYSTEM:  A&Ox3, moving all extremities, no focal deficits   SKIN: No rashes or lesions, no jaundice GENERAL: anxious, diaphoretic, tremulous; CIWA 7  HEAD:  Atraumatic, normocephalic  EYES: EOMI, PERRLA, conjunctiva and sclera clear  NECK: Supple, trachea midline, no JVD  HEART: Regular rhythm, tachycardic, no murmurs, rubs, or gallops  LUNGS: Unlabored respirations.  Clear to auscultation bilaterally, no crackles, wheezing, or rhonchi  ABDOMEN: Soft, moderately distended without TTP  EXTREMITIES: 2+ peripheral pulses bilaterally. No clubbing, cyanosis. Mild swelling of the R ankle with some TTP on the lateral malleolus  NERVOUS SYSTEM:  A&Ox3, moving all extremities, no focal deficits   SKIN: No rashes or lesions, no jaundice

## 2025-05-08 ENCOUNTER — TRANSCRIPTION ENCOUNTER (OUTPATIENT)
Age: 45
End: 2025-05-08

## 2025-05-08 DIAGNOSIS — R74.01 ELEVATION OF LEVELS OF LIVER TRANSAMINASE LEVELS: ICD-10-CM

## 2025-05-08 DIAGNOSIS — E87.29 OTHER ACIDOSIS: ICD-10-CM

## 2025-05-08 DIAGNOSIS — A41.9 SEPSIS, UNSPECIFIED ORGANISM: ICD-10-CM

## 2025-05-08 DIAGNOSIS — F10.239 ALCOHOL DEPENDENCE WITH WITHDRAWAL, UNSPECIFIED: ICD-10-CM

## 2025-05-08 DIAGNOSIS — I10 ESSENTIAL (PRIMARY) HYPERTENSION: ICD-10-CM

## 2025-05-08 DIAGNOSIS — J18.9 PNEUMONIA, UNSPECIFIED ORGANISM: ICD-10-CM

## 2025-05-08 DIAGNOSIS — Z29.9 ENCOUNTER FOR PROPHYLACTIC MEASURES, UNSPECIFIED: ICD-10-CM

## 2025-05-08 DIAGNOSIS — R74.8 ABNORMAL LEVELS OF OTHER SERUM ENZYMES: ICD-10-CM

## 2025-05-08 DIAGNOSIS — K92.2 GASTROINTESTINAL HEMORRHAGE, UNSPECIFIED: ICD-10-CM

## 2025-05-08 DIAGNOSIS — D64.9 ANEMIA, UNSPECIFIED: ICD-10-CM

## 2025-05-08 DIAGNOSIS — E87.1 HYPO-OSMOLALITY AND HYPONATREMIA: ICD-10-CM
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
## 2025-05-08 LAB
ALBUMIN SERPL ELPH-MCNC: 3.2 G/DL — LOW (ref 3.3–5)
ALP SERPL-CCNC: 63 U/L — SIGNIFICANT CHANGE UP (ref 40–120)
ALT FLD-CCNC: 98 U/L — HIGH (ref 10–45)
ANION GAP SERPL CALC-SCNC: 13 MMOL/L — SIGNIFICANT CHANGE UP (ref 5–17)
APTT BLD: 23.9 SEC — LOW (ref 26.1–36.8)
AST SERPL-CCNC: 191 U/L — HIGH (ref 10–40)
BASOPHILS # BLD AUTO: 0.03 K/UL — SIGNIFICANT CHANGE UP (ref 0–0.2)
BASOPHILS NFR BLD AUTO: 0.3 % — SIGNIFICANT CHANGE UP (ref 0–2)
BILIRUB SERPL-MCNC: 0.7 MG/DL — SIGNIFICANT CHANGE UP (ref 0.2–1.2)
BUN SERPL-MCNC: 7 MG/DL — SIGNIFICANT CHANGE UP (ref 7–23)
CALCIUM SERPL-MCNC: 7.1 MG/DL — LOW (ref 8.4–10.5)
CHLORIDE SERPL-SCNC: 90 MMOL/L — LOW (ref 96–108)
CO2 SERPL-SCNC: 27 MMOL/L — SIGNIFICANT CHANGE UP (ref 22–31)
CREAT SERPL-MCNC: 0.6 MG/DL — SIGNIFICANT CHANGE UP (ref 0.5–1.3)
EGFR: 123 ML/MIN/1.73M2 — SIGNIFICANT CHANGE UP
EGFR: 123 ML/MIN/1.73M2 — SIGNIFICANT CHANGE UP
EOSINOPHIL # BLD AUTO: 0.12 K/UL — SIGNIFICANT CHANGE UP (ref 0–0.5)
EOSINOPHIL NFR BLD AUTO: 1.2 % — SIGNIFICANT CHANGE UP (ref 0–6)
GLUCOSE SERPL-MCNC: 136 MG/DL — HIGH (ref 70–99)
HCT VFR BLD CALC: 24.5 % — LOW (ref 39–50)
HGB BLD-MCNC: 8.4 G/DL — LOW (ref 13–17)
IMM GRANULOCYTES NFR BLD AUTO: 0.7 % — SIGNIFICANT CHANGE UP (ref 0–0.9)
INR BLD: 1.01 — SIGNIFICANT CHANGE UP (ref 0.85–1.16)
LYMPHOCYTES # BLD AUTO: 1.91 K/UL — SIGNIFICANT CHANGE UP (ref 1–3.3)
LYMPHOCYTES # BLD AUTO: 18.6 % — SIGNIFICANT CHANGE UP (ref 13–44)
MAGNESIUM SERPL-MCNC: 2 MG/DL — SIGNIFICANT CHANGE UP (ref 1.6–2.6)
MCHC RBC-ENTMCNC: 29.3 PG — SIGNIFICANT CHANGE UP (ref 27–34)
MCHC RBC-ENTMCNC: 34.3 G/DL — SIGNIFICANT CHANGE UP (ref 32–36)
MCV RBC AUTO: 85.4 FL — SIGNIFICANT CHANGE UP (ref 80–100)
MONOCYTES # BLD AUTO: 0.53 K/UL — SIGNIFICANT CHANGE UP (ref 0–0.9)
MONOCYTES NFR BLD AUTO: 5.2 % — SIGNIFICANT CHANGE UP (ref 2–14)
NEUTROPHILS # BLD AUTO: 7.61 K/UL — HIGH (ref 1.8–7.4)
NEUTROPHILS NFR BLD AUTO: 74 % — SIGNIFICANT CHANGE UP (ref 43–77)
NRBC BLD AUTO-RTO: 1 /100 WBCS — HIGH (ref 0–0)
PHOSPHATE SERPL-MCNC: 0.9 MG/DL — CRITICAL LOW (ref 2.5–4.5)
PLATELET # BLD AUTO: 140 K/UL — LOW (ref 150–400)
POTASSIUM SERPL-MCNC: 3.3 MMOL/L — LOW (ref 3.5–5.3)
POTASSIUM SERPL-SCNC: 3.3 MMOL/L — LOW (ref 3.5–5.3)
PROT SERPL-MCNC: 6 G/DL — SIGNIFICANT CHANGE UP (ref 6–8.3)
PROTHROM AB SERPL-ACNC: 11.6 SEC — SIGNIFICANT CHANGE UP (ref 9.9–13.4)
RBC # BLD: 2.87 M/UL — LOW (ref 4.2–5.8)
RBC # FLD: 15.2 % — HIGH (ref 10.3–14.5)
SODIUM SERPL-SCNC: 130 MMOL/L — LOW (ref 135–145)
WBC # BLD: 10.27 K/UL — SIGNIFICANT CHANGE UP (ref 3.8–10.5)
WBC # FLD AUTO: 10.27 K/UL — SIGNIFICANT CHANGE UP (ref 3.8–10.5)

## 2025-05-08 PROCEDURE — ZZZZZ: CPT

## 2025-05-08 PROCEDURE — 43235 EGD DIAGNOSTIC BRUSH WASH: CPT | Mod: GC

## 2025-05-08 RX ORDER — LIDOCAINE HYDROCHLORIDE 20 MG/ML
1 JELLY TOPICAL ONCE
Refills: 0 | Status: COMPLETED | OUTPATIENT
Start: 2025-05-08 | End: 2025-05-08

## 2025-05-08 RX ORDER — POTASSIUM PHOSPHATE, MONOBASIC POTASSIUM PHOSPHATE, DIBASIC INJECTION, 236; 224 MG/ML; MG/ML
15 SOLUTION, CONCENTRATE INTRAVENOUS ONCE
Refills: 0 | Status: COMPLETED | OUTPATIENT
Start: 2025-05-08 | End: 2025-05-08

## 2025-05-08 RX ORDER — SOD PHOS DI, MONO/K PHOS MONO 250 MG
2 TABLET ORAL ONCE
Refills: 0 | Status: COMPLETED | OUTPATIENT
Start: 2025-05-08 | End: 2025-05-08

## 2025-05-08 RX ORDER — SUCRALFATE 1 G
1 TABLET ORAL EVERY 6 HOURS
Refills: 0 | Status: DISCONTINUED | OUTPATIENT
Start: 2025-05-08 | End: 2025-05-09

## 2025-05-08 RX ORDER — POTASSIUM PHOSPHATE, MONOBASIC POTASSIUM PHOSPHATE, DIBASIC INJECTION, 236; 224 MG/ML; MG/ML
30 SOLUTION, CONCENTRATE INTRAVENOUS ONCE
Refills: 0 | Status: COMPLETED | OUTPATIENT
Start: 2025-05-08 | End: 2025-05-08

## 2025-05-08 RX ADMIN — Medication 40 MILLIGRAM(S): at 07:16

## 2025-05-08 RX ADMIN — Medication 105 MILLIGRAM(S): at 22:16

## 2025-05-08 RX ADMIN — Medication 1 TABLET(S): at 14:44

## 2025-05-08 RX ADMIN — Medication 40 MILLIGRAM(S): at 18:03

## 2025-05-08 RX ADMIN — Medication 100 MILLIEQUIVALENT(S): at 09:02

## 2025-05-08 RX ADMIN — OCTREOTIDE ACETATE 10 MICROGRAM(S)/HR: 500 INJECTION, SOLUTION INTRAVENOUS; SUBCUTANEOUS at 07:27

## 2025-05-08 RX ADMIN — FOLIC ACID 1 MILLIGRAM(S): 1 TABLET ORAL at 09:02

## 2025-05-08 RX ADMIN — Medication 1 GRAM(S): at 22:16

## 2025-05-08 RX ADMIN — Medication 40 MILLIEQUIVALENT(S): at 16:40

## 2025-05-08 RX ADMIN — POTASSIUM PHOSPHATE, MONOBASIC POTASSIUM PHOSPHATE, DIBASIC INJECTION, 83.33 MILLIMOLE(S): 236; 224 SOLUTION, CONCENTRATE INTRAVENOUS at 09:34

## 2025-05-08 RX ADMIN — Medication 1 GRAM(S): at 15:43

## 2025-05-08 RX ADMIN — CEFTRIAXONE 100 MILLIGRAM(S): 500 INJECTION, POWDER, FOR SOLUTION INTRAMUSCULAR; INTRAVENOUS at 14:44

## 2025-05-08 RX ADMIN — Medication 105 MILLIGRAM(S): at 14:59

## 2025-05-08 RX ADMIN — Medication 2 PACKET(S): at 07:16

## 2025-05-08 RX ADMIN — Medication 105 MILLIGRAM(S): at 07:05

## 2025-05-08 RX ADMIN — LIDOCAINE HYDROCHLORIDE 1 PATCH: 20 JELLY TOPICAL at 07:41

## 2025-05-08 RX ADMIN — LIDOCAINE HYDROCHLORIDE 1 PATCH: 20 JELLY TOPICAL at 05:29

## 2025-05-08 RX ADMIN — OCTREOTIDE ACETATE 10 MICROGRAM(S)/HR: 500 INJECTION, SOLUTION INTRAVENOUS; SUBCUTANEOUS at 07:31

## 2025-05-08 RX ADMIN — LIDOCAINE HYDROCHLORIDE 1 PATCH: 20 JELLY TOPICAL at 17:41

## 2025-05-08 NOTE — PATIENT PROFILE ADULT - FALL HARM RISK - HARM RISK INTERVENTIONS
Assistance with ambulation/Assistance OOB with selected safe patient handling equipment/Communicate Risk of Fall with Harm to all staff/Monitor for mental status changes/Monitor gait and stability/Reinforce activity limits and safety measures with patient and family/Tailored Fall Risk Interventions/Toileting schedule using arm’s reach rule for commode and bathroom/Use of alarms - bed, chair and/or voice tab/Visual Cue: Yellow wristband and red socks/Bed in lowest position, wheels locked, appropriate side rails in place/Call bell, personal items and telephone in reach/Instruct patient to call for assistance before getting out of bed or chair/Non-slip footwear when patient is out of bed/[place] to call system/Physically safe environment - no spills, clutter or unnecessary equipment/Purposeful Proactive Rounding/Room/bathroom lighting operational, light cord in reach

## 2025-05-08 NOTE — PROGRESS NOTE ADULT - ASSESSMENT
Patient is a 44-year-old male with PMH of HTN who initially presented to [facility] with complaints of anxiety, tremors with c/f alcohol withdrawal admitted to telemetry for management with phenobarbital and HAGMA 2/2 lactic acidosis iso likely UGIB

## 2025-05-08 NOTE — CHART NOTE - NSCHARTNOTEFT_GEN_A_CORE
5/8/25 EGD    Impressions:  	Grade D esophagitis in the mid and lower third of the esophagus compatible with nonspecific erosive esophagitis.  	Erythema in the whole stomach compatible with gastropathy.  	Otherwise normal stomach. .  	Esophageal hiatal hernia.  	Erythema in the duodenum compatible with duodenopathy.    Plan  - Likely patient's anemia and melenic bowel movements secondary to LA grade D esophagitis     - Return to floor for further management   - Advance diet as tolerated     - Pantoprazole 40mg orally q12 hours   - Carafate Suspension 1g po qid      Shant Lin DO  Gastroenterology Fellow  GI Consult Pager Weekdays 7am-5pm: [phone]  Weeknights/Weekend/Holiday Coverage: Please Call the  for Contact Information  Follow Up Questions Welcome via [place] Microsoft Teams Messenger

## 2025-05-08 NOTE — PROGRESS NOTE ADULT - ASSESSMENT
Patient is a 44-year-old male with PMH of HTN who initially presented to [facility] with complaints of anxiety, tremors with c/f alcohol withdrawal admitted to telemetry for management with phenobarbital and HAGMA 2/2 lactic acidosis iso possible UGIB.    Neuro   #Alcohol withdrawal  CIWA 16 on admission with anxiety, tremors, nausea, agitation  No prior hx of seizures, intubations, or DT but was admitted 1 year ago for similar presentation  s/p librium 50mg x1, valium 10mg x2, phenobarb 650mg x1  - c/w phenobarbital load -- 520mg, 390mg  - check phenobarb level ~2 hours after last dose  - repeat BAL  - obtain BHB  - CIWA protocol in place  - SBIRT  - Folic acid 1mg IVP qd  - Thiamine 500mg IVPB qd x 7 days  - Aspiration precautions  - NPO    Cardiovascular   #Sinus tachycardia   Likely iso active UGIB  s/p 1L LR x2, 2L NS x1 in ED  - 1L LR bolus  - management of GIB as below    #HTN  home med: losartan 25mg   - hold home med for now iso severe sepsis and acute UGIB  - restart when appropriate    Pulmonary   #Aspiration pneumonitis vs PNA  - possible aspiration iso active withdrawal given leukocytosis, fever  - management per ID below  - aspiration precautions in place    GI   #UGIB  Episode of black tarry stool at admission however pt reports hx of dark stools whenever he consumes alcohol  No prior EGD/colonscopy hx  Tachycardic to 120s on admission with 6.2 lactate  - GI consult  - NPO  - IV pantoprazole 40mg BID  - IV octreotide 500mcg gtt  - Abdominal US  - management of anemia as below    #Transaminitis  #Abdominal distention  Moderate abdominal distention on exam, no evidence of ascites on POCUS  No prior liver hx  - acute hepatitis panel  - abdominal US  - trend LFTs q4  - GI consulted, appreciate recs      Renal   #HAGMA  Lactate 6.2, Bicarb 12, AG 27; ABG 7.42/27/18 -- HAGMA w/ respiratory alkalosis  ? If degree of AG however is fully explained by the lactate  Calculated serum osm 307 -- if measured serum osm is greater, then there may be another solute not accounted for (e.g. methanol, ethylene glycol, etc)  - repeat lactate, labs q4  - serum, urine osm  - repeat BAL  - obtain salicylate, acetaminophen levels  - UTox       #Hyponatremia   Na 127 at admission  Given alcohol use hx and likely alcoholic liver dx, likely hypotonic hypovolemic hyponatremia  - c/w LR bolus  - urine studies  - serum, urine osm  - trend CMP q4    ID  #Severe sepsis  T101.7, HR 120s, WBC 13.31 -- 3/4 SIRS met, lactate 6.2  Pt endorsing a few episode of diarrhea PTA, however diarrhea is likely from ongoing UGIB  No cough, CP, SOB, vomiting, further episodes of diarrhea  Likely fever from sympathetic stimulation iso active withdrawal vs aspiration pneumonitis/PNA vs SBP iso likely alcoholic liver dx; less likely other sources of infection  - start CTX 2g qd for management of aspiration PNA and SBP (5/7 -  - obtain BCx   - abdominal US  - UA w/ UCx    Heme  #Symptomatic acute blood loss anemia  Hgb 7.7 > 7.1 on admission with melenic stools  Hgb 15 during last admission in 2024  Likely acute blood loss anemia 2/2 UGIB and possible esophageal varices, however will also assess for other sources given no prior hx of EGD, colonoscopy   - transfuse 2 units pRBC now  - post-transfusion CBC this PM  - iron studies prior to transfusion  - monitor for signs of bleeding  - T&S x2  - transfuse for Hgb <7    Endo  HENRRY    MSK  #s/p mechanical fall  Pt endorsing falling on the stairs several days PTA and injuring his ankle  - R foot xray - 3 views  - R ankle xray - 2 views    Prophylaxis  F: LR  E: Replete as needed, target K>4, Phos>3, Mg>2  N: NPO  DVT pptx: none  GI pptx: pantoprazole  Code status: full code  Dispo: Telemetry Patient is a 44-year-old male with PMH of HTN who initially presented to [facility] with complaints of anxiety, tremors with c/f alcohol withdrawal admitted to telemetry for management with phenobarbital and HAGMA 2/2 lactic acidosis iso likely UGIB.    Neuro   #Alcohol withdrawal  CIWA 16 on admission with anxiety, tremors, nausea, agitation  No prior hx of seizures, intubations, or DT but was admitted 1 year ago for similar presentation   >> <10, BHB 1.5  s/p librium 50mg x1, valium 10mg x2, phenobarb 650mg x1  s/p phenobarb load - 650/520/390mg -- level 20.2  - CIWA protocol in place -- pt within 96 hour window until 5/11 evening  - SBIRT  - Folic acid 1mg IVP qd  - Thiamine 500mg IVPB qd x 7 days  - Aspiration precautions    Cardiovascular   #Sinus tachycardia, RESOLVED  Likely iso active UGIB vs alcohol withdrawal  s/p 1L LR x2, 2L NS x1 in ED  - management of GIB as below    #HTN  home med: losartan 25mg   - hold home med for now iso severe sepsis and acute UGIB  - restart when appropriate    Pulmonary   #Aspiration pneumonitis vs PNA  - possible aspiration iso active withdrawal given leukocytosis, fever  - management per ID below  - aspiration precautions in place    GI   #UGIB  Episode of black tarry stool at admission however pt reports hx of dark stools whenever he consumes alcohol  No prior EGD/colonscopy hx  Tachycardic to 120s on admission with 6.2 lactate  Abdominal US with hepatic steatosis  - Per GI       - EGD today       - NPO       - IV pantoprazole 40mg BID       - IV octreotide 500mcg gtt  - management of anemia as below    #Transaminitis  #Abdominal distention  Moderate abdominal distention on exam, no evidence of ascites on POCUS  No prior liver hx  Abdominal US with hepatic steatosis  Hepatitis panel negative  - trend LFTs  - GI consulted, appreciate recs      Renal   #HAGMA, RESOLVED  Lactate 6.2, Bicarb 12, AG 27; ABG 7.42/27/18 -- HAGMA w/ respiratory alkalosis  ? If degree of AG however is fully explained by the lactate  Calculated serum osm 307 -- if measured serum osm is greater, then there may be another solute not accounted for (e.g. methanol, ethylene glycol, etc)  Lactate 1.5 s/p 5L IVF   >> <10; salicylate, acetaminophen negative; Utox negative  Serum osm 278, less likely acute toxic ingestion of other alcohols  UA +ketones -- likely HAGMA 2/2 lactic acidosis and starvation ketosis    #Hyponatremia   Na 127 at admission  Given alcohol use hx and likely alcoholic liver dx, likely hypotonic hypovolemic hyponatremia  Serum osm 278, urine Na 27   - urine osm  - encourage PO intake  - daily BMP    #Hypokalemia  #Hypophosphatemia  5/8: K 3.3, Phos 0.9 -- given KPhos 30 mmol IV x1, 250 phos-NaK x2, and 10meq of K x3   Electrolyte derangements likely 2/2 alcohol use  - replete aggressively as above  - repeat BMP this PM    ID  #Severe sepsis  T101.7, HR 120s, WBC 13.31 -- 3/4 SIRS met, lactate 6.2  Pt endorsing a few episode of diarrhea PTA, however diarrhea is likely from ongoing UGIB  No cough, CP, SOB, vomiting, further episodes of diarrhea  Likely fever from sympathetic stimulation iso active withdrawal vs aspiration pneumonitis/PNA vs SBP iso likely alcoholic liver dx; less likely other sources of infection  UA negative, abdominal US w/ hepatic steatosis  - c/w CTX 2g qd for management of aspiration PNA and SBP (5/7 -  - f/u BCx   - f/u UCx    Heme  #Symptomatic acute blood loss anemia  Hgb 7.7 > 7.1 on admission with melenic stools  Hgb 15 during last admission in 2024  Likely acute blood loss anemia 2/2 UGIB and possible esophageal varices, however will also assess for other sources given no prior hx of EGD, colonoscopy   s/p 2 units pRBC >> Hgb 8.5  Iron studies unremarkable  - monitor for signs of bleeding  - T&S x2  - transfuse for Hgb <7    Endo  HENRRY    MSK  #s/p mechanical fall  Pt endorsing falling on the stairs several days PTA and injuring his ankle  R foot, ankle xrays negative for fx  - pain management with tylenol, lidocaine patches    Prophylaxis  F: as needed  E: Replete as needed, target K>4, Phos>3, Mg>2  N: NPO  DVT pptx: none  GI pptx: pantoprazole  Code status: full code  Dispo: Telemetry Patient is a 44-year-old male with PMH of HTN who initially presented to [facility] with complaints of anxiety, tremors with c/f alcohol withdrawal admitted to telemetry for management with phenobarbital and HAGMA 2/2 lactic acidosis iso likely UGIB.    Neuro   #Alcohol withdrawal  CIWA 16 on admission with anxiety, tremors, nausea, agitation  No prior hx of seizures, intubations, or DT but was admitted 1 year ago for similar presentation   >> <10, BHB 1.5  s/p librium 50mg x1, valium 10mg x2, phenobarb 650mg x1  s/p phenobarb load - 650/520/390mg -- level 20.2  - CIWA protocol in place -- pt within 96 hour window until 5/11 evening  - SBIRT  - Folic acid 1mg IVP qd  - Thiamine 500mg IVPB qd x 7 days  - Aspiration precautions    Cardiovascular   #Sinus tachycardia, RESOLVED  Likely iso active UGIB vs alcohol withdrawal  s/p 1L LR x2, 2L NS x1 in ED  - management of GIB as below    #HTN  home med: losartan 25mg   - hold home med for now iso severe sepsis and acute UGIB  - restart when appropriate    Pulmonary   #Aspiration pneumonitis vs PNA  - possible aspiration iso active withdrawal given leukocytosis, fever  - management per ID below  - aspiration precautions in place    GI   #UGIB  Episode of black tarry stool at admission however pt reports hx of dark stools whenever he consumes alcohol  No prior EGD/colonscopy hx  Tachycardic to 120s on admission with 6.2 lactate  Abdominal US with hepatic steatosis  - Per GI       - EGD today       - NPO       - IV pantoprazole 40mg BID       - IV octreotide 500mcg gtt  - management of anemia as below    #Transaminitis  #Abdominal distention  Moderate abdominal distention on exam, no evidence of ascites on POCUS  No prior liver hx  Abdominal US with hepatic steatosis  Hepatitis panel negative  - trend LFTs  - GI consulted, appreciate recs      Renal   #HAGMA, RESOLVED  Lactate 6.2, Bicarb 12, AG 27; ABG 7.42/27/18 -- HAGMA w/ respiratory alkalosis  ? If degree of AG however is fully explained by the lactate  Calculated serum osm 307 -- if measured serum osm is greater, then there may be another solute not accounted for (e.g. methanol, ethylene glycol, etc)  Lactate 1.5 s/p 5L IVF   >> <10; salicylate, acetaminophen negative; Utox negative  Serum osm 278, less likely acute toxic ingestion of other alcohols  UA +ketones -- likely HAGMA 2/2 lactic acidosis and starvation ketosis    #Hyponatremia   Na 127 at admission  Given alcohol use hx and likely alcoholic liver dx, likely hypotonic hypovolemic hyponatremia  Serum osm 278, urine Na 27   - urine osm  - encourage PO intake  - daily BMP    #Hypokalemia  #Hypophosphatemia  5/8: K 3.3, Phos 0.9 -- given KPhos 30 mmol IV x1, 250 phos-NaK x2, and 10meq of K x3   Electrolyte derangements likely 2/2 alcohol use  - replete aggressively as above  - repeat BMP this PM    ID  #Severe sepsis  T101.7, HR 120s, WBC 13.31 -- 3/4 SIRS met, lactate 6.2  Pt endorsing a few episode of diarrhea PTA, however diarrhea is likely from ongoing UGIB  No cough, CP, SOB, vomiting, further episodes of diarrhea  Likely fever from sympathetic stimulation iso active withdrawal vs aspiration pneumonitis/PNA vs SBP iso likely alcoholic liver dx; less likely other sources of infection  UA negative, abdominal US w/ hepatic steatosis  - c/w CTX 2g qd for management of aspiration PNA and SBP (5/7 -  - f/u BCx   - f/u UCx    Heme  #Symptomatic acute blood loss anemia  Hgb 7.7 > 7.1 on admission with melenic stools  Hgb 15 during last admission in 2024  Likely acute blood loss anemia 2/2 UGIB and possible esophageal varices, however will also assess for other sources given no prior hx of EGD, colonoscopy   s/p 2 units pRBC >> Hgb 8.5  Iron studies with total 226, %sat 86, ferritin 461   - monitor for signs of bleeding  - T&S x2  - transfuse for Hgb <7    Endo  HENRRY    MSK  #s/p mechanical fall  Pt endorsing falling on the stairs several days PTA and injuring his ankle  R foot, ankle xrays negative for fx  - pain management with tylenol, lidocaine patches    Prophylaxis  F: as needed  E: Replete as needed, target K>4, Phos>3, Mg>2  N: NPO  DVT pptx: none  GI pptx: pantoprazole  Code status: full code  Dispo: Telemetry Patient is a 44-year-old male with PMH of HTN who initially presented to [facility] with complaints of anxiety, tremors with c/f alcohol withdrawal admitted to telemetry for management with phenobarbital and HAGMA 2/2 lactic acidosis iso likely UGIB.    Neuro   #Alcohol withdrawal  CIWA 16 on admission with anxiety, tremors, nausea, agitation  No prior hx of seizures, intubations, or DT but was admitted 1 year ago for similar presentation   >> <10, BHB 1.5  s/p librium 50mg x1, valium 10mg x2, phenobarb 650mg x1  s/p phenobarb load - 650/520/390mg -- level 20.2  - CIWA protocol in place -- pt within 96 hour window until 5/11 evening  - SBIRT  - Folic acid 1mg IVP qd  - Thiamine 500mg IVPB qd x 7 days  - Aspiration precautions    Cardiovascular   #Sinus tachycardia, RESOLVED  Likely iso active UGIB vs alcohol withdrawal  s/p 1L LR x2, 2L NS x1 in ED  - management of GIB as below    #HTN  home med: losartan 25mg   - hold home med for now iso severe sepsis and acute UGIB  - restart when appropriate    Pulmonary   #Aspiration pneumonitis vs PNA  - possible aspiration iso active withdrawal given leukocytosis, fever  - management per ID below  - aspiration precautions in place    GI   #UGIB  Episode of black tarry stool at admission however pt reports hx of dark stools whenever he consumes alcohol  No prior EGD/colonscopy hx  Tachycardic to 120s on admission with 6.2 lactate  Abdominal US with hepatic steatosis  - Per GI       - EGD today       - NPO       - IV pantoprazole 40mg BID       - IV octreotide 500mcg gtt -- will d/c if no evidence of varices on EGD  - management of anemia as below    #Transaminitis  #Abdominal distention  Moderate abdominal distention on exam, no evidence of ascites on POCUS  No prior liver hx  Abdominal US with hepatic steatosis  Hepatitis panel negative  - trend LFTs  - GI consulted, appreciate recs      Renal   #HAGMA, RESOLVED  Lactate 6.2, Bicarb 12, AG 27; ABG 7.42/27/18 -- HAGMA w/ respiratory alkalosis  ? If degree of AG however is fully explained by the lactate  Calculated serum osm 307 -- if measured serum osm is greater, then there may be another solute not accounted for (e.g. methanol, ethylene glycol, etc)  Lactate 1.5 s/p 5L IVF   >> <10; salicylate, acetaminophen negative; Utox negative  Serum osm 278, less likely acute toxic ingestion of other alcohols  UA +ketones -- likely HAGMA 2/2 lactic acidosis and starvation ketosis    #Hyponatremia   Na 127 at admission  Given alcohol use hx and likely alcoholic liver dx, likely hypotonic hypovolemic hyponatremia  Serum osm 278, urine Na 27   - urine osm  - encourage PO intake  - daily BMP    #Hypokalemia  #Hypophosphatemia  5/8: K 3.3, Phos 0.9 -- given KPhos 30 mmol IV x1, 250 phos-NaK x2, and 10meq of K x3   Electrolyte derangements likely 2/2 alcohol use  - replete aggressively as above  - repeat BMP this PM    ID  #Severe sepsis  T101.7, HR 120s, WBC 13.31 -- 3/4 SIRS met, lactate 6.2  Pt endorsing a few episode of diarrhea PTA, however diarrhea is likely from ongoing UGIB  No cough, CP, SOB, vomiting, further episodes of diarrhea  Likely fever from sympathetic stimulation iso active withdrawal vs aspiration pneumonitis/PNA vs SBP iso likely alcoholic liver dx; less likely other sources of infection  UA negative, abdominal US w/ hepatic steatosis  - c/w CTX 2g qd for management of aspiration PNA and SBP (5/7 -  - f/u BCx   - f/u UCx    Heme  #Symptomatic acute blood loss anemia  Hgb 7.7 > 7.1 on admission with melenic stools  Hgb 15 during last admission in 2024  Likely acute blood loss anemia 2/2 UGIB and possible esophageal varices, however will also assess for other sources given no prior hx of EGD, colonoscopy   s/p 2 units pRBC >> Hgb 8.5  Iron studies with total 226, %sat 86, ferritin 461   - monitor for signs of bleeding  - T&S x2  - transfuse for Hgb <7    Endo  HENRRY    MSK  #s/p mechanical fall  Pt endorsing falling on the stairs several days PTA and injuring his ankle  R foot, ankle xrays negative for fx  - pain management with tylenol, lidocaine patches    Prophylaxis  F: as needed  E: Replete as needed, target K>4, Phos>3, Mg>2  N: NPO  DVT pptx: none  GI pptx: pantoprazole  Code status: full code  Dispo: Telemetry Patient is a 44-year-old male with PMH of HTN who initially presented to [facility] with complaints of anxiety, tremors with c/f alcohol withdrawal admitted to telemetry for management with phenobarbital and HAGMA 2/2 lactic acidosis iso likely UGIB.    Neuro   #Alcohol withdrawal  CIWA 16 on admission with anxiety, tremors, nausea, agitation  No prior hx of seizures, intubations, or DT but was admitted 1 year ago for similar presentation   >> <10, BHB 1.5  s/p librium 50mg x1, valium 10mg x2, phenobarb 650mg x1  s/p phenobarb load - 650/520/390mg -- level 20.2  - CIWA protocol in place -- pt within 96 hour window until 5/11 evening  - SBIRT  - Folic acid 1mg IVP qd  - Thiamine 500mg IVPB qd x 7 days  - Aspiration precautions    Cardiovascular   #Sinus tachycardia, RESOLVED  Likely iso active UGIB vs alcohol withdrawal  s/p 1L LR x2, 2L NS x1 in ED  - management of GIB as below    #HTN  home med: losartan 25mg   - hold home med for now iso severe sepsis and acute UGIB  - restart when appropriate    Pulmonary   #Aspiration pneumonitis vs PNA  - possible aspiration iso active withdrawal given leukocytosis, fever  - management per ID below  - aspiration precautions in place    GI   #UGIB  Episode of black tarry stool at admission however pt reports hx of dark stools whenever he consumes alcohol  No prior EGD/colonscopy hx  Tachycardic to 120s on admission with 6.2 lactate  Abdominal US with hepatic steatosis  - Per GI       - EGD today       - NPO       - IV pantoprazole 40mg BID       - IV octreotide 500mcg gtt -- will d/c if no evidence of varices on EGD  - management of anemia as below    #Transaminitis  #Abdominal distention  Moderate abdominal distention on exam, no evidence of ascites on POCUS  No prior liver hx  Abdominal US with hepatic steatosis  Hepatitis panel negative  - trend LFTs  - GI consulted, appreciate recs      Renal   #HAGMA, RESOLVED  Lactate 6.2, Bicarb 12, AG 27; ABG 7.42/27/18 -- HAGMA w/ respiratory alkalosis  ? If degree of AG however is fully explained by the lactate  Calculated serum osm 307 -- if measured serum osm is greater, then there may be another solute not accounted for (e.g. methanol, ethylene glycol, etc)  Lactate 1.5 s/p 5L IVF   >> <10; salicylate, acetaminophen negative; Utox negative  Serum osm 278, less likely acute toxic ingestion of other alcohols  UA +ketones -- likely HAGMA 2/2 lactic acidosis and starvation ketosis    #Hyponatremia   Na 127 at admission  Given alcohol use hx and likely alcoholic liver dx, likely hypotonic hypovolemic hyponatremia  Serum osm 278, urine Na 27   - urine osm  - encourage PO intake  - daily BMP    #Hypokalemia  #Hypophosphatemia  5/8: K 3.3, Phos 0.9 -- given KPhos 30 mmol IV x1, 250 phos-NaK x2, and 10meq of K x3   Electrolyte derangements likely 2/2 alcohol use  - replete aggressively as above  - repeat BMP this PM    ID  #Severe sepsis  T101.7, HR 120s, WBC 13.31 -- 3/4 SIRS met, lactate 6.2  Pt endorsing a few episode of diarrhea PTA, however diarrhea is likely from ongoing UGIB  No cough, CP, SOB, vomiting, further episodes of diarrhea  Likely fever from sympathetic stimulation iso active withdrawal vs aspiration pneumonitis/PNA vs SBP iso likely alcoholic liver dx; less likely other sources of infection  UA negative, abdominal US w/ hepatic steatosis  - c/w CTX 2g qd for management of aspiration PNA and SBP (5/7 -  - f/u BCx   - f/u UCx    Heme  #Symptomatic acute blood loss anemia  Hgb 7.7 > 7.1 on admission with melenic stools  Hgb 15 during last admission in 2024  Likely acute blood loss anemia 2/2 UGIB and possible esophageal varices, however will also assess for other sources given no prior hx of EGD, colonoscopy   s/p 2 units pRBC >> Hgb 8.5  Iron studies with total 226, %sat 86, ferritin 461   - monitor for signs of bleeding  - T&S x2  - transfuse for Hgb <7    Endo  HENRRY    MSK  #s/p mechanical fall  Pt endorsing falling on the stairs several days PTA and injuring his ankle  R foot, ankle xrays negative for fx  - pain management with tylenol, lidocaine patches    Prophylaxis  F: as needed  E: Replete as needed, target K>4, Phos>3, Mg>2  N: NPO  DVT pptx: none  GI pptx: pantoprazole  Code status: full code  Dispo: Telemetry >> RMF

## 2025-05-08 NOTE — PATIENT PROFILE ADULT - SAFE PLACE TO LIVE
The patient is requesting a letter to be able to work from home any questions please call the patient back at [phone] call routed for the nurses pool.   no

## 2025-05-08 NOTE — PROGRESS NOTE ADULT - SUBJECTIVE AND OBJECTIVE BOX
***********TRANSFER FROM [facility] to [facility]*******************  Patient is a 44-year-old male with PMH of HTN who initially presented to [facility] with complaints of anxiety, tremors with c/f alcohol withdrawal admitted to telemetry for management with phenobarbital and HAGMA 2/2 lactic acidosis iso likely UGIB. Hgb 7.7>6.7 requiring 2 units pRBC , lactate 6.2>1.5 s/p 5L LR. Given phenobarb load (650/520/390), level 20.6 without further pushes needed. Started on octreotide gtt for possible varices iso active GIB and CTX for ?aspiration PNA vs ?SBP. R foot xrays negative for fx. EGD on 5/8 showing grade D esophagitis, gastropathy, and duodenopathy and without further episodes of melena. Patient is medically stable for stepdown to [facility]..    INTERVAL/OVERNIGHT EVENTS: None    SUBJECTIVE:  Patient seen and examined at bedside, comfortable, NAD. Denied fever, chest pain, dyspnea, abdominal pain.     Vital Signs Last 12 Hrs  T(F): 99.8 (05-08-25 @ 09:11), Max: 100 (05-08-25 @ 06:00)  HR: 100 (05-08-25 @ 12:22) (96 - 100)  BP: 142/65 (05-08-25 @ 12:22) (113/57 - 142/65)  BP(mean): 93 (05-08-25 @ 12:22) (77 - 93)  RR: 18 (05-08-25 @ 12:22) (17 - 18)  SpO2: 97% (05-08-25 @ 12:22) (97% - 99%)  I&O's Summary    07 May 2025 07:01  -  08 May 2025 07:00  --------------------------------------------------------  IN: 1690 mL / OUT: 2800 mL / NET: -1110 mL    08 May 2025 07:01  -  08 May 2025 14:20  --------------------------------------------------------  IN: 40 mL / OUT: 0 mL / NET: 40 mL        PHYSICAL EXAM:  General: NAD  HEENT: PERRL, EOM intact, sclera anicteric, MMM  Cardiovascular: RRR; no MRG; no JVD  Respiratory: CTAB; no WRR  GI/: soft;soft, NT, moderately distended; +BSx4  Extremities: WWP; 2+ peripheral pulses bilaterally; ; trace nonpitting LE edema. Mild TTP along lateral malleolus   Skin: normal color & turgor; no rash  Neurologic: aox3; no focal deficits    LABS:                        8.4    10.27 )-----------( 140      ( 08 May 2025 06:05 )             24.5     05-08    130[L]  |  90[L]  |  7   ----------------------------<  136[H]  3.3[L]   |  27  |  0.60    Ca    7.1[L]      08 May 2025 06:05  Phos  0.9     05-08  Mg     2.0     05-08    TPro  6.0  /  Alb  3.2[L]  /  TBili  0.7  /  DBili  x   /  AST  191[H]  /  ALT  98[H]  /  AlkPhos  63  05-08    PT/INR - ( 08 May 2025 06:05 )   PT: 11.6 sec;   INR: 1.01          PTT - ( 08 May 2025 06:05 )  PTT:23.9 sec  Urinalysis Basic - ( 08 May 2025 06:05 )    Color: x / Appearance: x / SG: x / pH: x  Gluc: 136 mg/dL / Ketone: x  / Bili: x / Urobili: x   Blood: x / Protein: x / Nitrite: x   Leuk Esterase: x / RBC: x / WBC x   Sq Epi: x / Non Sq Epi: x / Bacteria: x          RADIOLOGY & ADDITIONAL TESTS:    MEDICATIONS  (STANDING):  cefTRIAXone   IVPB 2000 milliGRAM(s) IV Intermittent <User Schedule>  folic acid Injectable 1 milliGRAM(s) IV Push every 24 hours  multivitamin 1 Tablet(s) Oral daily  pantoprazole    Tablet 40 milliGRAM(s) Oral every 12 hours  potassium chloride  10 mEq/100 mL IVPB 10 milliEquivalent(s) IV Intermittent every 1 hour  sucralfate 1 Gram(s) Oral every 6 hours  thiamine IVPB 500 milliGRAM(s) IV Intermittent every 8 hours    MEDICATIONS  (PRN):  PHENobarbital Injectable 130 milliGRAM(s) IV Push every 15 minutes PRN Increasing CIWA

## 2025-05-08 NOTE — PROGRESS NOTE ADULT - SUBJECTIVE AND OBJECTIVE BOX
OVERNIGHT EVENTS:    SUBJECTIVE / INTERVAL HPI: Patient seen and examined at bedside.       PHYSICAL EXAM:    General: Lying comfortably and in no acute distress  HEENT: NC/AT; EOMI, anicteric sclera; MMM  Neck: supple  Cardiovascular: +S1/S2, RRR  Respiratory: CTA B/L; no W/R/R  Gastrointestinal: soft, NT/ND; +BSx4  Extremities: WWP; no edema, clubbing or cyanosis  Vascular: 2+ radial pulses B/L  Neurological: AAOx3; no focal deficits  Psychiatric: pleasant mood and affect  Dermatologic: no appreciable wounds or damage to the skin    VITAL SIGNS:  Vital Signs Last 24 Hrs  T(C): 37.8 (08 May 2025 06:00), Max: 38.7 (07 May 2025 08:00)  T(F): 100 (08 May 2025 06:00), Max: 101.7 (07 May 2025 08:00)  HR: 96 (08 May 2025 04:13) (96 - 129)  BP: 113/57 (08 May 2025 04:13) (105/51 - 154/82)  BP(mean): 77 (08 May 2025 04:13) (72 - 110)  RR: 17 (08 May 2025 04:13) (16 - 18)  SpO2: 99% (08 May 2025 04:13) (96% - 100%)    Parameters below as of 08 May 2025 04:13  Patient On (Oxygen Delivery Method): room air          MEDICATIONS:  MEDICATIONS  (STANDING):  cefTRIAXone   IVPB 2000 milliGRAM(s) IV Intermittent <User Schedule>  folic acid Injectable 1 milliGRAM(s) IV Push every 24 hours  multivitamin 1 Tablet(s) Oral daily  octreotide  Infusion 50 MICROgram(s)/Hr (10 mL/Hr) IV Continuous <Continuous>  pantoprazole  Injectable 40 milliGRAM(s) IV Push every 12 hours  potassium phosphate IVPB 30 milliMole(s) IV Intermittent once  thiamine IVPB 500 milliGRAM(s) IV Intermittent every 8 hours    MEDICATIONS  (PRN):  PHENobarbital Injectable 130 milliGRAM(s) IV Push every 15 minutes PRN Increasing CIWA      ALLERGIES:  Allergies    No Known Allergies    Intolerances        LABS:                        8.4    10.27 )-----------( 140      ( 08 May 2025 06:05 )             24.5     05-08    130[L]  |  90[L]  |  7   ----------------------------<  136[H]  3.3[L]   |  27  |  0.60    Ca    7.1[L]      08 May 2025 06:05  Phos  0.9     05-08  Mg     2.0     05-08    TPro  6.0  /  Alb  3.2[L]  /  TBili  0.7  /  DBili  x   /  AST  191[H]  /  ALT  98[H]  /  AlkPhos  63  05-08    PT/INR - ( 08 May 2025 06:05 )   PT: 11.6 sec;   INR: 1.01          PTT - ( 08 May 2025 06:05 )  PTT:23.9 sec  Urinalysis Basic - ( 08 May 2025 06:05 )    Color: x / Appearance: x / SG: x / pH: x  Gluc: 136 mg/dL / Ketone: x  / Bili: x / Urobili: x   Blood: x / Protein: x / Nitrite: x   Leuk Esterase: x / RBC: x / WBC x   Sq Epi: x / Non Sq Epi: x / Bacteria: x      CAPILLARY BLOOD GLUCOSE      POCT Blood Glucose.: 118 mg/dL (07 May 2025 10:47)      RADIOLOGY & ADDITIONAL TESTS: Reviewed. HOSPITAL COURSE: Patient is a 44-year-old male with PMH of HTN who initially presented to [facility] with complaints of anxiety, tremors with c/f alcohol withdrawal admitted to telemetry for management with phenobarbital and HAGMA 2/2 lactic acidosis iso likely UGIB. Hgb 7.7>6.7 requiring 2 units pRBC , lactate 6.2>1.2 s/p 1L LR.     OVERNIGHT EVENTS: 10PM Hb stable 8.1, Na 131>129. Phenobarbital level 20.6, Sx controlled therefore no additional dose ordered. Lidocaine patch ordered and ice given for R foot discomfort.    SUBJECTIVE / INTERVAL HPI: Patient seen and examined at bedside.       PHYSICAL EXAM:    General: Lying comfortably and in no acute distress  HEENT: NC/AT; EOMI, anicteric sclera; MMM  Neck: supple  Cardiovascular: +S1/S2, RRR  Respiratory: CTA B/L; no W/R/R  Gastrointestinal: soft, NT/ND; +BSx4  Extremities: WWP; no edema, clubbing or cyanosis  Vascular: 2+ radial pulses B/L  Neurological: AAOx3; no focal deficits  Psychiatric: pleasant mood and affect  Dermatologic: no appreciable wounds or damage to the skin    VITAL SIGNS:  Vital Signs Last 24 Hrs  T(C): 37.8 (08 May 2025 06:00), Max: 38.7 (07 May 2025 08:00)  T(F): 100 (08 May 2025 06:00), Max: 101.7 (07 May 2025 08:00)  HR: 96 (08 May 2025 04:13) (96 - 129)  BP: 113/57 (08 May 2025 04:13) (105/51 - 154/82)  BP(mean): 77 (08 May 2025 04:13) (72 - 110)  RR: 17 (08 May 2025 04:13) (16 - 18)  SpO2: 99% (08 May 2025 04:13) (96% - 100%)    Parameters below as of 08 May 2025 04:13  Patient On (Oxygen Delivery Method): room air          MEDICATIONS:  MEDICATIONS  (STANDING):  cefTRIAXone   IVPB 2000 milliGRAM(s) IV Intermittent <User Schedule>  folic acid Injectable 1 milliGRAM(s) IV Push every 24 hours  multivitamin 1 Tablet(s) Oral daily  octreotide  Infusion 50 MICROgram(s)/Hr (10 mL/Hr) IV Continuous <Continuous>  pantoprazole  Injectable 40 milliGRAM(s) IV Push every 12 hours  potassium phosphate IVPB 30 milliMole(s) IV Intermittent once  thiamine IVPB 500 milliGRAM(s) IV Intermittent every 8 hours    MEDICATIONS  (PRN):  PHENobarbital Injectable 130 milliGRAM(s) IV Push every 15 minutes PRN Increasing CIWA      ALLERGIES:  Allergies    No Known Allergies    Intolerances        LABS:                        8.4    10.27 )-----------( 140      ( 08 May 2025 06:05 )             24.5     05-08    130[L]  |  90[L]  |  7   ----------------------------<  136[H]  3.3[L]   |  27  |  0.60    Ca    7.1[L]      08 May 2025 06:05  Phos  0.9     05-08  Mg     2.0     05-08    TPro  6.0  /  Alb  3.2[L]  /  TBili  0.7  /  DBili  x   /  AST  191[H]  /  ALT  98[H]  /  AlkPhos  63  05-08    PT/INR - ( 08 May 2025 06:05 )   PT: 11.6 sec;   INR: 1.01          PTT - ( 08 May 2025 06:05 )  PTT:23.9 sec  Urinalysis Basic - ( 08 May 2025 06:05 )    Color: x / Appearance: x / SG: x / pH: x  Gluc: 136 mg/dL / Ketone: x  / Bili: x / Urobili: x   Blood: x / Protein: x / Nitrite: x   Leuk Esterase: x / RBC: x / WBC x   Sq Epi: x / Non Sq Epi: x / Bacteria: x      CAPILLARY BLOOD GLUCOSE      POCT Blood Glucose.: 118 mg/dL (07 May 2025 10:47)      RADIOLOGY & ADDITIONAL TESTS: Reviewed. HOSPITAL COURSE: Patient is a 44-year-old male with PMH of HTN who initially presented to [facility] with complaints of anxiety, tremors with c/f alcohol withdrawal admitted to telemetry for management with phenobarbital and HAGMA 2/2 lactic acidosis iso likely UGIB. Hgb 7.7>6.7 requiring 2 units pRBC , lactate 6.2>1.5 s/p 5L LR. Given phenobarb load (650/520/390), level 20.6 without further pushes needed. Started on octreotide gtt for possible varices iso active GIB and CTX for ?aspiration PNA vs ?SBP. R foot xrays negative for fx. EGD on 5/8 showing ________.    OVERNIGHT EVENTS: 10PM Hb stable 8.1, Na 131>129. Phenobarbital level 20.6, Sx controlled therefore no additional dose ordered. Lidocaine patch ordered and ice given for R foot discomfort.    SUBJECTIVE / INTERVAL HPI: Patient seen and examined at bedside. Patient reports significant improvement in his withdrawal symptoms compared to yesterday, still admits to some nausea and anxiety but denies any vomiting, headache, tremors, auditory or visual hallucinations, or agitation. He reports 1 black, tarry BM overnight but denies any other sources of bleeding including hematemesis, hemoptysis, or hematochezia. He is amendable to the plan for EGD today. He otherwise denies any CP, SOB, cough, abdominal pain, or LE swelling.     PHYSICAL EXAM:    General: Lying comfortably and in no acute distress  HEENT: NC/AT; EOMI, anicteric sclera; MMM  Neck: supple  Cardiovascular: +S1/S2, RRR  Respiratory: CTA B/L; no W/R/R  Gastrointestinal: soft, NT, moderately distended; +BSx4  Extremities: WWP; no clubbing or cyanosis; trace nonpitting LE edema. Mild TTP along lateral malleolus   Vascular: 2+ radial pulses B/L  Neurological: AAOx3; sensation intact globally, strength 5/5 globally  Psychiatric: pleasant mood and affect  Dermatologic: no appreciable wounds or damage to the skin    VITAL SIGNS:  Vital Signs Last 24 Hrs  T(C): 37.8 (08 May 2025 06:00), Max: 38.7 (07 May 2025 08:00)  T(F): 100 (08 May 2025 06:00), Max: 101.7 (07 May 2025 08:00)  HR: 96 (08 May 2025 04:13) (96 - 129)  BP: 113/57 (08 May 2025 04:13) (105/51 - 154/82)  BP(mean): 77 (08 May 2025 04:13) (72 - 110)  RR: 17 (08 May 2025 04:13) (16 - 18)  SpO2: 99% (08 May 2025 04:13) (96% - 100%)    Parameters below as of 08 May 2025 04:13  Patient On (Oxygen Delivery Method): room air          MEDICATIONS:  MEDICATIONS  (STANDING):  cefTRIAXone   IVPB 2000 milliGRAM(s) IV Intermittent <User Schedule>  folic acid Injectable 1 milliGRAM(s) IV Push every 24 hours  multivitamin 1 Tablet(s) Oral daily  octreotide  Infusion 50 MICROgram(s)/Hr (10 mL/Hr) IV Continuous <Continuous>  pantoprazole  Injectable 40 milliGRAM(s) IV Push every 12 hours  potassium phosphate IVPB 30 milliMole(s) IV Intermittent once  thiamine IVPB 500 milliGRAM(s) IV Intermittent every 8 hours    MEDICATIONS  (PRN):  PHENobarbital Injectable 130 milliGRAM(s) IV Push every 15 minutes PRN Increasing CIWA      ALLERGIES:  Allergies    No Known Allergies    Intolerances        LABS:                        8.4    10.27 )-----------( 140      ( 08 May 2025 06:05 )             24.5     05-08    130[L]  |  90[L]  |  7   ----------------------------<  136[H]  3.3[L]   |  27  |  0.60    Ca    7.1[L]      08 May 2025 06:05  Phos  0.9     05-08  Mg     2.0     05-08    TPro  6.0  /  Alb  3.2[L]  /  TBili  0.7  /  DBili  x   /  AST  191[H]  /  ALT  98[H]  /  AlkPhos  63  05-08    PT/INR - ( 08 May 2025 06:05 )   PT: 11.6 sec;   INR: 1.01          PTT - ( 08 May 2025 06:05 )  PTT:23.9 sec  Urinalysis Basic - ( 08 May 2025 06:05 )    Color: x / Appearance: x / SG: x / pH: x  Gluc: 136 mg/dL / Ketone: x  / Bili: x / Urobili: x   Blood: x / Protein: x / Nitrite: x   Leuk Esterase: x / RBC: x / WBC x   Sq Epi: x / Non Sq Epi: x / Bacteria: x      CAPILLARY BLOOD GLUCOSE      POCT Blood Glucose.: 118 mg/dL (07 May 2025 10:47)      RADIOLOGY & ADDITIONAL TESTS: Reviewed. HOSPITAL COURSE: Patient is a 44-year-old male with PMH of HTN who initially presented to [facility] with complaints of anxiety, tremors with c/f alcohol withdrawal admitted to telemetry for management with phenobarbital and HAGMA 2/2 lactic acidosis iso likely UGIB. Hgb 7.7>6.7 requiring 2 units pRBC , lactate 6.2>1.5 s/p 5L LR. Given phenobarb load (650/520/390), level 20.6 without further pushes needed. Started on octreotide gtt for possible varices iso active GIB and CTX for ?aspiration PNA vs ?SBP. R foot xrays negative for fx. EGD on 5/8 showing grade D esophagitis, gastropathy, and duodenopathy.    OVERNIGHT EVENTS: 10PM Hb stable 8.1, Na 131>129. Phenobarbital level 20.6, Sx controlled therefore no additional dose ordered. Lidocaine patch ordered and ice given for R foot discomfort.    SUBJECTIVE / INTERVAL HPI: Patient seen and examined at bedside. Patient reports significant improvement in his withdrawal symptoms compared to yesterday, still admits to some nausea and anxiety but denies any vomiting, headache, tremors, auditory or visual hallucinations, or agitation. He reports 1 black, tarry BM overnight but denies any other sources of bleeding including hematemesis, hemoptysis, or hematochezia. He is amendable to the plan for EGD today. He otherwise denies any CP, SOB, cough, abdominal pain, or LE swelling.     PHYSICAL EXAM:    General: Lying comfortably and in no acute distress  HEENT: NC/AT; EOMI, anicteric sclera; MMM  Neck: supple  Cardiovascular: +S1/S2, RRR  Respiratory: CTA B/L; no W/R/R  Gastrointestinal: soft, NT, moderately distended; +BSx4  Extremities: WWP; no clubbing or cyanosis; trace nonpitting LE edema. Mild TTP along lateral malleolus   Vascular: 2+ radial pulses B/L  Neurological: AAOx3; sensation intact globally, strength 5/5 globally  Psychiatric: pleasant mood and affect  Dermatologic: no appreciable wounds or damage to the skin    VITAL SIGNS:  Vital Signs Last 24 Hrs  T(C): 37.8 (08 May 2025 06:00), Max: 38.7 (07 May 2025 08:00)  T(F): 100 (08 May 2025 06:00), Max: 101.7 (07 May 2025 08:00)  HR: 96 (08 May 2025 04:13) (96 - 129)  BP: 113/57 (08 May 2025 04:13) (105/51 - 154/82)  BP(mean): 77 (08 May 2025 04:13) (72 - 110)  RR: 17 (08 May 2025 04:13) (16 - 18)  SpO2: 99% (08 May 2025 04:13) (96% - 100%)    Parameters below as of 08 May 2025 04:13  Patient On (Oxygen Delivery Method): room air          MEDICATIONS:  MEDICATIONS  (STANDING):  cefTRIAXone   IVPB 2000 milliGRAM(s) IV Intermittent <User Schedule>  folic acid Injectable 1 milliGRAM(s) IV Push every 24 hours  multivitamin 1 Tablet(s) Oral daily  octreotide  Infusion 50 MICROgram(s)/Hr (10 mL/Hr) IV Continuous <Continuous>  pantoprazole  Injectable 40 milliGRAM(s) IV Push every 12 hours  potassium phosphate IVPB 30 milliMole(s) IV Intermittent once  thiamine IVPB 500 milliGRAM(s) IV Intermittent every 8 hours    MEDICATIONS  (PRN):  PHENobarbital Injectable 130 milliGRAM(s) IV Push every 15 minutes PRN Increasing CIWA      ALLERGIES:  Allergies    No Known Allergies    Intolerances        LABS:                        8.4    10.27 )-----------( 140      ( 08 May 2025 06:05 )             24.5     05-08    130[L]  |  90[L]  |  7   ----------------------------<  136[H]  3.3[L]   |  27  |  0.60    Ca    7.1[L]      08 May 2025 06:05  Phos  0.9     05-08  Mg     2.0     05-08    TPro  6.0  /  Alb  3.2[L]  /  TBili  0.7  /  DBili  x   /  AST  191[H]  /  ALT  98[H]  /  AlkPhos  63  05-08    PT/INR - ( 08 May 2025 06:05 )   PT: 11.6 sec;   INR: 1.01          PTT - ( 08 May 2025 06:05 )  PTT:23.9 sec  Urinalysis Basic - ( 08 May 2025 06:05 )    Color: x / Appearance: x / SG: x / pH: x  Gluc: 136 mg/dL / Ketone: x  / Bili: x / Urobili: x   Blood: x / Protein: x / Nitrite: x   Leuk Esterase: x / RBC: x / WBC x   Sq Epi: x / Non Sq Epi: x / Bacteria: x      CAPILLARY BLOOD GLUCOSE      POCT Blood Glucose.: 118 mg/dL (07 May 2025 10:47)      RADIOLOGY & ADDITIONAL TESTS: Reviewed. ---------------------------Transfer from [facility] to [facility]---------------------------  HOSPITAL COURSE: Patient is a 44-year-old male with PMH of HTN who initially presented to [facility] with complaints of anxiety, tremors with c/f alcohol withdrawal admitted to telemetry for management with phenobarbital and HAGMA 2/2 lactic acidosis iso likely UGIB. Hgb 7.7>6.7 requiring 2 units pRBC , lactate 6.2>1.5 s/p 5L LR. Given phenobarb load (650/520/390), level 20.6 without further pushes needed. Started on octreotide gtt for possible varices iso active GIB and CTX for ?aspiration PNA vs ?SBP. R foot xrays negative for fx. EGD on 5/8 showing grade D esophagitis, gastropathy, and duodenopathy and without further episodes of melena. Patient is medically stable for stepdown to [facility]..    OVERNIGHT EVENTS: 10PM Hb stable 8.1, Na 131>129. Phenobarbital level 20.6, Sx controlled therefore no additional dose ordered. Lidocaine patch ordered and ice given for R foot discomfort.    SUBJECTIVE / INTERVAL HPI: Patient seen and examined at bedside. Patient reports significant improvement in his withdrawal symptoms compared to yesterday, still admits to some nausea and anxiety but denies any vomiting, headache, tremors, auditory or visual hallucinations, or agitation. He reports 1 black, tarry BM overnight but denies any other sources of bleeding including hematemesis, hemoptysis, or hematochezia. He is amendable to the plan for EGD today. He otherwise denies any CP, SOB, cough, abdominal pain, or LE swelling.     PHYSICAL EXAM:    General: Lying comfortably and in no acute distress  HEENT: NC/AT; EOMI, anicteric sclera; MMM  Neck: supple  Cardiovascular: +S1/S2, RRR  Respiratory: CTA B/L; no W/R/R  Gastrointestinal: soft, NT, moderately distended; +BSx4  Extremities: WWP; no clubbing or cyanosis; trace nonpitting LE edema. Mild TTP along lateral malleolus   Vascular: 2+ radial pulses B/L  Neurological: AAOx3; sensation intact globally, strength 5/5 globally  Psychiatric: pleasant mood and affect  Dermatologic: no appreciable wounds or damage to the skin    VITAL SIGNS:  Vital Signs Last 24 Hrs  T(C): 37.8 (08 May 2025 06:00), Max: 38.7 (07 May 2025 08:00)  T(F): 100 (08 May 2025 06:00), Max: 101.7 (07 May 2025 08:00)  HR: 96 (08 May 2025 04:13) (96 - 129)  BP: 113/57 (08 May 2025 04:13) (105/51 - 154/82)  BP(mean): 77 (08 May 2025 04:13) (72 - 110)  RR: 17 (08 May 2025 04:13) (16 - 18)  SpO2: 99% (08 May 2025 04:13) (96% - 100%)    Parameters below as of 08 May 2025 04:13  Patient On (Oxygen Delivery Method): room air          MEDICATIONS:  MEDICATIONS  (STANDING):  cefTRIAXone   IVPB 2000 milliGRAM(s) IV Intermittent <User Schedule>  folic acid Injectable 1 milliGRAM(s) IV Push every 24 hours  multivitamin 1 Tablet(s) Oral daily  octreotide  Infusion 50 MICROgram(s)/Hr (10 mL/Hr) IV Continuous <Continuous>  pantoprazole  Injectable 40 milliGRAM(s) IV Push every 12 hours  potassium phosphate IVPB 30 milliMole(s) IV Intermittent once  thiamine IVPB 500 milliGRAM(s) IV Intermittent every 8 hours    MEDICATIONS  (PRN):  PHENobarbital Injectable 130 milliGRAM(s) IV Push every 15 minutes PRN Increasing CIWA      ALLERGIES:  Allergies    No Known Allergies    Intolerances        LABS:                        8.4    10.27 )-----------( 140      ( 08 May 2025 06:05 )             24.5     05-08    130[L]  |  90[L]  |  7   ----------------------------<  136[H]  3.3[L]   |  27  |  0.60    Ca    7.1[L]      08 May 2025 06:05  Phos  0.9     05-08  Mg     2.0     05-08    TPro  6.0  /  Alb  3.2[L]  /  TBili  0.7  /  DBili  x   /  AST  191[H]  /  ALT  98[H]  /  AlkPhos  63  05-08    PT/INR - ( 08 May 2025 06:05 )   PT: 11.6 sec;   INR: 1.01          PTT - ( 08 May 2025 06:05 )  PTT:23.9 sec  Urinalysis Basic - ( 08 May 2025 06:05 )    Color: x / Appearance: x / SG: x / pH: x  Gluc: 136 mg/dL / Ketone: x  / Bili: x / Urobili: x   Blood: x / Protein: x / Nitrite: x   Leuk Esterase: x / RBC: x / WBC x   Sq Epi: x / Non Sq Epi: x / Bacteria: x      CAPILLARY BLOOD GLUCOSE      POCT Blood Glucose.: 118 mg/dL (07 May 2025 10:47)      RADIOLOGY & ADDITIONAL TESTS: Reviewed.

## 2025-05-09 ENCOUNTER — TRANSCRIPTION ENCOUNTER (OUTPATIENT)
Age: 45
End: 2025-05-09

## 2025-05-09 VITALS
DIASTOLIC BLOOD PRESSURE: 70 MMHG | SYSTOLIC BLOOD PRESSURE: 118 MMHG | TEMPERATURE: 98 F | HEART RATE: 99 BPM | OXYGEN SATURATION: 99 %

## 2025-05-09 LAB
ALBUMIN SERPL ELPH-MCNC: 3.6 G/DL — SIGNIFICANT CHANGE UP (ref 3.3–5)
ALP SERPL-CCNC: 65 U/L — SIGNIFICANT CHANGE UP (ref 40–120)
ALT FLD-CCNC: 95 U/L — HIGH (ref 10–45)
ANION GAP SERPL CALC-SCNC: 15 MMOL/L — SIGNIFICANT CHANGE UP (ref 5–17)
AST SERPL-CCNC: 140 U/L — HIGH (ref 10–40)
BASOPHILS # BLD AUTO: 0.03 K/UL — SIGNIFICANT CHANGE UP (ref 0–0.2)
BASOPHILS NFR BLD AUTO: 0.2 % — SIGNIFICANT CHANGE UP (ref 0–2)
BILIRUB SERPL-MCNC: 0.4 MG/DL — SIGNIFICANT CHANGE UP (ref 0.2–1.2)
BUN SERPL-MCNC: 7 MG/DL — SIGNIFICANT CHANGE UP (ref 7–23)
CALCIUM SERPL-MCNC: 7.8 MG/DL — LOW (ref 8.4–10.5)
CHLORIDE SERPL-SCNC: 99 MMOL/L — SIGNIFICANT CHANGE UP (ref 96–108)
CO2 SERPL-SCNC: 25 MMOL/L — SIGNIFICANT CHANGE UP (ref 22–31)
CREAT SERPL-MCNC: 0.55 MG/DL — SIGNIFICANT CHANGE UP (ref 0.5–1.3)
EGFR: 126 ML/MIN/1.73M2 — SIGNIFICANT CHANGE UP
EGFR: 126 ML/MIN/1.73M2 — SIGNIFICANT CHANGE UP
EOSINOPHIL # BLD AUTO: 0.03 K/UL — SIGNIFICANT CHANGE UP (ref 0–0.5)
EOSINOPHIL NFR BLD AUTO: 0.2 % — SIGNIFICANT CHANGE UP (ref 0–6)
GLUCOSE SERPL-MCNC: 99 MG/DL — SIGNIFICANT CHANGE UP (ref 70–99)
HCT VFR BLD CALC: 26 % — LOW (ref 39–50)
HGB BLD-MCNC: 8.7 G/DL — LOW (ref 13–17)
IMM GRANULOCYTES NFR BLD AUTO: 1.8 % — HIGH (ref 0–0.9)
LYMPHOCYTES # BLD AUTO: 26.3 % — SIGNIFICANT CHANGE UP (ref 13–44)
LYMPHOCYTES # BLD AUTO: 3.3 K/UL — SIGNIFICANT CHANGE UP (ref 1–3.3)
MAGNESIUM SERPL-MCNC: 2.2 MG/DL — SIGNIFICANT CHANGE UP (ref 1.6–2.6)
MCHC RBC-ENTMCNC: 30.2 PG — SIGNIFICANT CHANGE UP (ref 27–34)
MCHC RBC-ENTMCNC: 33.5 G/DL — SIGNIFICANT CHANGE UP (ref 32–36)
MCV RBC AUTO: 90.3 FL — SIGNIFICANT CHANGE UP (ref 80–100)
MONOCYTES # BLD AUTO: 0.9 K/UL — SIGNIFICANT CHANGE UP (ref 0–0.9)
MONOCYTES NFR BLD AUTO: 7.2 % — SIGNIFICANT CHANGE UP (ref 2–14)
NEUTROPHILS # BLD AUTO: 8.08 K/UL — HIGH (ref 1.8–7.4)
NEUTROPHILS NFR BLD AUTO: 64.3 % — SIGNIFICANT CHANGE UP (ref 43–77)
NRBC BLD AUTO-RTO: 0 /100 WBCS — SIGNIFICANT CHANGE UP (ref 0–0)
PHOSPHATE SERPL-MCNC: 1.6 MG/DL — LOW (ref 2.5–4.5)
PLATELET # BLD AUTO: 185 K/UL — SIGNIFICANT CHANGE UP (ref 150–400)
POTASSIUM SERPL-MCNC: 3.5 MMOL/L — SIGNIFICANT CHANGE UP (ref 3.5–5.3)
POTASSIUM SERPL-SCNC: 3.5 MMOL/L — SIGNIFICANT CHANGE UP (ref 3.5–5.3)
PROT SERPL-MCNC: 6.6 G/DL — SIGNIFICANT CHANGE UP (ref 6–8.3)
RBC # BLD: 2.88 M/UL — LOW (ref 4.2–5.8)
RBC # FLD: 15.7 % — HIGH (ref 10.3–14.5)
SODIUM SERPL-SCNC: 139 MMOL/L — SIGNIFICANT CHANGE UP (ref 135–145)
WBC # BLD: 12.57 K/UL — HIGH (ref 3.8–10.5)
WBC # FLD AUTO: 12.57 K/UL — HIGH (ref 3.8–10.5)

## 2025-05-09 PROCEDURE — 80307 DRUG TEST PRSMV CHEM ANLYZR: CPT

## 2025-05-09 PROCEDURE — 84100 ASSAY OF PHOSPHORUS: CPT

## 2025-05-09 PROCEDURE — 85027 COMPLETE CBC AUTOMATED: CPT

## 2025-05-09 PROCEDURE — 83605 ASSAY OF LACTIC ACID: CPT

## 2025-05-09 PROCEDURE — 84156 ASSAY OF PROTEIN URINE: CPT

## 2025-05-09 PROCEDURE — 86923 COMPATIBILITY TEST ELECTRIC: CPT

## 2025-05-09 PROCEDURE — 99285 EMERGENCY DEPT VISIT HI MDM: CPT

## 2025-05-09 PROCEDURE — 81001 URINALYSIS AUTO W/SCOPE: CPT

## 2025-05-09 PROCEDURE — 73630 X-RAY EXAM OF FOOT: CPT

## 2025-05-09 PROCEDURE — P9016: CPT

## 2025-05-09 PROCEDURE — 85025 COMPLETE CBC W/AUTO DIFF WBC: CPT

## 2025-05-09 PROCEDURE — 73600 X-RAY EXAM OF ANKLE: CPT

## 2025-05-09 PROCEDURE — 76705 ECHO EXAM OF ABDOMEN: CPT

## 2025-05-09 PROCEDURE — 82010 KETONE BODYS QUAN: CPT

## 2025-05-09 PROCEDURE — 83735 ASSAY OF MAGNESIUM: CPT

## 2025-05-09 PROCEDURE — 87040 BLOOD CULTURE FOR BACTERIA: CPT

## 2025-05-09 PROCEDURE — 83550 IRON BINDING TEST: CPT

## 2025-05-09 PROCEDURE — 83540 ASSAY OF IRON: CPT

## 2025-05-09 PROCEDURE — 82570 ASSAY OF URINE CREATININE: CPT

## 2025-05-09 PROCEDURE — 83930 ASSAY OF BLOOD OSMOLALITY: CPT

## 2025-05-09 PROCEDURE — 82962 GLUCOSE BLOOD TEST: CPT

## 2025-05-09 PROCEDURE — 80184 ASSAY OF PHENOBARBITAL: CPT

## 2025-05-09 PROCEDURE — 84540 ASSAY OF URINE/UREA-N: CPT

## 2025-05-09 PROCEDURE — 93005 ELECTROCARDIOGRAM TRACING: CPT

## 2025-05-09 PROCEDURE — 84133 ASSAY OF URINE POTASSIUM: CPT

## 2025-05-09 PROCEDURE — 36415 COLL VENOUS BLD VENIPUNCTURE: CPT

## 2025-05-09 PROCEDURE — C9399: CPT

## 2025-05-09 PROCEDURE — 84300 ASSAY OF URINE SODIUM: CPT

## 2025-05-09 PROCEDURE — 36430 TRANSFUSION BLD/BLD COMPNT: CPT

## 2025-05-09 PROCEDURE — 80053 COMPREHEN METABOLIC PANEL: CPT

## 2025-05-09 PROCEDURE — 99239 HOSP IP/OBS DSCHRG MGMT >30: CPT | Mod: GC

## 2025-05-09 PROCEDURE — 86900 BLOOD TYPING SEROLOGIC ABO: CPT

## 2025-05-09 PROCEDURE — 80048 BASIC METABOLIC PNL TOTAL CA: CPT

## 2025-05-09 PROCEDURE — 80074 ACUTE HEPATITIS PANEL: CPT

## 2025-05-09 PROCEDURE — 86850 RBC ANTIBODY SCREEN: CPT

## 2025-05-09 PROCEDURE — 99232 SBSQ HOSP IP/OBS MODERATE 35: CPT | Mod: GC

## 2025-05-09 PROCEDURE — 86901 BLOOD TYPING SEROLOGIC RH(D): CPT

## 2025-05-09 PROCEDURE — 82803 BLOOD GASES ANY COMBINATION: CPT

## 2025-05-09 PROCEDURE — 96365 THER/PROPH/DIAG IV INF INIT: CPT

## 2025-05-09 PROCEDURE — 93306 TTE W/DOPPLER COMPLETE: CPT

## 2025-05-09 PROCEDURE — 82728 ASSAY OF FERRITIN: CPT

## 2025-05-09 PROCEDURE — 85730 THROMBOPLASTIN TIME PARTIAL: CPT

## 2025-05-09 PROCEDURE — 85610 PROTHROMBIN TIME: CPT

## 2025-05-09 PROCEDURE — 96375 TX/PRO/DX INJ NEW DRUG ADDON: CPT

## 2025-05-09 RX ORDER — SUCRALFATE 1 G
1 TABLET ORAL
Qty: 56 | Refills: 0
Start: 2025-05-09 | End: 2025-05-22

## 2025-05-09 RX ORDER — CEFPODOXIME PROXETIL 200 MG/1
1 TABLET, FILM COATED ORAL
Qty: 6 | Refills: 0
Start: 2025-05-09 | End: 2025-05-11

## 2025-05-09 RX ORDER — POTASSIUM PHOSPHATE, MONOBASIC POTASSIUM PHOSPHATE, DIBASIC INJECTION, 236; 224 MG/ML; MG/ML
30 SOLUTION, CONCENTRATE INTRAVENOUS ONCE
Refills: 0 | Status: COMPLETED | OUTPATIENT
Start: 2025-05-09 | End: 2025-05-09

## 2025-05-09 RX ORDER — ACAMPROSATE CALCIUM ENTERIC-COATED 333 MG/1
2 TABLET, DELAYED RELEASE ORAL
Qty: 180 | Refills: 0
Start: 2025-05-09 | End: 2025-06-07

## 2025-05-09 RX ORDER — B1/B2/B3/B5/B6/B12/VIT C/FOLIC 500-0.5 MG
1 TABLET ORAL
Qty: 0 | Refills: 0 | DISCHARGE
Start: 2025-05-09

## 2025-05-09 RX ORDER — SUCRALFATE 1 G
1 TABLET ORAL
Qty: 0 | Refills: 0 | DISCHARGE
Start: 2025-05-09

## 2025-05-09 RX ORDER — SODIUM PHOSPHATE,DIBASIC DIHYD
30 POWDER (GRAM) MISCELLANEOUS ONCE
Refills: 0 | Status: DISCONTINUED | OUTPATIENT
Start: 2025-05-09 | End: 2025-05-09

## 2025-05-09 RX ORDER — LOSARTAN POTASSIUM 100 MG/1
25 TABLET, FILM COATED ORAL DAILY
Refills: 0 | Status: DISCONTINUED | OUTPATIENT
Start: 2025-05-09 | End: 2025-05-09

## 2025-05-09 RX ADMIN — Medication 105 MILLIGRAM(S): at 05:05

## 2025-05-09 RX ADMIN — Medication 1 GRAM(S): at 05:04

## 2025-05-09 RX ADMIN — FOLIC ACID 1 MILLIGRAM(S): 1 TABLET ORAL at 09:21

## 2025-05-09 RX ADMIN — CEFTRIAXONE 100 MILLIGRAM(S): 500 INJECTION, POWDER, FOR SOLUTION INTRAMUSCULAR; INTRAVENOUS at 11:35

## 2025-05-09 RX ADMIN — Medication 1 GRAM(S): at 11:35

## 2025-05-09 RX ADMIN — Medication 40 MILLIGRAM(S): at 07:01

## 2025-05-09 RX ADMIN — Medication 1 TABLET(S): at 11:35

## 2025-05-09 RX ADMIN — POTASSIUM PHOSPHATE, MONOBASIC POTASSIUM PHOSPHATE, DIBASIC INJECTION, 83.33 MILLIMOLE(S): 236; 224 SOLUTION, CONCENTRATE INTRAVENOUS at 12:46

## 2025-05-09 RX ADMIN — LOSARTAN POTASSIUM 25 MILLIGRAM(S): 100 TABLET, FILM COATED ORAL at 10:03

## 2025-05-09 RX ADMIN — Medication 100 MILLIGRAM(S): at 11:35

## 2025-05-09 NOTE — PROGRESS NOTE ADULT - PROBLEM SELECTOR PLAN 11
F: as needed  E: Replete as needed, target K>4, Phos>3, Mg>2  N: NPO  DVT pptx: none  GI pptx: pantoprazole  Code status: full code
F: as needed  E: Replete as needed, target K>4, Phos>3, Mg>2  N: NPO  DVT pptx: none  GI pptx: pantoprazole  Code status: full code

## 2025-05-09 NOTE — PROGRESS NOTE ADULT - PROBLEM SELECTOR PLAN 4
T101.7, HR 120s, WBC 13.31 -- 3/4 SIRS met, lactate 6.2  Pt endorsing a few episode of diarrhea PTA, however diarrhea is likely from ongoing UGIB  No cough, CP, SOB, vomiting, further episodes of diarrhea  Likely fever from sympathetic stimulation iso active withdrawal vs aspiration pneumonitis/PNA vs SBP iso likely alcoholic liver dx; less likely other sources of infection  UA negative, abdominal US w/ hepatic steatosis  - c/w CTX 2g qd for management of aspiration PNA and SBP (5/7 -  - f/u BCx   - f/u UCx
T101.7, HR 120s, WBC 13.31 -- 3/4 SIRS met, lactate 6.2  Pt endorsing a few episode of diarrhea PTA, however diarrhea is likely from ongoing UGIB  No cough, CP, SOB, vomiting, further episodes of diarrhea  Likely fever from sympathetic stimulation iso active withdrawal vs aspiration pneumonitis/PNA vs SBP iso likely alcoholic liver dx; less likely other sources of infection  UA negative, abdominal US w/ hepatic steatosis  - c/w CTX 1g qd for management of aspiration PNA and SBP (5/7 -  - Discharge w/ cefpodoxime x 5 day total course including the CTX   - f/u BCx -> no growth at 24 hours   - f/u UCx -> UA negative

## 2025-05-09 NOTE — DISCHARGE NOTE PROVIDER - NSDCMRMEDTOKEN_GEN_ALL_CORE_FT
elderberry 350 mg oral capsule: 1 cap(s) orally once a day  losartan 25 mg oral tablet: 1 tab(s) orally once a day   elderberry 350 mg oral capsule: 1 cap(s) orally once a day  losartan 25 mg oral tablet: 1 tab(s) orally once a day  Multiple Vitamins oral tablet: 1 tab(s) orally once a day  pantoprazole 40 mg oral delayed release tablet: 1 tab(s) orally every 12 hours  pantoprazole 40 mg oral delayed release tablet: 1 tab(s) orally every 12 hours  sucralfate 1 g oral tablet: 1 tab(s) orally every 6 hours  sucralfate 1 g oral tablet: 1 tab(s) orally every 6 hours  thiamine 100 mg oral tablet: 1 tab(s) orally every 24 hours   acamprosate 333 mg oral delayed release tablet: 2 tab(s) orally every 8 hours  cefpodoxime 200 mg oral tablet: 1 tab(s) orally every 12 hours  elderberry 350 mg oral capsule: 1 cap(s) orally once a day  losartan 25 mg oral tablet: 1 tab(s) orally once a day  Multiple Vitamins oral tablet: 1 tab(s) orally once a day  pantoprazole 40 mg oral delayed release tablet: 1 tab(s) orally every 12 hours  pantoprazole 40 mg oral delayed release tablet: 1 tab(s) orally every 12 hours  sucralfate 1 g oral tablet: 1 tab(s) orally every 6 hours  sucralfate 1 g oral tablet: 1 tab(s) orally every 6 hours  thiamine 100 mg oral tablet: 1 tab(s) orally every 24 hours

## 2025-05-09 NOTE — DISCHARGE NOTE NURSING/CASE MANAGEMENT/SOCIAL WORK - FINANCIAL ASSISTANCE
[facility] provides services at a reduced cost to those who are determined to be eligible through [facility]’s financial assistance program. Information regarding [facility]’s financial assistance program can be found by going to [email or web address] or by calling [phone].

## 2025-05-09 NOTE — PROGRESS NOTE ADULT - PROBLEM SELECTOR PLAN 9
Na 127 at admission > now 130  Given alcohol use hx and likely alcoholic liver dx, likely hypotonic hypovolemic hyponatremia  Serum osm 278, urine Na 27   - urine osm  - encourage PO intake  - daily BMP
Na 127 at admission > now 130  Given alcohol use hx and likely alcoholic liver dx, likely hypotonic hypovolemic hyponatremia  Serum osm 278, urine Na 27   - urine osm  - encourage PO intake  - daily BMP

## 2025-05-09 NOTE — DISCHARGE NOTE PROVIDER - NSDCCPTREATMENT_GEN_ALL_CORE_FT
PRINCIPAL PROCEDURE  Procedure: Ultrasound of liver  Findings and Treatment: (1) Echo 5/7/25   1. Left ventricular cavity is normal in size. Left ventricular wall thickness is normal. Left ventricular systolic function is normal with an ejection fraction of 65 % by Valente's method of disks. There are no regional wall motion abnormalities seen.   2. Normal right ventricular cavity size, with normal wall thickness, and probably normal right ventricular systolic function.   3. Normal left and right atrial size.   4. No significant valvular disease.   5. Pulmonary artery systolic pressure could not be estimated.   6. No pericardial effusion seen.   7. No prior echocardiogram is available for comparison.  (2) US Abdomen 5/7/25  Conclusion: Large fatty liver

## 2025-05-09 NOTE — DISCHARGE NOTE PROVIDER - CARE PROVIDER_API CALL
Isael Lang  [facility]—[address], [address], [place] [address]  Phone: [phone]  Fax: (   )    -  Established Patient  Follow Up Time: 2 weeks

## 2025-05-09 NOTE — DISCHARGE NOTE NURSING/CASE MANAGEMENT/SOCIAL WORK - NSDCPEEMAIL_GEN_ALL_CORE
[facility] for Tobacco Control email [email or web address]

## 2025-05-09 NOTE — DISCHARGE NOTE PROVIDER - NSDCCPCAREPLAN_GEN_ALL_CORE_FT
PRINCIPAL DISCHARGE DIAGNOSIS  Diagnosis: Alcohol dependence with withdrawal  Assessment and Plan of Treatment: You were hospitalized to manage your alcohol withdrawal. You are not out of the withdrawal period and are tolerating eating a full diet and are thus medically ready for discharge. Please take the following medications and follow up with your PCP.   (1) Cefpodoxime 200mg every 12 hours for 3 days (last day 5/13/25)  (2) Acamprosate 666mg every 8 hours (this will help with alcohol cessation)  (3) protonix 40 mg every morning   (4) carafate suspension 1g four times per day   Should you experience any symptoms such as tremors, anxiety, headaches, nausea, or vomiting, this may be related to alcohol withdrawal and you should immediately seek medically care at this facility or any other nearby medical facility.   Your liver function tests were slightly abnormal and your liver ultrasound showed a fatty liver. This is likely related to your alcohol intake. Please focus on alcohol cessation and follow up with your primary care doctor to ensure your liver function tests return to normal.  Your right ankle did not have any fracture on the x-rays. Please take tylenol over the counter as needed for pain relief. Return to medical care if your pain worsens.

## 2025-05-09 NOTE — PROGRESS NOTE ADULT - ATTENDING COMMENTS
Patient seen, examined, and discussed with Dr. Lin. Agree with above. 44M with alcohol use disorder who gastroenterology has been consulted for anemia and dark stool. Presented to [facility] 5/7/25 where he was subsequently transferred to [facility] for further evaluation and management of withdrawal symptoms. Also noted to have new anemia with hemoglobin from 3/2025 15.6 to admission 7.1. S/p EGD with severe esophagitis. Cont PPI BID and carafate suspension. Outpatient follow up.     Deon Rico MD  Gastroenterology

## 2025-05-09 NOTE — DISCHARGE NOTE NURSING/CASE MANAGEMENT/SOCIAL WORK - PATIENT PORTAL LINK FT
You can access the FollowMyHealth Patient Portal offered by [facility] by registering at the following website: [email or web address]. By joining [employer]’s FollowMyHealth portal, you will also be able to view your health information using other applications (apps) compatible with our system.

## 2025-05-09 NOTE — PROGRESS NOTE ADULT - SUBJECTIVE AND OBJECTIVE BOX
GASTROENTEROLOGY PROGRESS NOTE    INTERVAL/SUBJECTIVE:  Feels well today. Denies hematochezia or melena.     Allergies    No Known Allergies    Intolerances      MEDICATIONS:  MEDICATIONS  (STANDING):  cefTRIAXone   IVPB 2000 milliGRAM(s) IV Intermittent <User Schedule>  folic acid Injectable 1 milliGRAM(s) IV Push every 24 hours  multivitamin 1 Tablet(s) Oral daily  pantoprazole    Tablet 40 milliGRAM(s) Oral every 12 hours  sucralfate 1 Gram(s) Oral every 6 hours  thiamine IVPB 500 milliGRAM(s) IV Intermittent every 8 hours    MEDICATIONS  (PRN):  PHENobarbital Injectable 130 milliGRAM(s) IV Push every 15 minutes PRN Increasing CIWA    Vital Signs Last 24 Hrs  T(C): 36.6 (09 May 2025 05:30), Max: 37.8 (08 May 2025 14:42)  T(F): 97.9 (09 May 2025 05:30), Max: 100 (08 May 2025 14:42)  HR: 89 (09 May 2025 05:30) (87 - 100)  BP: 121/76 (09 May 2025 05:30) (121/76 - 163/75)  BP(mean): 108 (08 May 2025 20:57) (87 - 108)  RR: 19 (09 May 2025 05:30) (16 - 19)  SpO2: 100% (09 May 2025 05:30) (97% - 100%)    Parameters below as of 09 May 2025 05:30  Patient On (Oxygen Delivery Method): room air        05-08 @ 07:01  -  05-09 @ 07:00  --------------------------------------------------------  IN: 40 mL / OUT: 1000 mL / NET: -960 mL        General: Young male, sitting at bedside, awake, well developed, well nourished, in no acute distress  Eyes: Anicteric sclerae, moist conjunctivae, extraocular motions intact, pupils equal round and reactive to light  HENT: Pink and moist mucous membranes, good dentition, tongue normal in appearance without lesions and has symmetrical movement, no obvious oral lesions noted, pharynx normal without tonsillar swelling or exudates  Abdomen: Symmetric appearing, no obvious lesions, distended, normal bowel sounds, soft, non-tender to palpation, no rebound or guarding  Skin: Warm and dry, no obvious rash, no jaundice      LABS:                        8.7    12.57 )-----------( 185      ( 09 May 2025 06:51 )             26.0     05-09    139  |  99  |  7   ----------------------------<  99  3.5   |  25  |  0.55    Ca    7.8[L]      09 May 2025 06:51  Phos  1.6     05-09  Mg     2.2     05-09    TPro  6.6  /  Alb  3.6  /  TBili  0.4  /  DBili  x   /  AST  140[H]  /  ALT  95[H]  /  AlkPhos  65  05-09    PT/INR - ( 08 May 2025 06:05 )   PT: 11.6 sec;   INR: 1.01          PTT - ( 08 May 2025 06:05 )  PTT:23.9 sec    RADIOLOGY & ADDITIONAL STUDIES: Reviewed

## 2025-05-09 NOTE — DISCHARGE NOTE PROVIDER - PROVIDER TOKENS
FREE:[LAST:[Vera],FIRST:[Isael],PHONE:[phone]],FAX:[(   )    -],ADDRESS:[[facility]—[address] [place] [address]],FOLLOWUP:[2 weeks],ESTABLISHEDPATIENT:[T]]

## 2025-05-09 NOTE — PROGRESS NOTE ADULT - PROBLEM SELECTOR PLAN 1
CIWA 16 on admission with anxiety, tremors, nausea, agitation  No prior hx of seizures, intubations, or DT but was admitted 1 year ago for similar presentation   >> <10, BHB 1.5  s/p librium 50mg x1, valium 10mg x2, phenobarb 650mg x1  s/p phenobarb load - 650/520/390mg -- level 20.2  - [facility] protocol in place -- last drink 5/4 in PM  - SBIRT  - Folic acid 1mg IVP qd -> transition to PO if patient does not discharge   - d/c Thiamine 500mg IVPB qd   - Start Thiamine 100mg PO q24 hours   - Aspiration precautions
CIWA 16 on admission with anxiety, tremors, nausea, agitation  No prior hx of seizures, intubations, or DT but was admitted 1 year ago for similar presentation   >> <10, BHB 1.5  s/p librium 50mg x1, valium 10mg x2, phenobarb 650mg x1  s/p phenobarb load - 650/520/390mg -- level 20.2  - [facility] protocol in place -- pt within 96 hour window until 5/11 evening  - SBIRT  - Folic acid 1mg IVP qd  - Thiamine 500mg IVPB qd x 7 days  - Aspiration precautions
None known

## 2025-05-09 NOTE — PROGRESS NOTE ADULT - TIME BILLING
Time spent includes direct patient care  (interview and examination of patient), discussion with other providers, support staff and/or patient's family members, review of medical records, ordering diagnostic tests and analyzing results, and documentation.
I rounded on patient in the morning and afternoon  and case discussed at length with team, chart reviewed, imaging reviewed, and all laboratory data. CIWA 0-2 and no additional phenobarbital overnight and level 20. GI EGD with esophagitis and Hb stable. Continue PPI, d/c Octreotide and advance diet. Pt has rehab services and support set up already as outpt. continue 5 days Ceftriaxone, or transition to Cefpodoxime for possible aspiration on admission and fevers.

## 2025-05-09 NOTE — PROGRESS NOTE ADULT - ASSESSMENT
44M PMHx of HTN (on losartan at home) here for alcohol withdrawal s/p phenobarbital load with no further PRN needs, ccb HAGMA, c/f upper GI bleed, transaminitis, and ankle pain. Now medically ready for discharge.

## 2025-05-09 NOTE — DISCHARGE NOTE PROVIDER - HOSPITAL COURSE
#Discharge: do not delete    Hospital Course:   44M with PMHx of HTN who initially presented with anxiety and tremors with concern for alcohol withdrawal who was initially admitted to telemetry for management with phenobarbital and HAGMA 2/2 lactic acidosis iso likely UGIB. Hgb 7.7>6.7 requiring 2 units pRBC , lactate 6.2>1.5 s/p 5L LR. Given phenobarb load (650/520/390), level 20.6 without further pushes needed. Started on octreotide gtt for possible varices iso active GIB and CTX for concern for aspiration PNA. Patient with pain in R foot, but with unclear history of trauma, R foot xrays negative for fx. EGD on 5/8 showing grade D esophagitis, gastropathy, and duodenopathy and without further episodes of melena. Patient is medically stable, able to tolerate a diet, out of the withdrawal window (last drink 5/4/25) and ready for discharge.    Problem List/Main Diagnoses (system-based):   #Alcohol dependence with withdrawal   - s/p phenobarbital load   - No further PRN phenobarital required during hospitalization  - Last drink 5/4/25 (patient now outside of the withdrawal window)   - Start acamprosate 666mg TID for help with alcohol cessation    #Symptomatic Anemia   #Upper GI bleed   - EGD with Grade D esophagitis in the mid and lower third of esophagus   - Patient to continue with protonix 40 mg PO qAM on discharge   - Patient to continue with carafate suspension 1g PO QID on discharge     #Severe Sepsis 2/2 #PNA   - Present on admissions with 3 of 4 SIRS criteria being met (Temp, HR, WBC count) with lactate elevated   - s/p CTX 1g q24 hours x 2 days  - c/w Cefpodoxime     #HTN  #Transaminitis   #High anion gap metabolic acidosis   #Hyponatremia #Accident due to a mechanical fall without injury     Patient was discharged to: Home    New medications:   Changes to old medications:   Medications that were stopped:  Items to Follow up as outpatient   Physical exam at time of discharge: #Discharge: do not delete    Hospital Course:   44M with PMHx of HTN who initially presented with anxiety and tremors with concern for alcohol withdrawal who was initially admitted to telemetry for management with phenobarbital and HAGMA 2/2 lactic acidosis iso likely UGIB. Hgb 7.7>6.7 requiring 2 units pRBC , lactate 6.2>1.5 s/p 5L LR. Given phenobarb load (650/520/390), level 20.6 without further pushes needed. Started on octreotide gtt for possible varices iso active GIB and CTX for concern for aspiration PNA. Patient with pain in R foot, but with unclear history of trauma, R foot xrays negative for fx. EGD on 5/8 showing grade D esophagitis, gastropathy, and duodenopathy and without further episodes of melena. Patient is medically stable, able to tolerate a diet, out of the withdrawal window (last drink 5/4/25) and ready for discharge.    Problem List/Main Diagnoses (system-based):   #Alcohol dependence with withdrawal   - s/p phenobarbital load   - No further PRN phenobarital required during hospitalization  - Last drink 5/4/25 (patient now outside of the withdrawal window)   - Start acamprosate 666mg TID for help with alcohol cessation    #Symptomatic Anemia   #Upper GI bleed   - EGD with Grade D esophagitis in the mid and lower third of esophagus   - Patient to continue with protonix 40 mg PO qAM on discharge   - Patient to continue with carafate suspension 1g PO QID on discharge     #Severe Sepsis 2/2 #PNA   - Present on admissions with 3 of 4 SIRS criteria being met (Temp, HR, WBC count) with lactate elevated   - s/p CTX 1g q24 hours x 2 days  - c/w Cefpodoxime 200mg q12 hours x 5 days (EOT 5/13/25)    #HTN  - Patient should re-start with home  medication losartan 25 mg PO q24 hours     #Transaminitis   - Patient with mildly elevated LFTs on CMP with no evidence of ascites on POCUS and no prior liver history  - Abdominal ultrasound with hepatic steatosis  - Hepatic panel negative   - Most recently 5/9/25: , ALT 95  - Please follow up outpatient to ensure LFTs downtrended     #High anion gap metabolic acidosis   - Resolved    #Hyponatremia  - Resolved, given alcohol use history and likely alcoholic liver disease, likely hypotonic hypovolemic hyponatremia      #Accident due to a mechanical fall without injury   - On admission patient endorsed a fall on the stairs several days ago and endorses injuring his R ankle   - R ankle x-ray negative  - Patient can use Tylenol OTC as needed for pain management     Patient was discharged to: Home    New medications:   (1) Cefpodoxime 200mg q12 hours x 5 days (EOT 5/13/25)  (2) Acamprosate 666mg TID provided 1 month, please follow up outpatient   (3) protonix 40 mg PO qAM x 2 weeks  (4) carafate suspension 1g PO QID x2 weeks     Changes to old medications: None     Medications that were stopped: None    Items to Follow up as outpatient:  - Alcohol cessation, Rx'd acamprosate, assess compliance and further prescription outpatient   - Elevated LFTs (see above for details)     Physical exam at time of discharge:   T(C): 36.9 (05-09-25 @ 09:21), Max: 37.8 (05-08-25 @ 14:42)  HR: 100 (05-09-25 @ 09:21) (87 - 100)  BP: 149/95 (05-09-25 @ 09:21) (121/76 - 163/75)  RR: 18 (05-09-25 @ 09:21) (16 - 19)  SpO2: 99% (05-09-25 @ 09:21) (97% - 100%)    Physical Exam on Discharge   CONSTITUTIONAL: Well groomed, no apparent distress  EYES: PERRLA and symmetric, EOMI, No conjunctival or scleral injection, non-icteric  ENMT: Oral mucosa with moist membranes. Normal dentition; no pharyngeal injection or exudates  NECK: Supple, symmetric and without tracheal deviation   RESP: No respiratory distress, no use of accessory muscles; CTA b/l, no WRR  CV: RRR, +S1S2, no MRG; no JVD; no peripheral edema  GI: Soft, NT, ND, no rebound, no guarding; no palpable masses; no hepatosplenomegaly; no hernia palpated  LYMPH: No cervical LAD or tenderness; no axillary LAD or tenderness; no inguinal LAD or tenderness  MSK: Normal gait  NEURO: AOx3, no focal deficits, moves all four extremities spontaneously   Extremities: WWP; 2+ peripheral pulses bilaterally; ; trace nonpitting LE edema. Mild TTP along lateral malleolus R  PSYCH: Appropriate insight/judgment; A+O x 3, mood and affect appropriate, recent/remote memory intact    Imaging:  (1) Echo 5/7/25   1. Left ventricular cavity is normal in size. Left ventricular wall thickness is normal. Left ventricular systolic function is normal with an ejection fraction of 65 % by Valente's method of disks. There are no regional wall motion abnormalities seen.   2. Normal right ventricular cavity size, with normal wall thickness, and probably normal right ventricular systolic function.   3. Normal left and right atrial size.   4. No significant valvular disease.   5. Pulmonary artery systolic pressure could not be estimated.   6. No pericardial effusion seen.   7. No prior echocardiogram is available for comparison.    (2) US Abdomen 5/7/25  Conclusion: Large fatty liver

## 2025-05-09 NOTE — SBIRT NOTE ADULT - NSSBIRTBRIEFINTDET_GEN_A_CORE
SW and patient discussed patient's alcohol usage practices. Patient reported that he has a long history of alcohol abuse and completed rehabilitation treatment in his twenties. Patient reported he was in sobriety for the past year and relapsed due to environment stressors. Patient reported he will follow up with his therapist to discuss his relapse. Patient reported his protective factors and denied resources.

## 2025-05-09 NOTE — PROGRESS NOTE ADULT - PROBLEM SELECTOR PLAN 10
Pt endorsing falling on the stairs several days PTA and injuring his ankle  R foot, ankle xrays negative for fx  - pain management with tylenol, lidocaine patches
Pt endorsing falling on the stairs several days PTA and injuring his ankle  R foot, ankle xrays negative for fx  - pain management with tylenol, lidocaine patches

## 2025-05-09 NOTE — PROGRESS NOTE ADULT - PROBLEM SELECTOR PLAN 2
Baseline Hgb 15 in 2024; Hgb 7.7 > 7.1 on admission with melenic stools  Likely acute blood loss anemia 2/2 UGIB and possible esophageal varices, however will also assess for other sources given no prior hx of EGD, colonoscopy   s/p 2 units pRBC >> Hgb 8.5  Iron studies with total 226, %sat 86, ferritin 461   - monitor for signs of bleeding  - T&S x2  - transfuse for Hgb <7
Baseline Hgb 15 in 2024; Hgb 7.7 > 7.1 on admission with melenic stools  Likely acute blood loss anemia 2/2 UGIB and possible esophageal varices, however will also assess for other sources given no prior hx of EGD, colonoscopy   s/p 2 units pRBC >> Hgb 8.5  Iron studies with total 226, %sat 86, ferritin 461   - monitor for signs of bleeding  - T&S x2  - transfuse for Hgb <7

## 2025-05-09 NOTE — PROGRESS NOTE ADULT - PROBLEM SELECTOR PLAN 8
Resolved   Lactate 6.2, Bicarb 12, AG 27; ABG 7.42/27/18 -- HAGMA w/ respiratory alkalosis  ? If degree of AG however is fully explained by the lactate  Calculated serum osm 307 -- if measured serum osm is greater, then there may be another solute not accounted for (e.g. methanol, ethylene glycol, etc)  Lactate 1.5 s/p 5L IVF   >> <10; salicylate, acetaminophen negative; Utox negative  Serum osm 278, less likely acute toxic ingestion of other alcohols  UA +ketones -- likely HAGMA 2/2 lactic acidosis and starvation ketosis
Resolved   Lactate 6.2, Bicarb 12, AG 27; ABG 7.42/27/18 -- HAGMA w/ respiratory alkalosis  ? If degree of AG however is fully explained by the lactate  Calculated serum osm 307 -- if measured serum osm is greater, then there may be another solute not accounted for (e.g. methanol, ethylene glycol, etc)  Lactate 1.5 s/p 5L IVF   >> <10; salicylate, acetaminophen negative; Utox negative  Serum osm 278, less likely acute toxic ingestion of other alcohols  UA +ketones -- likely HAGMA 2/2 lactic acidosis and starvation ketosis

## 2025-05-09 NOTE — PROGRESS NOTE ADULT - PROBLEM SELECTOR PLAN 7
Moderate abdominal distention on exam, no evidence of ascites on POCUS  No prior liver hx  Abdominal US with hepatic steatosis  Hepatitis panel negative  - trend LFTs  - GI consulted, appreciate recs
Moderate abdominal distention on exam, no evidence of ascites on POCUS  No prior liver hx  Abdominal US with hepatic steatosis  Hepatitis panel negative  - trend LFTs  - GI consulted, appreciate recs

## 2025-05-09 NOTE — PROGRESS NOTE ADULT - PROBLEM SELECTOR PLAN 6
home med: losartan 25mg   - hold home med for now iso severe sepsis and acute UGIB  - restart when appropriate
home med: losartan 25mg   - hold home med for now iso severe sepsis and acute UGIB  - restart when appropriate or on discharge

## 2025-05-09 NOTE — PROGRESS NOTE ADULT - ASSESSMENT
This is a 44 year old male with alcohol use disorder who gastroenterology has been consulted for anemia and dark stool. Presented to [facility] 5/7/25 where he was subsequently transferred to [facility] for further evaluation and management of withdrawal symptoms. Also noted to have new anemia with hemoglobin from 3/2025 15.6 and today 7.1.     5/8/25 EGD  Impressions:  	Grade D esophagitis in the mid and lower third of the esophagus compatible with nonspecific erosive esophagitis.  	Erythema in the whole stomach compatible with gastropathy.  	Otherwise normal stomach. .  	Esophageal hiatal hernia.  	Erythema in the duodenum compatible with duodenopathy.    5/7/25 abdominal ultrasound: Hepatomegaly, with length of 19.8 cm. Increased echogenicity, consistent with hepatic steatosis. No focal hepatic lesions are identified.    #LA grade D esophagitis  #Acute hemodynamically stable normocytic anemia secondary to the above, stable  - Likely the cause of patient's presenting complaints  - Esophagitis likely related to ongoing alcohol use  - Pantoprazole 40mg orally q12 hours   - Carafate Suspension 1g po qid  - Regular diet as tolerated    #Alcohol use disorder  #Fatty liver on 5/7/25 ultrasound  #Alcohol withdrawal, resolved  - Alcohol cessation  - Fatty liver on ultrasound  - Patient needs to establish care with gastroenterology regarding fatty liver management    No obvious contraindication for discharge from GI perspective. Will sign off for now. Please reconsult if needed.     Shant Lin DO  Gastroenterology Fellow  GI Consult Pager Weekdays 7am-5pm: [phone]  Weeknights/Weekend/Holiday Coverage: Please Call the  for Contact Information  Follow Up Questions Welcome via [place] Microsoft Teams Messenger

## 2025-05-09 NOTE — DISCHARGE NOTE NURSING/CASE MANAGEMENT/SOCIAL WORK - NSDCPEWEB_GEN_ALL_CORE
[facility] for Tobacco Control website --- [email or web address] website --- [email or web address]

## 2025-05-09 NOTE — PROGRESS NOTE ADULT - SUBJECTIVE AND OBJECTIVE BOX
06/01/15    CHOLECYSTECTOMY  1990    CORONARY ARTERY BYPASS GRAFT  05/28/15    X 3- Dr Nanette Mcnally CABELLO-LAD<SVG-Diag, SVG-PDA, MV Repair with 30 min CE IMR ring    CYSTOSCOPY Left 11/08/2018    HYSTERECTOMY  1990    KIDNEY STONE SURGERY Left 2010    MITRAL VALVE SURGERY  6/4/15    NM CYSTO/URETERO W/LITHOTRIPSY &INDWELL STENT INSRT Left 11/8/2018    CYSTOSCOPY URETEROSCOPY performed by Skylar Durán MD at [facility] STENT INSRT Left 11/8/2018    CYSTOSCOPY STENT INSERTION performed by Skylar Durán MD at [facility] STENT INSRT Left 11/15/2018    LEFT CYSTOSCOPY URETEROSCOPY LASER,HLL, LEFT STENT EXCHANGE performed by Skylar Durán MD at [place] [address]          Medications:       Prior to Admission medications    Medication Sig Start Date End Date Taking? Authorizing Provider   gabapentin (NEURONTIN) 400 MG capsule Take 1 capsule by mouth 3 times daily as needed (neuropathy) for up to 90 days.  6/26/19 9/24/19 Yes Grace Tipton MD   furosemide (LASIX) 40 MG tablet Take 1 tablet by mouth daily 6/17/19  Yes MICKEY Toussaint CNP   Insulin Pen Needle 32G X 4 MM MISC 1 each by Does not apply route daily 6/17/19  Yes MICKEY Toussaint CNP   atorvastatin (LIPITOR) 80 MG tablet take 1 tablet by mouth once daily 5/6/19  Yes Grace Tipton MD   clopidogrel (PLAVIX) 75 MG tablet take 1 tablet by mouth daily 4/26/19  Yes Grace Tipton MD   insulin glargine [facility]) 100 UNIT/ML injection pen inject 40 units subcutaneously every morning and 20 units every evening 4/22/19  Yes Wu Munguia DO   midodrine (PROAMATINE) 5 MG tablet Take 1 tablet by mouth 3 times daily 4/3/19  Yes Grace Tipton MD   lisinopril (PRINIVIL;ZESTRIL) 5 MG tablet Take 1 tablet by mouth daily 1/3/19  Yes Grace Tipton MD   lactobacillus (CULTURELLE) capsule Take 1 capsule by mouth 2 times daily (with meals) 01/01/2019     01/01/2019    MPV NOT REPORTED 01/01/2019     Lab Results   Component Value Date    TSH 1.13 11/04/2018     Lab Results   Component Value Date    CHOL 253 11/05/2018    HDL 34 11/05/2018    LABA1C 6.9 04/03/2019          Assessment/Plan:        1. DM (diabetes mellitus), type 2 with neurological complications ([facility] [address]  Pt may try to increase neurontin to 400mg one po BID and if still not enough may try 400mg one po TID. Pt to keep sugars and BP controlled. Pt encouraged to exercise by walking more. Solmari Roldan received counseling on the following healthy behaviors: nutrition and exercise  Reviewed prior labs and health maintenance  Continue current medications, diet and exercise. Discussed use, benefit, and side effects of prescribed medications. Barriers to medication compliance addressed. Patient given educational materials - see patient instructions  Was a self-tracking handout given in paper form or via [employer]? Yes    Requested Prescriptions     Signed Prescriptions Disp Refills    gabapentin (NEURONTIN) 400 MG capsule 90 capsule 2     Sig: Take 1 capsule by mouth 3 times daily as needed (neuropathy) for up to 90 days. All patient questions answered. Patient voiced understanding. Quality Measures    Body mass index is 26.79 kg/m². Elevated. Weight control planned discussed Healthy diet and regular exercise. BP: 120/74 Blood pressure is normal. Treatment plan consists of No treatment change needed.     Lab Results   Component Value Date    LDLCHOLESTEROL      11/05/2018    LDLDIRECT 90 11/05/2018    (goal LDL reduction with dx if diabetes is 50% LDL reduction)      PHQ Scores 4/3/2019 9/24/2018 9/13/2017 4/28/2016   PHQ2 Score 0 0 0 0   PHQ9 Score 0 0 0 0     Interpretation of Total Score Depression Severity: 1-4 = Minimal depression, 5-9 = Mild depression, 10-14 = Moderate depression, 15-19 = Moderately severe depression, 20-27 = Severe depression      Return in about 3 months (around 9/26/2019) for 6mos check up.       Electronically signed by Az Hoffman MD on 6/26/2019 at 11:47 PM Internal Medicine Progress Note    Overnight Events:  CIWA x 3 no intervention performed.     Subjective:   Patient seen and examined at the bedside. Patient was awake and alert when I entered in the room, he was walking around. Patient endorsed hunger this morning and desire to advance diet. Re-stated that last drink was 5/4 (Sunday in PM). Ankle pain reduced. Denies nausea, vomiting, CP, HA, dizziness. CIWA 0 on pre-rounds this morning, no intervention performed.     OBJECTIVE:  Vital Signs Last 24 Hrs  T(C): 36.9 (09 May 2025 09:21), Max: 37.8 (08 May 2025 14:42)  T(F): 98.5 (09 May 2025 09:21), Max: 100 (08 May 2025 14:42)  HR: 100 (09 May 2025 09:21) (87 - 100)  BP: 149/95 (09 May 2025 09:21) (121/76 - 163/75)  BP(mean): 108 (08 May 2025 20:57) (87 - 108)  RR: 18 (09 May 2025 09:21) (16 - 19)  SpO2: 99% (09 May 2025 09:21) (97% - 100%)    Parameters below as of 09 May 2025 09:21  Patient On (Oxygen Delivery Method): room air      I&O's Detail    08 May 2025 07:01  -  09 May 2025 07:00  --------------------------------------------------------  IN:    Octreotide: 40 mL  Total IN: 40 mL    OUT:    Voided (mL): 1000 mL  Total OUT: 1000 mL    Total NET: -960 mL      Physical Exam   CONSTITUTIONAL: Well groomed, no apparent distress  EYES: PERRLA and symmetric, EOMI, No conjunctival or scleral injection, non-icteric  ENMT: Oral mucosa with moist membranes. Normal dentition; no pharyngeal injection or exudates  NECK: Supple, symmetric and without tracheal deviation   RESP: No respiratory distress, no use of accessory muscles; CTA b/l, no WRR  CV: RRR, +S1S2, no MRG; no JVD; no peripheral edema  GI: Soft, NT, ND, no rebound, no guarding; no palpable masses; no hepatosplenomegaly; no hernia palpated  LYMPH: No cervical LAD or tenderness; no axillary LAD or tenderness; no inguinal LAD or tenderness  MSK: Normal gait  NEURO: AOx3, no focal deficits, moves all four extremities spontaneously   Extremities: WWP; 2+ peripheral pulses bilaterally; ; trace nonpitting LE edema. Mild TTP along lateral malleolus R  PSYCH: Appropriate insight/judgment; A+O x 3, mood and affect appropriate, recent/remote memory intact    Medications:  MEDICATIONS  (STANDING):  cefTRIAXone   IVPB 2000 milliGRAM(s) IV Intermittent <User Schedule>  folic acid Injectable 1 milliGRAM(s) IV Push every 24 hours  losartan 25 milliGRAM(s) Oral daily  multivitamin 1 Tablet(s) Oral daily  pantoprazole    Tablet 40 milliGRAM(s) Oral every 12 hours  potassium phosphate IVPB 30 milliMole(s) IV Intermittent once  sucralfate 1 Gram(s) Oral every 6 hours  thiamine 100 milliGRAM(s) Oral every 24 hours    MEDICATIONS  (PRN):  PHENobarbital Injectable 130 milliGRAM(s) IV Push every 15 minutes PRN Increasing CIWA      Labs:                        8.7    12.57 )-----------( 185      ( 09 May 2025 06:51 )             26.0     05-09    139  |  99  |  7   ----------------------------<  99  3.5   |  25  |  0.55    Ca    7.8[L]      09 May 2025 06:51  Phos  1.6     05-09  Mg     2.2     05-09    TPro  6.6  /  Alb  3.6  /  TBili  0.4  /  DBili  x   /  AST  140[H]  /  ALT  95[H]  /  AlkPhos  65  05-09    PT/INR - ( 08 May 2025 06:05 )   PT: 11.6 sec;   INR: 1.01          PTT - ( 08 May 2025 06:05 )  PTT:23.9 sec    Urinalysis Basic - ( 09 May 2025 06:51 )    Color: x / Appearance: x / SG: x / pH: x  Gluc: 99 mg/dL / Ketone: x  / Bili: x / Urobili: x   Blood: x / Protein: x / Nitrite: x   Leuk Esterase: x / RBC: x / WBC x   Sq Epi: x / Non Sq Epi: x / Bacteria: x          Radiology: Reviewed

## 2025-05-09 NOTE — PROGRESS NOTE ADULT - PROBLEM SELECTOR PLAN 3
#Grade D esophagitis   Episode of black tarry stool at admission however pt reports hx of dark stools whenever he consumes alcohol. No prior EGD/colonscopy hx. Tachycardic to 120s on admission with 6.2 lactate  Abdominal US with hepatic steatosis]  s/p EGD significant for Grade D esophagitis in the mid and lower third of the esophagus s/p octreotide ggt given no evidence of varices   - c/w PPI 40mg po BID  - c/w carafate suspension 1g po QID
#Grade D esophagitis   Episode of black tarry stool at admission however pt reports hx of dark stools whenever he consumes alcohol. No prior EGD/colonscopy hx. Tachycardic to 120s on admission with 6.2 lactate  Abdominal US with hepatic steatosis]  s/p EGD significant for Grade D esophagitis in the mid and lower third of the esophagus s/p octreotide ggt given no evidence of varices   - c/w PPI 40mg po BID, continue on discharge   - c/w carafate suspension 1g po QID, continue on discharge

## 2025-05-19 DIAGNOSIS — F10.239 ALCOHOL DEPENDENCE WITH WITHDRAWAL, UNSPECIFIED: ICD-10-CM

## 2025-05-19 DIAGNOSIS — E87.3 ALKALOSIS: ICD-10-CM

## 2025-05-19 DIAGNOSIS — K76.0 FATTY (CHANGE OF) LIVER, NOT ELSEWHERE CLASSIFIED: ICD-10-CM

## 2025-05-19 DIAGNOSIS — E83.39 OTHER DISORDERS OF PHOSPHORUS METABOLISM: ICD-10-CM

## 2025-05-19 DIAGNOSIS — E87.6 HYPOKALEMIA: ICD-10-CM

## 2025-05-19 DIAGNOSIS — R00.0 TACHYCARDIA, UNSPECIFIED: ICD-10-CM

## 2025-05-19 DIAGNOSIS — K31.9 DISEASE OF STOMACH AND DUODENUM, UNSPECIFIED: ICD-10-CM
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
## 2025-05-19 DIAGNOSIS — R65.20 SEVERE SEPSIS WITHOUT SEPTIC SHOCK: ICD-10-CM

## 2025-05-19 DIAGNOSIS — A41.9 SEPSIS, UNSPECIFIED ORGANISM: ICD-10-CM

## 2025-05-19 DIAGNOSIS — E87.1 HYPO-OSMOLALITY AND HYPONATREMIA: ICD-10-CM

## 2025-05-19 DIAGNOSIS — R74.01 ELEVATION OF LEVELS OF LIVER TRANSAMINASE LEVELS: ICD-10-CM

## 2025-05-19 DIAGNOSIS — J69.0 PNEUMONITIS DUE TO INHALATION OF FOOD AND VOMIT: ICD-10-CM

## 2025-05-19 DIAGNOSIS — E87.20 ACIDOSIS, UNSPECIFIED: ICD-10-CM

## 2025-05-19 DIAGNOSIS — K20.91 ESOPHAGITIS, UNSPECIFIED WITH BLEEDING: ICD-10-CM

## 2025-05-19 DIAGNOSIS — K44.9 DIAPHRAGMATIC HERNIA WITHOUT OBSTRUCTION OR GANGRENE: ICD-10-CM

## 2025-05-19 DIAGNOSIS — D62 ACUTE POSTHEMORRHAGIC ANEMIA: ICD-10-CM

## 2025-05-19 DIAGNOSIS — I10 ESSENTIAL (PRIMARY) HYPERTENSION: ICD-10-CM

## 2025-06-18 ENCOUNTER — EMERGENCY (EMERGENCY)
Facility: HOSPITAL | Age: 45
LOS: 1 days | End: 2025-06-18
Attending: EMERGENCY MEDICINE | Admitting: EMERGENCY MEDICINE
Payer: COMMERCIAL

## 2025-06-18 VITALS
TEMPERATURE: 98 F | DIASTOLIC BLOOD PRESSURE: 88 MMHG | SYSTOLIC BLOOD PRESSURE: 148 MMHG | RESPIRATION RATE: 18 BRPM | HEART RATE: 110 BPM | OXYGEN SATURATION: 95 %

## 2025-06-18 RX ORDER — CHLORDIAZEPOXIDE HCL 10 MG
25 CAPSULE ORAL ONCE
Refills: 0 | Status: DISCONTINUED | OUTPATIENT
Start: 2025-06-18 | End: 2025-06-18

## 2025-06-18 RX ORDER — CHLORDIAZEPOXIDE HCL 10 MG
1 CAPSULE ORAL
Qty: 10 | Refills: 0
Start: 2025-06-18

## 2025-06-18 RX ADMIN — Medication 25 MILLIGRAM(S): at 04:18

## 2025-06-18 NOTE — ED PROVIDER NOTE - PATIENT PORTAL LINK FT
You can access the FollowMyHealth Patient Portal offered by [facility] by registering at the following website: [email or web address]. By joining [employer]’s FollowMyHealth portal, you will also be able to view your health information using other applications (apps) compatible with our system.

## 2025-06-18 NOTE — ED ADULT TRIAGE NOTE - CHIEF COMPLAINT QUOTE
pt states he is having alcohol withdrawal. endorse last drink was 30mins PTA pt ambulates with a steady gait unable to state symptoms expect "I feel bad"

## 2025-06-18 NOTE — ED PROVIDER NOTE - NSFOLLOWUPINSTRUCTIONS_ED_ALL_ED_FT
DETOX/REHAB PROGRAMS:    [facility] Short Term Free Detox [place] - [address] [place] [address]    [facility] Detox & Rehab Unit - [facility] [address], [place] [address] ([phone])     You presented with signs and symptoms of alcohol intoxication and withdrawal. A history of heavy drinking puts you at risk for alcohol withdrawal after a period without drinking. The withdrawal period can last several days and can cause severe symptoms, even death. Therefore you were managed in the hospital with medications to help with your symptoms and prevent complications.    It is important that you continue to avoid drinking. Alcohol is responsible for several physical, mental, and social problems. These include injury to your heart, brain, and liver. Alcohol is also responsible for a large number of injuries and deaths. You are at risk of these problems and also at risk of going through withdrawal again if you resume drinking.    There are many support groups and rehabilitation programs available to those seeking to reduce or quit alcohol use. Please make use of them. Please also follow up with your primary care doctor for continued medical care.    If you need immediate assistance with substance abuse you may contact the [facility] by calling [phone].  [facility]: [phone]. [facility] -[phone] You have been prescribed a Librium taper.  It is imperative that you do not drink alcohol while taking this medication as it increases your risk of death.    DETOX/REHAB PROGRAMS:    [facility] Short Term Free Detox [place] - [address] [place] [address], [address], [place] [address]    [facility] Detox & Rehab Unit - [facility] [address], [place] [address] ([phone])     You presented with signs and symptoms of alcohol intoxication and withdrawal. A history of heavy drinking puts you at risk for alcohol withdrawal after a period without drinking. The withdrawal period can last several days and can cause severe symptoms, even death. Therefore you were managed in the hospital with medications to help with your symptoms and prevent complications.    It is important that you continue to avoid drinking. Alcohol is responsible for several physical, mental, and social problems. These include injury to your heart, brain, and liver. Alcohol is also responsible for a large number of injuries and deaths. You are at risk of these problems and also at risk of going through withdrawal again if you resume drinking.    There are many support groups and rehabilitation programs available to those seeking to reduce or quit alcohol use. Please make use of them. Please also follow up with your primary care doctor for continued medical care.    If you need immediate assistance with substance abuse you may contact the [facility] by calling [phone].  [facility]: [phone]. [facility] -[phone]

## 2025-06-18 NOTE — ED PROVIDER NOTE - PROGRESS NOTE DETAILS
Patient's tachycardia likely related to anxiety and poor p.o. intake over the past few days.  Will give Librium and prescription to take at home.  Patient is familiar with the medication and is aware that he cannot drink while taking the medication.

## 2025-06-18 NOTE — ED ADULT NURSE NOTE - OBJECTIVE STATEMENT
Pt is AA&O*3 in NAD. Pt c/o alcohol withdrawal. RN will continue to monitor pt and provide care as needed.

## 2025-06-18 NOTE — ED PROVIDER NOTE - CLINICAL SUMMARY MEDICAL DECISION MAKING FREE TEXT BOX
HPI:  44M presents to the ED with symptoms of alcohol withdrawal. Patient reports drinking approximately 20 beers per day recently during a "brown" lasting an unspecified number of days. His last drink was approximately 30 minutes prior to arrival. He is experiencing shakes and reports feeling bad. PMH significant for hypertension, for which he takes an unspecified medication. Patient denies other medications or illicit drug use. He lives with his parents and reports working at [place] [employer], though he has not worked during this recent period of heavy drinking. Patient states he is "severely ready to stop drinking" at this time.    PE:  -General: Patient alert and oriented, appears shaky.   -Neuro: No gross focal deficits noted on limited exam. Patient able to stick out tongue and hold arms outstretched with fingers spread.   -Skin: No obvious lesions or rashes noted.   -CIWA:     MDM:  Patient presenting with symptoms consistent with mild alcohol withdrawal following reported heavy alcohol use.   -Alcohol withdrawal syndrome - less likely as patient has not reduced his drinking in the past day  -Anxiety - more likely given symptoms  -Alcohol intoxication - more likely given reported etoh use/consumption     -Plan:   -Administer Librium in the ED for anxiety/early withdrawal symptoms.   -Prescribe Librium for ongoing management of withdrawal, with clear instructions to avoid alcohol use while taking medication.   -Recommend follow-up with primary care physician for management of hypertension and to discuss alcohol cessation strategies.   -Provide resources for alcohol detoxification programs and consider inpatient admission if withdrawal symptoms are severe or patient is at high risk for complications. HPI:  44M presents to the ED with symptoms of alcohol withdrawal. Patient reports drinking approximately 20 beers per day recently during a "brown" lasting an unspecified number of days. His last drink was approximately 30 minutes prior to arrival. He is experiencing shakes and reports feeling bad. PMH significant for hypertension, for which he takes an unspecified medication. Patient denies other medications or illicit drug use. He lives with his parents and reports working at [place] [employer], though he has not worked during this recent period of heavy drinking. Patient states he is "severely ready to stop drinking" at this time.    PE:  -General: Patient alert and oriented, appears shaky.   -Neuro: No gross focal deficits noted on limited exam. Patient able to stick out tongue and hold arms outstretched with fingers spread.   -Skin: No obvious lesions or rashes noted.   -CIWA: 6    MDM:  Patient presenting with symptoms consistent with mild alcohol withdrawal following reported heavy alcohol use.   -Alcohol withdrawal syndrome - less likely as patient has not reduced his drinking in the past day  -Anxiety - more likely given symptoms  -Alcohol intoxication - more likely given reported etoh use/consumption     -Plan:   -Administer Librium in the ED for anxiety/early withdrawal symptoms.   -Prescribe Librium for ongoing management of withdrawal, with clear instructions to avoid alcohol use while taking medication.   -Recommend follow-up with primary care physician for management of hypertension and to discuss alcohol cessation strategies.   -Provide resources for alcohol detoxification programs and consider inpatient admission if withdrawal symptoms are severe or patient is at high risk for complications.

## 2025-06-20 DIAGNOSIS — F41.9 ANXIETY DISORDER, UNSPECIFIED: ICD-10-CM

## 2025-06-20 DIAGNOSIS — R00.0 TACHYCARDIA, UNSPECIFIED: ICD-10-CM

## 2025-06-20 DIAGNOSIS — F10.939 ALCOHOL USE, UNSPECIFIED WITH WITHDRAWAL, UNSPECIFIED: ICD-10-CM

## 2025-06-20 DIAGNOSIS — I10 ESSENTIAL (PRIMARY) HYPERTENSION: ICD-10-CM

## 2025-06-28 VITALS
HEART RATE: 135 BPM | HEIGHT: 66 IN | OXYGEN SATURATION: 98 % | TEMPERATURE: 100 F | SYSTOLIC BLOOD PRESSURE: 138 MMHG | RESPIRATION RATE: 18 BRPM | DIASTOLIC BLOOD PRESSURE: 86 MMHG

## 2025-06-28 LAB
ALBUMIN SERPL ELPH-MCNC: 2.9 G/DL — LOW (ref 3.4–5)
ALP SERPL-CCNC: 166 U/L — HIGH (ref 40–120)
ALT FLD-CCNC: 131 U/L — HIGH (ref 12–42)
ANION GAP SERPL CALC-SCNC: 10 MMOL/L — SIGNIFICANT CHANGE UP (ref 9–16)
ANION GAP SERPL CALC-SCNC: 8 MMOL/L — LOW (ref 9–16)
APPEARANCE UR: ABNORMAL
APTT BLD: 25.1 SEC — LOW (ref 26.1–36.8)
AST SERPL-CCNC: 217 U/L — HIGH (ref 15–37)
BASOPHILS # BLD AUTO: 0.02 K/UL — SIGNIFICANT CHANGE UP (ref 0–0.2)
BASOPHILS NFR BLD AUTO: 0.2 % — SIGNIFICANT CHANGE UP (ref 0–2)
BILIRUB SERPL-MCNC: 1.1 MG/DL — SIGNIFICANT CHANGE UP (ref 0.2–1.2)
BILIRUB UR-MCNC: ABNORMAL
BUN SERPL-MCNC: 19 MG/DL — SIGNIFICANT CHANGE UP (ref 7–23)
BUN SERPL-MCNC: 25 MG/DL — HIGH (ref 7–23)
CALCIUM SERPL-MCNC: 7.9 MG/DL — LOW (ref 8.5–10.5)
CALCIUM SERPL-MCNC: 9.3 MG/DL — SIGNIFICANT CHANGE UP (ref 8.5–10.5)
CHLORIDE SERPL-SCNC: 85 MMOL/L — LOW (ref 96–108)
CHLORIDE SERPL-SCNC: 92 MMOL/L — LOW (ref 96–108)
CO2 SERPL-SCNC: 33 MMOL/L — HIGH (ref 22–31)
CO2 SERPL-SCNC: 33 MMOL/L — HIGH (ref 22–31)
COLOR SPEC: ABNORMAL
CREAT SERPL-MCNC: 0.75 MG/DL — SIGNIFICANT CHANGE UP (ref 0.5–1.3)
CREAT SERPL-MCNC: 1.06 MG/DL — SIGNIFICANT CHANGE UP (ref 0.5–1.3)
DIFF PNL FLD: ABNORMAL
EGFR: 115 ML/MIN/1.73M2 — SIGNIFICANT CHANGE UP
EGFR: 115 ML/MIN/1.73M2 — SIGNIFICANT CHANGE UP
EGFR: 89 ML/MIN/1.73M2 — SIGNIFICANT CHANGE UP
EGFR: 89 ML/MIN/1.73M2 — SIGNIFICANT CHANGE UP
EOSINOPHIL # BLD AUTO: 0.01 K/UL — SIGNIFICANT CHANGE UP (ref 0–0.5)
EOSINOPHIL NFR BLD AUTO: 0.1 % — SIGNIFICANT CHANGE UP (ref 0–6)
ETHANOL SERPL-MCNC: <3 MG/DL — SIGNIFICANT CHANGE UP
FLUAV AG NPH QL: SIGNIFICANT CHANGE UP
FLUBV AG NPH QL: SIGNIFICANT CHANGE UP
GLUCOSE SERPL-MCNC: 120 MG/DL — HIGH (ref 70–99)
GLUCOSE SERPL-MCNC: 91 MG/DL — SIGNIFICANT CHANGE UP (ref 70–99)
GLUCOSE UR QL: NEGATIVE MG/DL — SIGNIFICANT CHANGE UP
HCT VFR BLD CALC: 25.2 % — LOW (ref 39–50)
HGB BLD-MCNC: 8.3 G/DL — LOW (ref 13–17)
IMM GRANULOCYTES # BLD AUTO: 0.11 K/UL — HIGH (ref 0–0.07)
IMM GRANULOCYTES NFR BLD AUTO: 1 % — HIGH (ref 0–0.9)
INR BLD: 1.13 — SIGNIFICANT CHANGE UP (ref 0.85–1.16)
KETONES UR QL: NEGATIVE MG/DL — SIGNIFICANT CHANGE UP
LACTATE BLDV-MCNC: 1.3 MMOL/L — SIGNIFICANT CHANGE UP (ref 0.5–2)
LACTATE BLDV-MCNC: 2.8 MMOL/L — HIGH (ref 0.5–2)
LEUKOCYTE ESTERASE UR-ACNC: ABNORMAL
LYMPHOCYTES # BLD AUTO: 2.28 K/UL — SIGNIFICANT CHANGE UP (ref 1–3.3)
LYMPHOCYTES NFR BLD AUTO: 20.7 % — SIGNIFICANT CHANGE UP (ref 13–44)
MAGNESIUM SERPL-MCNC: 1.4 MG/DL — LOW (ref 1.6–2.6)
MCHC RBC-ENTMCNC: 27.9 PG — SIGNIFICANT CHANGE UP (ref 27–34)
MCHC RBC-ENTMCNC: 32.9 G/DL — SIGNIFICANT CHANGE UP (ref 32–36)
MCV RBC AUTO: 84.8 FL — SIGNIFICANT CHANGE UP (ref 80–100)
MONOCYTES # BLD AUTO: 1 K/UL — HIGH (ref 0–0.9)
MONOCYTES NFR BLD AUTO: 9.1 % — SIGNIFICANT CHANGE UP (ref 2–14)
NEUTROPHILS # BLD AUTO: 7.59 K/UL — HIGH (ref 1.8–7.4)
NEUTROPHILS NFR BLD AUTO: 68.9 % — SIGNIFICANT CHANGE UP (ref 43–77)
NITRITE UR-MCNC: POSITIVE
NRBC # BLD AUTO: 0 K/UL — SIGNIFICANT CHANGE UP (ref 0–0)
NRBC # FLD: 0 K/UL — SIGNIFICANT CHANGE UP (ref 0–0)
NRBC BLD AUTO-RTO: 0 /100 WBCS — SIGNIFICANT CHANGE UP (ref 0–0)
PCP SPEC-MCNC: SIGNIFICANT CHANGE UP
PH UR: 8.5 (ref 5–8)
PHOSPHATE SERPL-MCNC: 3 MG/DL — SIGNIFICANT CHANGE UP (ref 2.5–4.5)
PLATELET # BLD AUTO: 238 K/UL — SIGNIFICANT CHANGE UP (ref 150–400)
PMV BLD: 10.3 FL — SIGNIFICANT CHANGE UP (ref 7–13)
POTASSIUM SERPL-MCNC: 2.4 MMOL/L — CRITICAL LOW (ref 3.5–5.3)
POTASSIUM SERPL-MCNC: 2.6 MMOL/L — CRITICAL LOW (ref 3.5–5.3)
POTASSIUM SERPL-SCNC: 2.4 MMOL/L — CRITICAL LOW (ref 3.5–5.3)
POTASSIUM SERPL-SCNC: 2.6 MMOL/L — CRITICAL LOW (ref 3.5–5.3)
PROT SERPL-MCNC: 7.5 G/DL — SIGNIFICANT CHANGE UP (ref 6.4–8.2)
PROT UR-MCNC: 100 MG/DL
PROTHROM AB SERPL-ACNC: 13.2 SEC — SIGNIFICANT CHANGE UP (ref 9.9–13.4)
RBC # BLD: 2.97 M/UL — LOW (ref 4.2–5.8)
RBC # FLD: 16.9 % — HIGH (ref 10.3–14.5)
RSV RNA NPH QL NAA+NON-PROBE: SIGNIFICANT CHANGE UP
SARS-COV-2 RNA SPEC QL NAA+PROBE: SIGNIFICANT CHANGE UP
SODIUM SERPL-SCNC: 128 MMOL/L — LOW (ref 132–145)
SODIUM SERPL-SCNC: 133 MMOL/L — SIGNIFICANT CHANGE UP (ref 132–145)
SOURCE RESPIRATORY: SIGNIFICANT CHANGE UP
SP GR SPEC: 1.02 — SIGNIFICANT CHANGE UP (ref 1–1.03)
UROBILINOGEN FLD QL: 0.2 MG/DL — SIGNIFICANT CHANGE UP (ref 0.2–1)
WBC # BLD: 11.01 K/UL — HIGH (ref 3.8–10.5)
WBC # FLD AUTO: 11.01 K/UL — HIGH (ref 3.8–10.5)

## 2025-06-28 PROCEDURE — 74176 CT ABD & PELVIS W/O CONTRAST: CPT | Mod: 26

## 2025-06-28 PROCEDURE — 99285 EMERGENCY DEPT VISIT HI MDM: CPT

## 2025-06-28 PROCEDURE — 71045 X-RAY EXAM CHEST 1 VIEW: CPT | Mod: 26

## 2025-06-28 RX ORDER — MAGNESIUM SULFATE 500 MG/ML
2 SYRINGE (ML) INJECTION ONCE
Refills: 0 | Status: COMPLETED | OUTPATIENT
Start: 2025-06-28 | End: 2025-06-28

## 2025-06-28 RX ORDER — ONDANSETRON HCL/PF 4 MG/2 ML
4 VIAL (ML) INJECTION ONCE
Refills: 0 | Status: COMPLETED | OUTPATIENT
Start: 2025-06-28 | End: 2025-06-28

## 2025-06-28 RX ORDER — CHLORDIAZEPOXIDE HCL 10 MG
50 CAPSULE ORAL ONCE
Refills: 0 | Status: DISCONTINUED | OUTPATIENT
Start: 2025-06-28 | End: 2025-06-28

## 2025-06-28 RX ORDER — IBUPROFEN 200 MG
600 TABLET ORAL ONCE
Refills: 0 | Status: COMPLETED | OUTPATIENT
Start: 2025-06-28 | End: 2025-06-28

## 2025-06-28 RX ORDER — LORAZEPAM 4 MG/ML
1 VIAL (ML) INJECTION ONCE
Refills: 0 | Status: DISCONTINUED | OUTPATIENT
Start: 2025-06-28 | End: 2025-06-28

## 2025-06-28 RX ORDER — CHLORDIAZEPOXIDE HCL 10 MG
25 CAPSULE ORAL ONCE
Refills: 0 | Status: DISCONTINUED | OUTPATIENT
Start: 2025-06-28 | End: 2025-06-28

## 2025-06-28 RX ORDER — CEFTRIAXONE 500 MG/1
1000 INJECTION, POWDER, FOR SOLUTION INTRAMUSCULAR; INTRAVENOUS ONCE
Refills: 0 | Status: COMPLETED | OUTPATIENT
Start: 2025-06-28 | End: 2025-06-28

## 2025-06-28 RX ADMIN — Medication 40 MILLIEQUIVALENT(S): at 20:53

## 2025-06-28 RX ADMIN — Medication 1 MILLIGRAM(S): at 18:18

## 2025-06-28 RX ADMIN — Medication 25 GRAM(S): at 23:10

## 2025-06-28 RX ADMIN — Medication 40 MILLIEQUIVALENT(S): at 18:21

## 2025-06-28 RX ADMIN — Medication 1 MILLIGRAM(S): at 22:45

## 2025-06-28 RX ADMIN — Medication 4 MILLIGRAM(S): at 18:19

## 2025-06-28 RX ADMIN — Medication 100 MILLIEQUIVALENT(S): at 20:45

## 2025-06-28 RX ADMIN — Medication 50 MILLIGRAM(S): at 16:24

## 2025-06-28 RX ADMIN — Medication 600 MILLIGRAM(S): at 16:24

## 2025-06-28 RX ADMIN — CEFTRIAXONE 100 MILLIGRAM(S): 500 INJECTION, POWDER, FOR SOLUTION INTRAMUSCULAR; INTRAVENOUS at 16:34

## 2025-06-28 RX ADMIN — Medication 1 MILLIGRAM(S): at 16:24

## 2025-06-28 RX ADMIN — Medication 25 MILLIGRAM(S): at 22:45

## 2025-06-28 RX ADMIN — Medication 3500 MILLILITER(S): at 16:28

## 2025-06-28 NOTE — ED PROVIDER NOTE - NEUROLOGICAL, MLM
Alert and oriented, no focal deficits, no motor or sensory deficits. speech and gait normal . bilateral hand fine tremor

## 2025-06-28 NOTE — ED PROVIDER NOTE - OBJECTIVE STATEMENT
Patient is a 44-year-old male who was brought to the emergency room ambulatory by his father.    The patient complains of blood in his urine x 1 day.  no dysuria.    The patient had a fall from a standing height over a week ago for which he was evaluated at [place] and the workup was negative according to the patient  the Patient has well healing abrasions and bruises to his upper back and extremities.   Patient denies pain to flank or trauma to flank with his fall 1+ weeks ago.    Patient with previous admissions to [facility] for alcohol related withdrawal.  Last admission to [facility] in May 2024.  Old records reviewed.    Patient states that recently he has been drinking alcohol with greater frequency -  last drink was 3 days ago    The patient denies the following symptoms:  headache, dizziness/lightheadedness, neck pain/neck stiffness, numbness/tingling, photophobia, change in vision/hearing/gait/mental status/speech,difficulty swallowing, focal weakness, rash, fever, chills, chest/neck/jaw/arm or back pain, palpitations, shortness of breath, diaphoresis, swelling to arm and/or legs, N/V/D, abdominal pain, abdominal distention, back pain, flank pain, change in urinary or bowel function, dysuria,or Patient is a 44-year-old male who was brought to the emergency room ambulatory by his father.    The patient complains of blood in his urine x 1 day.  no dysuria.  The patient had a fall from a standing height over a week ago for which he was evaluated at [place] and the workup was negative according to the patient  the Patient has well healing abrasions and bruises to his upper back and extremities.   Patient denies pain to flank or trauma to flank with his fall 1+ weeks ago.    Patient with previous admissions to [facility] for alcohol related withdrawal.  Last admission to [facility] in May 2024.  Old records reviewed.    Patient states that recently he has been drinking alcohol with greater frequency -  last drink was 3 days ago    The patient denies the following symptoms:  headache, dizziness/lightheadedness, neck pain/neck stiffness, numbness/tingling, photophobia, change in vision/hearing/gait/mental status/speech,difficulty swallowing, focal weakness, rash, fever, chills, chest/neck/jaw/arm or back pain, palpitations, shortness of breath, diaphoresis, swelling to arm and/or legs, N/V/D, abdominal pain, abdominal distention, back pain, flank pain, change in urinary or bowel function, dysuria,or

## 2025-06-28 NOTE — H&P ADULT - NSHPLABSRESULTS_GEN_ALL_CORE
.  LABS:                         8.3    11.01 )-----------( 238      ( 28 Jun 2025 16:16 )             25.2     06-28    133  |  92[L]  |  19  ----------------------------<  91  2.4[LL]   |  33[H]  |  0.75    Ca    7.9[L]      28 Jun 2025 19:14    TPro  7.5  /  Alb  2.9[L]  /  TBili  1.1  /  DBili  x   /  AST  217[H]  /  ALT  131[H]  /  AlkPhos  166[H]  06-28    PT/INR - ( 28 Jun 2025 16:16 )   PT: 13.2 sec;   INR: 1.13          PTT - ( 28 Jun 2025 16:16 )  PTT:25.1 sec  Urinalysis Basic - ( 28 Jun 2025 19:14 )    Color: x / Appearance: x / SG: x / pH: x  Gluc: 91 mg/dL / Ketone: x  / Bili: x / Urobili: x   Blood: x / Protein: x / Nitrite: x   Leuk Esterase: x / RBC: x / WBC x   Sq Epi: x / Non Sq Epi: x / Bacteria: x                RADIOLOGY, EKG & ADDITIONAL TESTS: Reviewed.

## 2025-06-28 NOTE — ED PROVIDER NOTE - CLINICAL SUMMARY MEDICAL DECISION MAKING FREE TEXT BOX
The patient complains of blood in his urine x 1 day.  no dysuria.  The patient had a fall from a standing height over a week ago for which he was evaluated at [place] and the workup was negative according to the patient  the Patient has well healing abrasions and bruises to his upper back and extremities.   Patient denies pain to flank or trauma to flank with his fall 1+ weeks ago.      + alcohol withdrawal symptoms improving while in ER CIWA 7 at 830pm   case dw - Dr Herrera of urology @ 825pm   case de dr scherer @ 830pm

## 2025-06-28 NOTE — H&P ADULT - ASSESSMENT
NEURO  Alcohol withdrawal  Prior admisson for EtOh abuse: yes, last admitted 5/2025, seizures- no , h/o intubations-none,   drinking since  , Last drink on , Drank        Qty:   CIWA score @admission-   - CIWA checks  - Phenobarbital protocol @15mg/kg, (650, 520, 390), Loading dose for  check level 2 hours after administration  - If CIWA>8, Phenobarb 130mg IVPB q15min PRN for RASS>3  - Seizure precautions, Aspiration precautions  - Thiamine 500mg IV q8h for 3 days.   - Folic acid 1mg IVP qd.  - SBIRT   - Regular diet  - Monitor electrolytes    CARDIO  # Sinus tachycardia  HR @admission: 111,most likely iso sepsis and withdrawal  EKG:   Plan  - Monitor vitals       PULM  Saturating 100% on RA    RENAL  #Hyponatremia- Hypotonic hypovolemic  Na-128, measured s.osm-272  Physical exam- ???  Urine Na- , Urine Osm-   s/p 3.5L NS in [facility] ED, Na improved to 133  Plan  - BMP q12h  -     # Hypokalemia  K-2.6 @admission, EKG changes-   KCL 40 Tab POx2, KCl 10mEq IV x1  Plan  - replete PRN  - BMP q12h  - check Mag, phos    GI  # transaminitis  AST- 217, ALT-131, Alk phos- 166, Cory-1.1, R factor-2.3  probably iso alcohol intoxication  - Abdominal ultrasound with hepatic steatosis 5/25  - Hepatic panel negative 5/25  Plan  - ctm    # Hx of GI bleed last admission 5/25  Plan  - ppi daily  - npo ovn  carafate??? ask if he finished it      #Hemorrhagic exophytic bladder dome lesion, possibly within a diverticulum   versus contained perforation due to trauma, consider cystoscopy.    ID  #Severe Sepsis  Pt meets 2/4 SIRS criteria (Tmax-100.9 , HR-135), Source- urine, Lactate-2.8  - c/w    ua+, W/ hematuria  hemorrhagic cystisi vs tumor lesion  cw ctx  bld cx, uerine cx  foleys in, ask if bladder irrigation needed    HEME  Active T&S,   Hold AC    ENDO  - fsg q6h      MSK  #T12 vertebral compression fracture  Plan  - Bedrest  - consider ortho consult    F:s/p 3.5L IVF  E: replete K<4, Mg<2  N: NPO on    VTE Prophylaxis: SCD  GI: protonix 40  D: 7Lachman       NEURO  Alcohol withdrawal  Prior admisson for EtOh abuse: yes, last admitted 5/2025, seizures- no , h/o intubations-none,   drinking since  , Last drink on , Drank        Qty:   CIWA score @admission-   - CIWA checks  - Phenobarbital protocol @15mg/kg, (650, 520, 390), Loading dose for  check level 2 hours after administration  - If CIWA>8, Phenobarb 130mg IVPB q15min PRN for RASS>3  - Seizure precautions, Aspiration precautions  - Thiamine 500mg IV q8h for 3 days.   - Folic acid 1mg IVP qd.  - SBIRT   - Regular diet  - Monitor electrolytes    CARDIO  # Sinus tachycardia  HR @admission: 111,most likely iso sepsis and withdrawal  EKG:   Plan  - Monitor vitals       PULM  Saturating 100% on RA    RENAL  #Hyponatremia- Hypotonic hypovolemic  Na-128, measured s.osm-272  Physical exam- ???  Urine Na- , Urine Osm-   s/p 3.5L NS in [facility], Na improved to 133  Plan  - BMP q12h  -     # Hypokalemia  K-2.6 @admission, EKG changes-   KCL 40 Tab POx2, KCl 10mEq IV x1  Plan  - replete PRN  - BMP q12h  - check Mag, phos    GI  # transaminitis  AST- 217, ALT-131, Alk phos- 166, Cory-1.1, R factor-2.3  probably iso alcohol intoxication  - Abdominal ultrasound with hepatic steatosis 5/25  - Hepatic panel negative 5/25  Plan  - ctm    # Hx of GI bleed last admission 5/25  EGD 5/8/2025- Grade D esophagitis  GI bleed iso possible varicss  Plan  - ppi daily  - npo ovn  carafate??? ask if he finished it      #Hemorrhagic exophytic bladder dome lesion,   possibly within a diverticulum versus contained perforation due to trauma,   Plan  - urology consult for cystoscopy.    #UTI  - Plan as below in     ID  #Severe Sepsis  Pt meets 2/4 SIRS criteria (Tmax-100.9 , HR-135), Source- urine, Lactate-2.8  - c/w    ua+, W/ hematuria  hemorrhagic cystisi vs tumor lesion  cw ctx  bld cx, uerine cx  foleys in, ask if bladder irrigation needed    HEME  Active T&S,   Hold AC    ENDO  - fsg q6h      MSK  #T12 vertebral compression fracture  Plan  - Bedrest  - consider ortho consult    F:s/p 3.5L IVF  E: replete K<4, Mg<2  N: NPO on    VTE Prophylaxis: SCD  GI: protonix 40  D: 7Lachman       NEURO  Alcohol withdrawal  Prior admisson for EtOh abuse: yes, last admitted 5/2025, seizures- no , h/o intubations-none,   drinking since  , Last drink on , Drank        Qty:   CIWA score @admission-   - CIWA checks  - Phenobarbital protocol @15mg/kg, (650, 520, 390), Loading dose for  check level 2 hours after administration  - If CIWA>8, Phenobarb 130mg IVPB q15min PRN for RASS>3  - Seizure precautions, Aspiration precautions  - Thiamine 500mg IV q8h for 3 days.   - Folic acid 1mg IVP qd.  - SBIRT   - Regular diet  - Monitor electrolytes    CARDIO  # Sinus tachycardia  HR @admission: 111,most likely iso sepsis and withdrawal  EKG:   Plan  - Monitor vitals       PULM  Saturating 100% on RA    RENAL  #Hyponatremia- Hypotonic hypovolemic  Na-128, measured s.osm-272  Physical exam- ???  Urine Na- , Urine Osm-   s/p 3.5L NS in [facility], Na improved to 133  Plan  - BMP q12h  -     # Hypokalemia  K-2.6 @admission, EKG changes-   KCL 40 Tab POx2, KCl 10mEq IV x1  Plan  - replete PRN  - BMP q12h  - check Mag, phos    GI  # transaminitis  AST- 217, ALT-131, Alk phos- 166, Cory-1.1, R factor-2.3  probably iso alcohol intoxication  - Abdominal ultrasound with hepatic steatosis 5/25  - Hepatic panel negative 5/25  Plan  - ctm    # Hx of GI bleed last admission 5/25  EGD 5/8/2025- Grade D esophagitis  GI bleed iso possible varicss  Plan  - ppi daily  - npo ovn  carafate??? ask if he finished it      #Hemorrhagic exophytic bladder dome lesion,   possibly within a diverticulum versus contained perforation due to trauma,   Plan  - urology consult for cystoscopy.    #UTI  - Plan as below in     ID  #Severe Sepsis  Pt meets 2/4 SIRS criteria (Tmax-100.9 , HR-135), Source- urine, Lactate-2.8 (normalized to 1.3), s/p 3.5L NS  UA+ for bacteria, w/hematuria, ddx: hemorrhagic cystitis vs tumoral lesion vs complicated UTI  Plan  - fu urine c/s  - fu blood c/s  - fu CXR  - c/w Ctx 1gm qd for 7 days  - Fu urology recs  - foleys+ w/hematuria, may need bladder irrigation      HEME  Active T&S,   Hold AC    ENDO  - fsg q6h      MSK  #T12 vertebral compression fracture  Plan  - Bedrest  - consider ortho consult    F:s/p 3.5L IVF  E: replete K<4, Mg<2  N: NPO on    VTE Prophylaxis: SCD  GI: protonix 40  D: 7Lachman       NEURO  Alcohol withdrawal  Prior admisson for EtOh abuse: yes, last admitted 5/2025, seizures- no , h/o intubations-none,   drinking since  , Last drink on , Drank        Qty:   s/p librium 50x1, librium 25x1, lorazepam 1mg x2 @ ED  CIWA score @admission-   - CIWA checks  - Phenobarbital protocol @15mg/kg, (650, 520, 390), Loading dose for  check level 2 hours after administration  - If CIWA>8, Phenobarb 130mg IVPB q15min PRN for RASS>3  - Seizure precautions, Aspiration precautions  - Thiamine 500mg IV q8h for 3 days.   - Folic acid 1mg IVP qd.  - SBIRT   - Regular diet  - Monitor electrolytes    CARDIO  # Sinus tachycardia  HR @admission: 111,most likely iso sepsis and withdrawal  EKG:   Plan  - Monitor vitals       PULM  Saturating 100% on RA    RENAL  #Hyponatremia- Hypotonic hypovolemic  Na-128, measured s.osm-272  Physical exam- ???  Urine Na- , Urine Osm-   s/p 3.5L NS in  ED, Na improved to 133  Plan  - BMP q12h  -     # Hypokalemia  K-2.6 @admission, EKG changes-   KCL 40 Tab POx2, KCl 10mEq IV x1  Plan  - replete PRN  - BMP q12h  - check Mag, phos    GI  # transaminitis  AST- 217, ALT-131, Alk phos- 166, Cory-1.1, R factor-2.3  probably iso alcohol intoxication  - Abdominal ultrasound with hepatic steatosis 5/25  - Hepatic panel negative 5/25  Plan  - ctm    # Hx of GI bleed last admission 5/25  EGD 5/8/2025- Grade D esophagitis  GI bleed iso possible varicss  Plan  - ppi daily  - npo ovn  carafate??? ask if he finished it      #Hemorrhagic exophytic bladder dome lesion,   possibly within a diverticulum versus contained perforation due to trauma,   Plan  - urology consult for cystoscopy.    #UTI  - Plan as below in     ID  #Severe Sepsis  Pt meets 2/4 SIRS criteria (Tmax-100.9 , HR-135), Source- urine, Lactate-2.8 (normalized to 1.3), s/p 3.5L NS  UA+ for bacteria, w/hematuria, ddx: hemorrhagic cystitis vs tumoral lesion vs complicated UTI  Plan  - fu urine c/s  - fu blood c/s  - fu CXR  - c/w Ctx 1gm qd for 7 days  - Fu urology recs  - foleys+ w/hematuria, may need bladder irrigation      HEME  Active T&S,   Hold AC    ENDO  - fsg q6h      MSK  #T12 vertebral compression fracture  Plan  - Bedrest  - consider ortho consult    F:s/p 3.5L IVF  E: replete K<4, Mg<2  N: NPO on    VTE Prophylaxis: SCD  GI: protonix 40  D: 7Lachman       NEURO  Alcohol withdrawal  Prior admisson for EtOh abuse: yes, last admitted 5/2025, seizures- yes (at age 31y), h/o intubations-none,   drinking since 2weeks ago , Last drink 1 week ago, Drank   5bottles of vodka and 12 bottles of beer   s/p librium 50x1, librium 25x1, lorazepam 1mg x2 @ ED  CIWA score @admission- 0  - CIWA checks  - If CIWA>8, Phenobarb 130mg IVPB q15min PRN for RASS>3  - Seizure precautions, Aspiration precautions  - Thiamine 500mg IV q8h for 3 days.   - Folic acid 1mg IVP qd.  - SBIRT   - Monitor electrolytes    CARDIO  # Sinus tachycardia  HR @admission: 111,most likely iso sepsis and withdrawal  EKG:   Plan  - Monitor vitals       PULM  Saturating 100% on RA    RENAL  #Hyponatremia- Hypotonic hypovolemic  Na-128, measured s.osm-272  Physical exam- dry MM  s/p 3.5L NS in  ED, Na improved to 133  Plan  - BMP q12h  - Fu urine studies    # Hypokalemia  K-2.6 @admission, EKG changes-   KCL 40 Tab POx2, KCl 10mEq IV x1  Plan  - replete PRN  - BMP q12h  - check Mag, phos    GI  # transaminitis  AST- 217, ALT-131, Alk phos- 166, Cory-1.1, R factor-2.3  probably iso alcohol intoxication  - Abdominal ultrasound with hepatic steatosis 5/25  - Hepatic panel negative 5/25  Plan  - ctm    # Hx of GI bleed last admission 5/25  EGD 5/8/2025- Grade D esophagitis  GI bleed iso possible varicss  Plan  - ppi daily  - npo ovn  carafate??? ask if he finished it      #Hemorrhagic exophytic bladder dome lesion,   possibly within a diverticulum versus contained perforation due to trauma,   Plan  - urology consult for cystoscopy.    #UTI  - Plan as below in     ID  #Severe Sepsis  Pt meets 2/4 SIRS criteria (Tmax-100.9 , HR-135), Source- urine, Lactate-2.8 (normalized to 1.3), s/p 3.5L NS  UA+ for bacteria, w/hematuria, ddx: hemorrhagic cystitis vs tumoral lesion vs complicated UTI  Plan  - fu urine c/s  - fu blood c/s  - fu CXR  - c/w Ctx 1gm qd for 7 days  - Fu urology recs  - foleys+ w/hematuria, may need bladder irrigation      HEME  Active T&S,   Hold AC    ENDO  - fsg q6h      MSK  #T12 vertebral compression fracture  Plan  - Bedrest  - consider ortho consult    F:s/p 3.5L IVF  E: replete K<4, Mg<2  N: NPO on    VTE Prophylaxis: SCD  GI: protonix 40  D: 7Lachman       NEURO  Alcohol withdrawal  Prior admisson for EtOh abuse: yes, last admitted 5/2025, seizures- yes (at age 31y), h/o intubations-none,   drinking since 2weeks ago , Last drink 1 week ago, Drank   5bottles of vodka and 12 bottles of beer   s/p librium 50x1, librium 25x1, lorazepam 1mg x2 @ ED  CIWA score @admission- 0  - CIWA checks  - If CIWA>8, Phenobarb 130mg IVPB q15min PRN for RASS>3  - Seizure precautions, Aspiration precautions  - Thiamine 500mg IV q8h for 3 days.   - Folic acid 1mg IVP qd.  - SBIRT   - Monitor electrolytes    #Fall  Pt w/h/o fall on 6/21, went to [place] ER, had some imaging edone and was told he doesnt have any concussion/acute fractures, Left AMA  Fall complicated by nicko hematuria 6/22 and difficulty initiating urine, Dint improve in the last 7 days and presented to  ED  Physical exam significant for periumbilical bruising, R flank bruise, healing abrasion on medial R thigh and R leg  Plan  - fall precautions  - Urology consult  - measure post-void residue  - fu CT urogram      CARDIO  # Sinus tachycardia  HR @admission: 111,most likely iso sepsis and withdrawal  EKG:   Plan  - Monitor vitals       PULM  Saturating 100% on RA    RENAL  #Hyponatremia- Hypotonic hypovolemic  Na-128, measured s.osm-272  Physical exam- dry MM  s/p 3.5L NS in  ED, Na improved to 133  Plan  - BMP q12h  - Fu urine studies    # Hypokalemia  K-2.6 @admission, EKG changes-   KCL 40 Tab POx2, KCl 10mEq IV x1  Plan  - replete PRN  - BMP q12h  - check Mag, phos    GI  # transaminitis  AST- 217, ALT-131, Alk phos- 166, Cory-1.1, R factor-2.3  probably iso alcohol intoxication  - Abdominal ultrasound with hepatic steatosis 5/25  - Hepatic panel negative 5/25  Plan  - ctm    # Hx of GI bleed last admission 5/25  EGD 5/8/2025- Grade D esophagitis  GI bleed iso possible varices  Plan  - ppi daily  - npo ovn        #Hematuria  # Difficulty initiating urination  CT renal stone hunt: Hemorrhagic exophytic bladder dome lesion, possibly within a diverticulum versus contained perforation due to trauma, T12 vertebral compression fracture  Could be 2/2 trauma, malignancy, or UTI  Plan  - urology consult for cystoscopy.  - rest as outlined under fall section  - Bladder scan to measure PVR  - foleys per urology    #UTI  - Plan as below in ID    ID  #Severe Sepsis  Pt meets 2/4 SIRS criteria (Tmax-100.9 , HR-135), Source- urine, Lactate-2.8 (normalized to 1.3), s/p 3.5L NS  UA+ for bacteria, w/hematuria, ddx: hemorrhagic cystitis vs tumoral lesion vs complicated UTI  Plan  - fu urine c/s  - fu blood c/s  - fu CXR  - c/w Ctx 1gm qd for 7 days  - Fu urology recs  - foleys+ w/hematuria, may need bladder irrigation      HEME  Active T&S,   Hold AC    ENDO  - fsg q6h      MSK  #T12 vertebral compression fracture  Plan  - Bedrest  - consider ortho consult    F:s/p 3.5L IVF  E: replete K<4, Mg<2  N: NPO on    VTE Prophylaxis: SCD  GI: protonix 40  D: 7Lachman       NEURO  Alcohol withdrawal  Prior admisson for EtOh abuse: yes, last admitted 5/2025, seizures- yes (at age 31y), h/o intubations-none,   drinking since 2weeks ago , Last drink 1 week ago, Drank   5bottles of vodka and 12 bottles of beer   s/p librium 50x1, librium 25x1, lorazepam 1mg x2 @ ED  CIWA score @admission- 0  - CIWA checks  - If CIWA>8, Phenobarb 130mg IVPB q15min PRN for RASS>3  - Seizure precautions, Aspiration precautions  - Thiamine 500mg IV q8h for 3 days.   - Folic acid 1mg IVP qd.  - SBIRT   - Monitor electrolytes    #Fall  Pt w/h/o fall on 6/21, went to [place] ER, had some imaging edone and was told he doesnt have any concussion/acute fractures, Left AMA  Fall complicated by nicko hematuria 6/22 and difficulty initiating urine, Dint improve in the last 7 days and presented to  ED  Physical exam significant for periumbilical bruising, R flank bruise, healing abrasion on medial R thigh and R leg  Plan  - fall precautions  - Urology consult  - measure post-void residue  - fu CT urogram      CARDIO  # Sinus tachycardia  HR @admission: 111,most likely iso sepsis and withdrawal  EKG:   Plan  - Monitor vitals       PULM  Saturating 100% on RA    RENAL  #Hyponatremia- Hypotonic hypovolemic  Na-128, measured s.osm-272  Physical exam- dry MM  s/p 3.5L NS in  ED, Na improved to 133  Plan  - BMP q12h  - Fu urine studies    # Hypokalemia  K-2.6 @admission, EKG changes-   KCL 40 Tab POx2, KCl 10mEq IV x1  Plan  - replete PRN  - BMP q12h  - check Mag, phos    GI  # transaminitis  AST- 217, ALT-131, Alk phos- 166, Cory-1.1, R factor-2.3  probably iso alcohol intoxication  - Abdominal ultrasound with hepatic steatosis 5/25  - Hepatic panel negative 5/25  Plan  - ctm    # Hx of GI bleed last admission 5/25  EGD 5/8/2025- Grade D esophagitis  GI bleed iso possible varices  Plan  - ppi daily  - npo ovn        #Hematuria  # Difficulty initiating urination  CT renal stone hunt: Hemorrhagic exophytic bladder dome lesion, possibly within a diverticulum versus contained perforation due to trauma, T12 vertebral compression fracture  Could be 2/2 trauma, malignancy, or UTI  Plan  - urology consult for cystoscopy.  - rest as outlined under fall section  - Bladder scan to measure PVR  - foleys per urology    #UTI  - Plan as below in ID    ID  #Severe Sepsis  Pt meets 2/4 SIRS criteria (Tmax-100.9 , HR-135), Source- urine, Lactate-2.8 (normalized to 1.3), s/p 3.5L NS  UA+ for bacteria, w/hematuria, ddx: hemorrhagic cystitis vs tumoral lesion vs complicated UTI  Plan  - fu urine c/s  - fu blood c/s  - CXR unremarkable @[facility] ED  - c/w Ctx 1gm qd for 7 days  - Fu urology recs  - foleys+ w/hematuria, may need bladder irrigation      HEME  Active T&S,   Hold AC    ENDO  - fsg q6h      MSK  #T12 vertebral compression fracture  Plan  - Bedrest  - consider ortho consult    F:s/p 3.5L IVF  E: replete K<4, Mg<2  N: NPO on    VTE Prophylaxis: SCD  GI: protonix 40  D: 7Lachman       NEURO  Alcohol withdrawal  Prior admisson for EtOh abuse: yes, last admitted 5/2025, seizures- yes (at age 31y), h/o intubations-none,   drinking since 2weeks ago , Last drink 1 week ago, Drank   5bottles of vodka and 12 bottles of beer   s/p librium 50x1, librium 25x1, lorazepam 1mg x2 @ ED  CIWA score @admission- 0  - CIWA checks  - If CIWA>8, Phenobarb 130mg IVPB q15min PRN for RASS>3  - Seizure precautions, Aspiration precautions  - Thiamine 500mg IV q8h for 3 days.   - Folic acid 1mg IVP qd.  - SBIRT   - Monitor electrolytes    #Fall  Pt w/h/o fall on 6/21, went to [place] ER, had some imaging edone and was told he doesnt have any concussion/acute fractures, Left AMA  Fall complicated by nicko hematuria 6/22 and difficulty initiating urine, Dint improve in the last 7 days and presented to  ED  Physical exam significant for periumbilical bruising, R flank bruise, healing abrasion on medial R thigh and R leg  Plan  - fall precautions  - Urology consult  - measure post-void residue  - fu CT urogram      CARDIO  # Sinus tachycardia  HR @admission: 111,most likely iso sepsis and withdrawal  EKG:   Plan  - Monitor vitals       PULM  Saturating 100% on RA    RENAL  #Hyponatremia- Hypotonic hypovolemic  Na-128, measured s.osm-272  Physical exam- dry MM  s/p 3.5L NS in  ED, Na improved to 133  Plan  - BMP q12h  - Fu urine studies    # Hypokalemia  K-2.6 @admission, EKG changes-   KCL 40 Tab POx2, KCl 10mEq IV x1  Plan  - replete PRN  - BMP q12h  - check Mag, phos    GI  # transaminitis  AST- 217, ALT-131, Alk phos- 166, Cory-1.1, R factor-2.3  probably iso alcohol intoxication  - Abdominal ultrasound with hepatic steatosis 5/25  - Hepatic panel negative 5/25  Plan  - ctm    # Hx of GI bleed last admission 5/25  EGD 5/8/2025- Grade D esophagitis  GI bleed iso possible varices  Plan  - ppi daily  - npo ovn        #Hematuria  # Difficulty initiating urination  CT renal stone hunt: Hemorrhagic exophytic bladder dome lesion, possibly within a diverticulum versus contained perforation due to trauma, T12 vertebral compression fracture  Could be 2/2 trauma, malignancy, or UTI  Plan  - urology consult for cystoscopy.  - rest as outlined under fall section  - Bladder scan to measure PVR  - foleys per urology    #UTI  - Plan as below in ID    ID  #Severe Sepsis  Pt meets 2/4 SIRS criteria (Tmax-100.9 , HR-135), Source- urine, Lactate-2.8 (normalized to 1.3), s/p 3.5L NS  UA+ for bacteria, w/hematuria, ddx: hemorrhagic cystitis vs tumoral lesion vs complicated UTI  Plan  - fu urine c/s  - fu blood c/s  - CXR unremarkable @[facility] ED  - c/w Ctx 1gm qd for 7 days  - Fu urology recs  - foleys+ w/hematuria, may need bladder irrigation      HEME  #anemia   Normocytic anemia to Hgb 8.3, MCV 85 on admission. Baseline Hgb ~8.7  (5/9).   - f/u iron panel, b12, folate  - Maintain active type/screen, Transfuse if hgb <7    ENDO  - fsg q6h      MSK  #T12 vertebral compression fracture  Plan  - Bedrest  - consider ortho consult    F:s/p 3.5L IVF  E: replete K<4, Mg<2  N: NPO on    VTE Prophylaxis: SCD  GI: protonix 40  D: 7Lachman   Patient is a 44 y.o. male w/ PMHx of alcohol-related withdrawal (previously admitted to [facility] in May 2024), presenting to the ED w/ one week of bloody urine a/w difficulty initiating urine for the past week after a fall d/t heavy alcohol use, found to have no tremors or tongue fasciculations, CIWA score of 0, and with low potassium (2.4) and low magnesium (1.4). CT abdomen suggestive of bladder dome injury, currently pending urology recs for potential cystoscopy.    NEURO  Alcohol withdrawal  Prior admisson for EtOh abuse: yes, last admitted 5/2025, seizures- yes (at age 31y), h/o intubations-none,   drinking since 2weeks ago , Last drink 1 week ago, Drank   5bottles of vodka and 12 bottles of beer   s/p librium 50x1, librium 25x1, lorazepam 1mg x2 @ ED  CIWA score @admission- 0  - CIWA checks  - If CIWA>8, Phenobarb 130mg IVPB q15min PRN for RASS>3  - Seizure precautions, Aspiration precautions  - Thiamine 500mg IV q8h for 3 days.   - Folic acid 1mg IVP qd.  - SBIRT   - Monitor electrolytes    #Fall  Pt w/h/o fall on 6/21, went to [facility] ER, had some imaging edone and was told he doesnt have any concussion/acute fractures, Left AMA  Fall complicated by nicko hematuria 6/22 and difficulty initiating urine, Dint improve in the last 7 days and presented to  ED  Physical exam significant for periumbilical bruising, R flank bruise, healing abrasion on medial R thigh and R leg  Plan  - fall precautions  - Urology consult  - measure post-void residue  - fu CT urogram      CARDIO  # Sinus tachycardia  HR @admission: 111,most likely iso sepsis and withdrawal  EKG:   Plan  - Monitor vitals       PULM  Saturating 100% on RA    RENAL  #Hyponatremia- Hypotonic hypovolemic  Na-128, measured s.osm-272  Physical exam- dry MM  s/p 3.5L NS in  ED, Na improved to 133  Plan  - BMP q12h  - Fu urine studies    # Hypokalemia  K-2.6 @admission, EKG changes-   KCL 40 Tab POx2, KCl 10mEq IV x1  Plan  - replete PRN  - BMP q12h  - check Mag, phos    GI  # transaminitis  AST- 217, ALT-131, Alk phos- 166, Cory-1.1, R factor-2.3  probably iso alcohol intoxication  - Abdominal ultrasound with hepatic steatosis 5/25  - Hepatic panel negative 5/25  Plan  - ctm    # Hx of GI bleed last admission 5/25  EGD 5/8/2025- Grade D esophagitis  GI bleed iso possible varices  Plan  - ppi daily  - npo ovn        #Hematuria  # Difficulty initiating urination  CT renal stone hunt: Hemorrhagic exophytic bladder dome lesion, possibly within a diverticulum versus contained perforation due to trauma, T12 vertebral compression fracture  Could be 2/2 trauma, malignancy, or UTI  Plan  - urology consult for cystoscopy.  - rest as outlined under fall section  - Bladder scan to measure PVR  - foleys per urology    #UTI  - Plan as below in ID    ID  #Severe Sepsis  Pt meets 2/4 SIRS criteria (Tmax-100.9 , HR-135), Source- urine, Lactate-2.8 (normalized to 1.3), s/p 3.5L NS  UA+ for bacteria, w/hematuria, ddx: hemorrhagic cystitis vs tumoral lesion vs complicated UTI  Plan  - fu urine c/s  - fu blood c/s  - CXR unremarkable @[facility] ED  - c/w Ctx 1gm qd for 7 days  - Fu urology recs  - foleys+ w/hematuria, may need bladder irrigation      HEME  #anemia   Normocytic anemia to Hgb 8.3, MCV 85 on admission. Baseline Hgb ~8.7  (5/9).   - f/u iron panel, b12, folate  - Maintain active type/screen, Transfuse if hgb <7    ENDO  - fsg q6h      MSK  #T12 vertebral compression fracture  Plan  - Bedrest  - consider ortho consult    F:s/p 3.5L IVF  E: replete K<4, Mg<2  N: NPO on    VTE Prophylaxis: SCD  GI: protonix 40  D: 7Lachman   Patient is a 44 y.o. male w/ PMHx of HTN and alcohol-related withdrawal admitted to [facility] from  ED w/ one week of hematuria and difficulty initiating urine after a fall 1 week ago while drinking alcohol for over a week straight, CIWA-0 @admission, found to have positive UA,  exophytic lesion in bladder on CT imaging, admitted for further workup and urology eval.    NEURO  Alcohol withdrawal  Prior admisson for EtOh abuse: yes, last admitted 5/2025, seizures- yes (at age 31y), h/o intubations-none,   drinking since 2weeks ago , Last drink 1 week ago, Drank   5bottles of vodka and 12 bottles of beer   s/p librium 50x1, librium 25x1, lorazepam 1mg x2 @ ED  CIWA score @admission- 0  - CIWA checks  - If CIWA>8, Phenobarb 130mg IVPB q15min PRN for RASS>3  - Seizure precautions, Aspiration precautions  - Thiamine 500mg IV q8h for 3 days.   - Folic acid 1mg IVP qd.  - SBIRT   - Monitor electrolytes    #Fall  Pt w/h/o fall on 6/21, went to [place] [facility], had some imaging edone and was told he doesnt have any concussion/acute fractures, Left AMA  Fall complicated by nicko hematuria 6/22 and difficulty initiating urine, Dint improve in the last 7 days and presented to  ED  Physical exam significant for periumbilical bruising, R flank bruise, healing abrasion on medial R thigh and R leg  Plan  - fall precautions  - Urology consult  - measure post-void residue  - fu CT urogram      CARDIO  # Sinus tachycardia  HR @admission: 111,most likely iso sepsis and withdrawal  EKG: HR-92, NSR  Plan  - Monitor vitals       PULM  Saturating 100% on RA    RENAL  #Hyponatremia- Hypotonic hypovolemic  Na-128, measured s.osm-272  Physical exam- dry MM  s/p 3.5L NS in  ED, Na improved to 133  Plan  - BMP q12h  - Fu urine studies    # Hypokalemia  K-2.6 @admission, EKG: no U wave or ST-T flattening on EKG  KCL 40 Tab POx2, KCl 10mEq IV x1  Plan  - replete PRN  - BMP q12h  - check Mag, phos    GI  # transaminitis  AST- 217, ALT-131, Alk phos- 166, Cory-1.1, R factor-2.3  probably iso alcohol intoxication  - Abdominal ultrasound with hepatic steatosis 5/25  - Hepatic panel negative 5/25  Plan  - ctm    # Hx of GI bleed last admission 5/25  EGD 5/8/2025- Grade D esophagitis  GI bleed iso possible varices  Plan  - ppi daily  - npo ovn        #Hematuria  # Difficulty initiating urination  CT renal stone hunt: Hemorrhagic exophytic bladder dome lesion, possibly within a diverticulum versus contained perforation due to trauma, T12 vertebral compression fracture  Could be 2/2 trauma, malignancy, or UTI  Plan  - urology consult for cystoscopy.  - rest as outlined under fall section  - Bladder scan to measure PVR  - foleys per urology    #UTI  - Plan as below in ID    ID  #Severe Sepsis  Pt meets 2/4 SIRS criteria (Tmax-100.9 , HR-135), Source- urine, Lactate-2.8 (normalized to 1.3), s/p 3.5L NS  UA+ for bacteria, w/hematuria, ddx: hemorrhagic cystitis vs tumoral lesion vs complicated UTI  Plan  - fu urine c/s  - fu blood c/s  - CXR unremarkable @[facility] ED  - c/w Ctx 1gm qd for 7 days  - Fu urology recs  - foleys+ w/hematuria, may need bladder irrigation      HEME  #anemia   Normocytic anemia to Hgb 8.3, MCV 85 on admission. Baseline Hgb ~8.7  (5/9).   - f/u iron panel, b12, folate  - Maintain active type/screen, Transfuse if hgb <7    ENDO  - fsg q6h      MSK  #T12 vertebral compression fracture  Plan  - Bedrest  - consider ortho consult    F:s/p 3.5L IVF  E: replete K<4, Mg<2  N: NPO on    VTE Prophylaxis: SCD  GI: protonix 40  D: 7Lachman   Patient is a 44 y.o. male w/ PMHx of HTN and alcohol-related withdrawal admitted to [facility] from  ED w/ one week of hematuria and difficulty initiating urine after a fall 1 week ago while drinking alcohol for over a week straight, CIWA-0 @admission, found to have positive UA,  exophytic lesion in bladder on CT imaging, admitted for further workup and urology eval.    NEURO  Alcohol withdrawal  Prior admisson for EtOh abuse: yes, last admitted 5/2025, seizures- yes (at age 31y), h/o intubations-none,   drinking since 2weeks ago , Last drink 1 week ago, Drank   5bottles of vodka and 12 bottles of beer   s/p librium 50x1, librium 25x1, lorazepam 1mg x2 @ ED  CIWA score @admission- 0  - CIWA checks  - If CIWA>8, Phenobarb 130mg IVPB q15min PRN for RASS>3  - Seizure precautions, Aspiration precautions  - Thiamine 500mg IV q8h for 3 days.   - Folic acid 1mg IVP qd.  - SBIRT   - Monitor electrolytes    #Fall  Pt w/h/o fall on 6/21, went to [place] ER, had some imaging edone and was told he doesnt have any concussion/acute fractures, Left AMA  Fall complicated by nicko hematuria 6/22 and difficulty initiating urine, Dint improve in the last 7 days and presented to  ED  Physical exam significant for periumbilical bruising, R flank bruise, healing abrasion on medial R thigh and R leg  Plan  - fall precautions  - Urology consult  - measure post-void residue  - fu CT urogram      CARDIO  # Sinus tachycardia  HR @admission: 111,most likely iso sepsis and withdrawal  EKG: HR-92, NSR  Plan  - Monitor vitals       PULM  Saturating 100% on RA    RENAL  #Hyponatremia- Hypotonic hypovolemic  Na-128, measured s.osm-272  Physical exam- dry MM  s/p 3.5L NS in  ED, Na improved to 133  Plan  - BMP q12h  - Fu urine studies    # Hypokalemia  K-2.6 @admission, EKG: no U wave or ST-T flattening on EKG  KCL 40 Tab POx2, KCl 10mEq IV x1  Plan  - replete PRN  - BMP q12h  - check Mag, phos    GI  # transaminitis  AST- 217, ALT-131, Alk phos- 166, Cory-1.1, R factor-2.3  probably iso alcohol intoxication  - Abdominal ultrasound with hepatic steatosis 5/25  - Hepatic panel negative 5/25  Plan  - ctm    # Hx of GI bleed last admission 5/25  EGD 5/8/2025- Grade D esophagitis  GI bleed iso possible varices  Plan  - ppi daily  - npo ovn        #Hematuria  # Difficulty initiating urination  CT renal stone hunt: Hemorrhagic exophytic bladder dome lesion, possibly within a diverticulum versus contained perforation due to trauma, T12 vertebral compression fracture  Could be 2/2 trauma, malignancy, or UTI  Plan  - urology consult for cystoscopy.  - rest as outlined under fall section  - Bladder scan to measure PVR  - foleys per urology    #UTI  - Plan as below in ID    ID  #Severe Sepsis  Pt meets 2/4 SIRS criteria (Tmax-100.9 , HR-135), Source- urine, Lactate-2.8 (normalized to 1.3), s/p 3.5L NS  UA+ for bacteria, w/hematuria, ddx: hemorrhagic cystitis vs tumoral lesion vs complicated UTI  Plan  - fu urine c/s  - fu blood c/s  - CXR unremarkable @[facility] ED  - c/w Ctx 1gm qd for 7 days  - Fu urology recs  - foleys+ w/hematuria, may need bladder irrigation      HEME  #anemia- hypoproliferative    Normocytic anemia to Hgb 8.3, MCV 85 on admission. Baseline Hgb ~8.7  (5/9).   Corrected retic count: 1.7, suggestive of hypoproliferative anemia, ddx: nutritional def, malignancy  - f/u iron panel, b12, folate  - Maintain active type/screen, Transfuse if hgb <7    ENDO  - fsg q6h      MSK  #T12 vertebral compression fracture  Plan  - Bedrest  - consider ortho consult    F:s/p 3.5L IVF  E: replete K<4, Mg<2  N: NPO on    VTE Prophylaxis: SCD  GI: protonix 40  D: 7Lachman   Patient is a 44 y.o. male w/ PMHx of HTN and alcohol-related withdrawal admitted to [facility] from  ED w/ one week of hematuria and difficulty initiating urine after a fall 1 week ago while drinking alcohol for over a week straight, CIWA-0 @admission, found to have positive UA,  exophytic lesion in bladder on CT imaging, admitted for further workup and urology eval.    NEURO  Alcohol withdrawal-   Prior admisson for EtOh abuse: yes, last admitted 5/2025, seizures- yes (at age 31y), h/o intubations-none,   drinking since 2weeks ago , Last drink 1 week ago, Drank   5bottles of vodka and 12 bottles of beer   s/p librium 50x1, librium 25x1, lorazepam 1mg x2 @ ED  CIWA score @admission- 0  - CIWA checks, consider dcing CIWA checks since last drink >96h ago  - If CIWA>8, Phenobarb 130mg IVPB q15min PRN for RASS>3  - Seizure precautions, Aspiration precautions  - Thiamine 500mg IV q8h for 3 days.   - Folic acid 1mg IVP qd.  - SBIRT   - Monitor electrolytes    #Fall  Pt w/h/o fall on 6/21, went to [place] ER, had some imaging edone and was told he doesnt have any concussion/acute fractures, Left AMA  Fall complicated by nicko hematuria 6/22 and difficulty initiating urine, Dint improve in the last 7 days and presented to  ED  Physical exam significant for periumbilical bruising, R flank bruise, healing abrasion on medial R thigh and R leg  Plan  - fall precautions  - Urology consult  - measure post-void residue  - fu CT urogram      CARDIO  # Sinus tachycardia  HR @admission: 111,most likely iso sepsis and withdrawal  EKG: HR-92, NSR  Plan  - Monitor vitals       PULM  Saturating 100% on RA    RENAL  #Hyponatremia- Hypotonic hypovolemic  Na-128, measured s.osm-272  Physical exam- dry MM  s/p 3.5L NS in  ED, Na improved to 133  Plan  - BMP q12h  - Fu urine studies    # Hypokalemia  K-2.6 @admission, EKG: no U wave or ST-T flattening on EKG  KCL 40 Tab POx2, KCl 10mEq IV x1  Plan  - replete PRN  - BMP q12h  - check Mag, phos    GI  # transaminitis  AST- 217, ALT-131, Alk phos- 166, Cory-1.1, R factor-2.3  probably iso alcohol intoxication  - Abdominal ultrasound with hepatic steatosis 5/25  - Hepatic panel negative 5/25  Plan  - ctm    # Hx of GI bleed last admission 5/25  EGD 5/8/2025- Grade D esophagitis  GI bleed iso possible varices  Plan  - ppi daily  - npo ovn        #Hematuria  # Difficulty initiating urination  CT renal stone hunt: Hemorrhagic exophytic bladder dome lesion, possibly within a diverticulum versus contained perforation due to trauma, T12 vertebral compression fracture  Could be 2/2 trauma, malignancy, or UTI  Plan  - urology consult for cystoscopy.  - rest as outlined under fall section  - Bladder scan to measure PVR  - foleys per urology    #UTI  - Plan as below in ID    ID  #Severe Sepsis  Pt meets 2/4 SIRS criteria (Tmax-100.9 , HR-135), Source- urine, Lactate-2.8 (normalized to 1.3), s/p 3.5L NS  UA+ for bacteria, w/hematuria, ddx: hemorrhagic cystitis vs tumoral lesion vs complicated UTI  Plan  - fu urine c/s  - fu blood c/s  - CXR unremarkable @[facility] ED  - c/w Ctx 1gm qd for 7 days  - Fu urology recs  - foleys+ w/hematuria, may need bladder irrigation      HEME  #anemia- hypoproliferative    Normocytic anemia to Hgb 8.3, MCV 85 on admission. Baseline Hgb ~8.7  (5/9).   Corrected retic count: 1.7, suggestive of hypoproliferative anemia, ddx: nutritional def, malignancy  - f/u iron panel, b12, folate  - Maintain active type/screen, Transfuse if hgb <7    ENDO  - fsg q6h      MSK  #T12 vertebral compression fracture  Plan  - Bedrest  - consider ortho consult    F:s/p 3.5L IVF  E: replete K<4, Mg<2  N: NPO on    VTE Prophylaxis: SCD  GI: protonix 40  D: 7Lachman   Patient is a 44 y.o. male w/ PMHx of HTN and alcohol-related withdrawal admitted to [facility] from  ED w/ one week of hematuria and difficulty initiating urine after a fall 1 week ago while drinking alcohol for over a week straight, CIWA-0 @admission, found to have positive UA,  exophytic lesion in bladder on CT imaging, admitted for further workup and urology eval.    NEURO  Alcohol withdrawal-   Prior admisson for EtOh abuse: yes, last admitted 5/2025, seizures- yes (at age 31y), h/o intubations-none,   drinking since 2weeks ago , Last drink 1 week ago, Drank   5bottles of vodka and 12 bottles of beer   s/p librium 50x1, librium 25x1, lorazepam 1mg x2 @ ED  CIWA score @admission- 0  - CIWA checks, consider dcing CIWA checks since last drink >96h ago  - If CIWA>8, Phenobarb 130mg IVPB q15min PRN for RASS>3  - Seizure precautions, Aspiration precautions  - Thiamine 500mg IV q8h for 3 days.   - Folic acid 1mg qd.  - SBIRT   - Monitor electrolytes    #Fall  Pt w/h/o fall on 6/21, went to [place] ER, had some imaging edone and was told he doesnt have any concussion/acute fractures, Left AMA  Fall complicated by nicko hematuria 6/22 and difficulty initiating urine, Dint improve in the last 7 days and presented to  ED  Physical exam significant for periumbilical bruising, R flank bruise, healing abrasion on medial R thigh and R leg  Plan  - fall precautions  - Urology consult  - measure post-void residue  - fu CT urogram      CARDIO  # Sinus tachycardia  HR @admission: 111,most likely iso sepsis and withdrawal  EKG: HR-92, NSR  Plan  - Monitor vitals       PULM  Saturating 100% on RA    RENAL  #Hyponatremia- Hypotonic hypovolemic  Na-128, measured s.osm-272  Physical exam- dry MM  s/p 3.5L NS in  ED, Na improved to 133  Plan  - BMP q12h  - Fu urine studies    # Hypokalemia  K-2.6 @admission, EKG: no U wave or ST-T flattening on EKG  KCL 40 Tab POx2, KCl 10mEq IV x1  Plan  - replete PRN  - BMP q12h  - check Mag, phos    GI  # transaminitis  AST- 217, ALT-131, Alk phos- 166, Cory-1.1, R factor-2.3  probably iso alcohol intoxication  - Abdominal ultrasound with hepatic steatosis 5/25  - Hepatic panel negative 5/25  Plan  - ctm    # Hx of GI bleed last admission 5/25  EGD 5/8/2025- Grade D esophagitis  GI bleed iso possible varices  Plan  - ppi daily  - npo ovn        #Hematuria  # Difficulty initiating urination  CT renal stone hunt: Hemorrhagic exophytic bladder dome lesion, possibly within a diverticulum versus contained perforation due to trauma, T12 vertebral compression fracture  Could be 2/2 trauma, malignancy, or UTI  Plan  - urology consult for cystoscopy.  - rest as outlined under fall section  - Bladder scan to measure PVR  - foleys per urology    #UTI  - Plan as below in ID    ID  #Severe Sepsis  Pt meets 2/4 SIRS criteria (Tmax-100.9 , HR-135), Source- urine, Lactate-2.8 (normalized to 1.3), s/p 3.5L NS  UA+ for bacteria, w/hematuria, ddx: hemorrhagic cystitis vs tumoral lesion vs complicated UTI  Plan  - fu urine c/s  - fu blood c/s  - CXR unremarkable @[facility] ED  - c/w Ctx 1gm qd for 7 days  - Fu urology recs  - foleys+ w/hematuria, may need bladder irrigation      HEME  #anemia- hypoproliferative    Normocytic anemia to Hgb 8.3, MCV 85 on admission. Baseline Hgb ~8.7  (5/9).   Corrected retic count: 1.7, suggestive of hypoproliferative anemia, ddx: nutritional def, malignancy  - f/u iron panel, b12, folate  - Maintain active type/screen, Transfuse if hgb <7    ENDO  - fsg q6h      MSK  #T12 vertebral compression fracture  Plan  - Bedrest  - consider ortho consult    F:s/p 3.5L IVF  E: replete K<4, Mg<2  N: NPO on    VTE Prophylaxis: SCD  GI: protonix 40  D: 7Lachman   Patient is a 44 y.o. male w/ PMHx of HTN and alcohol-related withdrawal admitted to [facility] from  ED w/ one week of hematuria and difficulty initiating urine after a fall 1 week ago while drinking alcohol for over a week straight, CIWA-0 @admission, found to have positive UA,  exophytic lesion in bladder on CT imaging, admitted for further workup and urology eval.    NEURO  Alcohol withdrawal-   Prior admisson for EtOh abuse: yes, last admitted 5/2025, seizures- yes (at age 31y), h/o intubations-none,   drinking since 2weeks ago , Last drink 1 week ago, Drank   5bottles of vodka and 12 bottles of beer   s/p librium 50x1, librium 25x1, lorazepam 1mg x2 @ ED  CIWA score @admission- 0  - CIWA checks, consider dcing CIWA checks since last drink >96h ago  - If CIWA>8, Phenobarb 130mg IVPB q15min PRN for RASS>3  - Seizure precautions, Aspiration precautions  - Thiamine 500mg IV q8h for 3 days.   - Folic acid 1mg qd.  - SBIRT   - Monitor electrolytes    #Fall  Pt w/h/o fall on 6/21, went to [place] ER, had some imaging edone and was told he doesnt have any concussion/acute fractures, Left AMA  Fall complicated by nicko hematuria 6/22 and difficulty initiating urine, Dint improve in the last 7 days and presented to  ED  Physical exam significant for periumbilical bruising, R flank bruise, healing abrasion on medial R thigh and R leg  Plan  - fall precautions  - Urology consult  - measure post-void residue  - fu CT urogram      CARDIO  # Sinus tachycardia  HR @admission: 111,most likely iso sepsis and withdrawal  EKG: HR-92, NSR  Plan  - Monitor vitals     #HTN  -Cw home med losartan 25mg qd    PULM  Saturating 100% on RA    RENAL  #Hyponatremia- Hypotonic hypovolemic  Na-128, measured s.osm-272  Physical exam- dry MM  s/p 3.5L NS in  ED, Na improved to 133  Plan  - BMP q12h  - Fu urine studies    # Hypokalemia  K-2.6 @admission, EKG: no U wave or ST-T flattening on EKG  KCL 40 Tab POx2, KCl 10mEq IV x1  Plan  - replete PRN  - BMP q12h  - check Mag, phos    GI  # transaminitis  AST- 217, ALT-131, Alk phos- 166, Cory-1.1, R factor-2.3  probably iso alcohol intoxication  - Abdominal ultrasound with hepatic steatosis 5/25  - Hepatic panel negative 5/25  Plan  - ctm    # Hx of GI bleed last admission 5/25  EGD 5/8/2025- Grade D esophagitis  GI bleed iso possible varices  Plan  - ppi daily  - npo ovn        #Hematuria  # Difficulty initiating urination  CT renal stone hunt: Hemorrhagic exophytic bladder dome lesion, possibly within a diverticulum versus contained perforation due to trauma, T12 vertebral compression fracture  Could be 2/2 trauma, malignancy, or UTI  Plan  - urology consult for cystoscopy.  - rest as outlined under fall section  - Bladder scan to measure PVR  - foleys per urology    #UTI  - Plan as below in ID    ID  #Severe Sepsis  Pt meets 2/4 SIRS criteria (Tmax-100.9 , HR-135), Source- urine, Lactate-2.8 (normalized to 1.3), s/p 3.5L NS  UA+ for bacteria, w/hematuria, ddx: hemorrhagic cystitis vs tumoral lesion vs complicated UTI  Plan  - fu urine c/s  - fu blood c/s  - CXR unremarkable @[facility] ED  - c/w Ctx 1gm qd for 7 days  - Fu urology recs  - foleys+ w/hematuria, may need bladder irrigation      HEME  #anemia- hypoproliferative    Normocytic anemia to Hgb 8.3, MCV 85 on admission. Baseline Hgb ~8.7  (5/9).   Corrected retic count: 1.7, suggestive of hypoproliferative anemia, ddx: nutritional def, malignancy  - f/u iron panel, b12, folate  - Maintain active type/screen, Transfuse if hgb <7    ENDO  - fsg q6h      MSK  #T12 vertebral compression fracture  Plan  - Bedrest  - consider ortho consult    F:s/p 3.5L IVF  E: replete K<4, Mg<2  N: NPO on    VTE Prophylaxis: SCD  GI: protonix 40  D: 7Lachman

## 2025-06-28 NOTE — H&P ADULT - NSHPPHYSICALEXAM_GEN_ALL_CORE
.  VITAL SIGNS:  T(F): 98.8 (06-28-25 @ 22:15), Max: 100.9 (06-28-25 @ 16:03)  HR: 105 (06-28-25 @ 22:15) (103 - 135)  BP: 147/77 (06-28-25 @ 22:15) (137/78 - 147/77)  BP(mean): --  RR: 18 (06-28-25 @ 22:15) (18 - 20)  SpO2: 99% (06-28-25 @ 22:15) (97% - 100%)    PHYSICAL EXAM:    Constitutional: resting comfortably in bed; NAD  HEENT: NC/AT, PERRL, EOMI, anicteric sclera, MMM  Neck: supple; no JVD or thyromegaly  Respiratory: unlabored breathing, CTA B/L; no W/Rhonchi/Crackles,   Cardiac: +S1/S2; RRR; no M/R/G; No ventricular heaves, PMI non-displaced  Gastrointestinal: soft, NT/ND; no rebound or guarding; +BSx4  Vascular: 2+ radial, DP/PT pulses B/L  Neurologic: AAOx3; CNII-XII grossly intact; no focal deficits .  VITAL SIGNS:  T(F): 98.8 (06-28-25 @ 22:15), Max: 100.9 (06-28-25 @ 16:03)  HR: 105 (06-28-25 @ 22:15) (103 - 135)  BP: 147/77 (06-28-25 @ 22:15) (137/78 - 147/77)  BP(mean): --  RR: 18 (06-28-25 @ 22:15) (18 - 20)  SpO2: 99% (06-28-25 @ 22:15) (97% - 100%)    PHYSICAL EXAM:    Constitutional: resting comfortably in bed; NAD  HEENT: NC/AT, PERRL, EOMI, anicteric sclera, MMM  Neck: supple; no JVD or thyromegaly  Respiratory: unlabored breathing, CTA B/L; no W/Rhonchi/Crackles,   Cardiac: +S1/S2; RRR; no M/R/G; No ventricular heaves, PMI non-displaced  Gastrointestinal: soft, NT/ND; no rebound or guarding; +BSx4; negative carreon sign  Vascular: 2+ radial, DP/PT pulses B/L  Neurologic: AAOx3; CNII-XII grossly intact; no focal deficits, no tremors or tongue fasciculations  Skin: Bruises on upper back, large abrasion on lateral RLE .  VITAL SIGNS:  T(F): 98.8 (06-28-25 @ 22:15), Max: 100.9 (06-28-25 @ 16:03)  HR: 105 (06-28-25 @ 22:15) (103 - 135)  BP: 147/77 (06-28-25 @ 22:15) (137/78 - 147/77)  BP(mean): --  RR: 18 (06-28-25 @ 22:15) (18 - 20)  SpO2: 99% (06-28-25 @ 22:15) (97% - 100%)    PHYSICAL EXAM:    Constitutional: resting comfortably in bed; NAD, CIWA-0  HEENT: NC/AT, PERRL, EOMI, anicteric sclera, dry MM  Neck: supple; no JVD or thyromegaly  Respiratory: unlabored breathing, CTA B/L; no W/Rhonchi/Crackles   Cardiac: +S1/S2; RRR; no M/R/G;   Gastrointestinal: soft, NT/ND; kevin-umbilical bruising, lower edge of liver not palpable, no rebound or guarding; +BSx4; negative carreon sign  Vascular: 2+ radial, DP/PT pulses B/L  Neurologic: AAOx3; CNII-XII grossly intact; no focal deficits, no tremors or tongue fasciculations  Skin: Bruises on R flank, large abrasion on lateral RLE

## 2025-06-28 NOTE — ED ADULT NURSE NOTE - NSFALLHARMRISKINTERV_ED_ALL_ED
Assistance OOB with selected safe patient handling equipment if applicable/Assistance with ambulation/Communicate risk of Fall with Harm to all staff, patient, and family/Monitor gait and stability/Provide visual cue: red socks, yellow wristband, yellow gown, etc/Reinforce activity limits and safety measures with patient and family/Bed in lowest position, wheels locked, appropriate side rails in place/Call bell, personal items and telephone in reach/Instruct patient to call for assistance before getting out of bed/chair/stretcher/Non-slip footwear applied when patient is off stretcher/Reva to call system/Physically safe environment - no spills, clutter or unnecessary equipment/Purposeful Proactive Rounding/Room/bathroom lighting operational, light cord in reach

## 2025-06-28 NOTE — ED ADULT NURSE NOTE - OBJECTIVE STATEMENT
Pt presents to ed stating blood in urine for 48hrs. Pt states that he is concerned over UTI. Pt of note had incident in the streets a week ago, large amount of brusing noted all over body. Pt is febrile rectally. AOX4. tremulous, etoh heavy - stopped 3 days ago. hx of possible etoh seizures. PIVs placed, labs sent. UA sent. on cardiac monitoring. MD aware. pending work up.

## 2025-06-28 NOTE — ED PROVIDER NOTE - CARE PLAN
1 Principal Discharge DX:	Alcohol use with withdrawal  Secondary Diagnosis:	Hematuria  Secondary Diagnosis:	Hypokalemia

## 2025-06-28 NOTE — ED PROVIDER NOTE - SKIN, MLM
Skin normal color for race, warm, dry and intact. multiple well healing abrasions and contusion ecchymosis to upper back and exterminates no signs of infection

## 2025-06-28 NOTE — H&P ADULT - HISTORY OF PRESENT ILLNESS
HPI:        Of note,        Patient last admitted,      In the ED,  VS: T   , HR   , BP    , RR    , SpO2     % (RA/NC)  Labs:  Urine:  EKG:  CXR:  Imaging: Other  Orders:    HPI:  Patient is a 44 y.o. male w/ PMHx of alcohol-related withdrawal (w/ prior admission to [facility] in May 2024), presenting to the ED w/ one week of bloody urine for the past week. He experienced an urge to void during this time but was only able to produce a small amount of bloody urine every 20 min. Of note, his bloody urine began after a fall while outside during a night of heavy drinking, where he sustained a head strike, bruises to his upper back as well as a large abrasion on his lateral RLE from the fall. He presented to [facility] ED, where he was discharged after a negative workup. He reports drinking 4 fifths of vodka over the course of one week and 12 cans of beer over two weeks leading up to his fall. On presentation to the ED, denied headache, dizziness/lightheadedness, numbness/tingling, photophobia, changes in vision or hearing, night sweats, chills, n/v/d, and any flank or abdominal pain. Patient was brought to the ED by his father, and was ambulatory on arrival.    Patient last admitted to [facility] in May 2024 for alcohol withdrawal,      In the ED,  VS: T 98.6F, -107, /78- 145/77, RR 18, SpO2 100% on RA  Labs: Hg 8.3, WBC 11.01, Plts 238, INR 1.13, Na 133, K 2.4, Cl 92, CO2 33, AGAP 8, Cr 0.75, Glucose 91, Ca 7.9, Mg 1.4, Phos 3.0  Urine:  EKG:  CXR:  Imaging: Other  Orders:    HPI:  Patient is a 44 y.o. male w/ PMHx of HTN, alcohol-related withdrawal (w/ prior admission to [facility] in May 2024), presenting to the ED w/ one week of bloody urine for the past week. He experienced an urge to void every 20 minutes during this time but was only able to produce a small amount of bloody urine. Of note, his bloody urine began after a fall on 6/21 while outside during a night of heavy drinking, where he sustained a head strike, bruises to his R flank as well as a large abrasion on his lateral RLE from the fall. He presented to [place] ED, where he was discharged after a negative workup (negative for acute fractures, concussin). He reports drinking 5 botles of vodka over the course of one week and 12 cans of beer over two weeks leading up to his fall. On presentation to the ED, denied headache, dizziness/lightheadedness, numbness/tingling, photophobia, changes in vision or hearing, night sweats, chills, n/v/d, and any flank or abdominal pain. Patient was brought to the ED by his father, and was ambulatory on arrival.    Patient last admitted to [facility] in May 2024 for alcohol withdrawal,      In the ED,  VS: T 98.6F, -107, /78- 145/77, RR 18, SpO2 100% on RA  Labs: Hg 8.3, WBC 11.01, Plts 238, INR 1.13, Na 133, K 2.4, Cl 92, CO2 33, AGAP 8, Cr 0.75, Glucose 91, Ca 7.9, Mg 1.4, Phos 3.0  Urine:  EKG:  CXR:  Imaging: Other  Orders:    HPI:  Patient is a 44 y.o. male w/ PMHx of HTN, alcohol-related withdrawal (w/ prior admission to [facility] in May 2024), presenting to the ED w/ one week of bloody urine for the past week. He experienced an urge to void every 20 minutes during this time but was only able to produce a small amount of bloody urine. Of note, his bloody urine began after a fall on 6/21 while outside during a night of heavy drinking, where he sustained a head strike, bruises to his R flank as well as a large abrasion on his lateral RLE from the fall. He presented to [place] ED, where he was discharged after a negative workup (negative for acute fractures, concussion). He reports drinking 5 bottles of vodka over the course of one week and 12 cans of beer over two weeks leading up to his fall. On presentation to the ED, denied headache, dizziness/lightheadedness, numbness/tingling, photophobia, changes in vision or hearing, night sweats, chills, n/v/d, and any flank or abdominal pain. Patient was brought to the ED by his father, and was ambulatory on arrival.    Patient last admitted to [facility] in May 2024 for alcohol withdrawal,      In the ED,  VS: T 98.6F, -107, /78- 145/77, RR 18, SpO2 100% on RA  Labs: Hg 8.3, WBC 11.01, Plts 238, INR 1.13, Na 133, K 2.4, Cl 92, CO2 33, AGAP 8, Cr 0.75, Glucose 91, Ca 7.9, Mg 1.4, Phos 3.0  Urine: Turbid, red colored, pH 8.5, +Leukocyte esterase, +Nitrite, >25 WBC, many bacteria present, +Benzodiazepine  EKG: RRR, ventricular rate 92 bpm  CXR: unremarkable  Imaging: CT renal stone hunt showed severe liver steatosis, a 5 x 4 cm exophytic hemorrhagic lesion contiguous w/ bladder dome, possibly within a diverticulum, and an age-indeterminate T12 vertebral compression fracture w/ mild loss of height and no displacement, acute or subacute  Orders: chlordiazepoxide 1x 25 mg capsule and 1x 50 mg capsule, 3x lorazepam 1 mg IV push, 1x ceftriaxone 1000mg IV, 1x ibuprofen 600 mg tablet, 1x magnesium sulfate 2g/50ml IV, 1x ondansetron 4 mg IV, potassium chloride 1x 10 mEq and 2x 40mEq, 1x sodium chloride 3500 ml 0.9% bolus

## 2025-06-29 ENCOUNTER — INPATIENT (INPATIENT)
Facility: HOSPITAL | Age: 45
LOS: 0 days | Discharge: HOME | End: 2025-06-29
Attending: STUDENT IN AN ORGANIZED HEALTH CARE EDUCATION/TRAINING PROGRAM | Admitting: STUDENT IN AN ORGANIZED HEALTH CARE EDUCATION/TRAINING PROGRAM
Payer: COMMERCIAL

## 2025-06-29 VITALS
DIASTOLIC BLOOD PRESSURE: 58 MMHG | HEART RATE: 98 BPM | RESPIRATION RATE: 16 BRPM | SYSTOLIC BLOOD PRESSURE: 119 MMHG | OXYGEN SATURATION: 100 %

## 2025-06-29 LAB
ADD ON TEST-SPECIMEN IN LAB: SIGNIFICANT CHANGE UP
ALBUMIN SERPL ELPH-MCNC: 3.1 G/DL — LOW (ref 3.3–5)
ALBUMIN SERPL ELPH-MCNC: 3.2 G/DL — LOW (ref 3.3–5)
ALP SERPL-CCNC: 117 U/L — SIGNIFICANT CHANGE UP (ref 40–120)
ALP SERPL-CCNC: 123 U/L — HIGH (ref 40–120)
ALT FLD-CCNC: 72 U/L — HIGH (ref 10–45)
ALT FLD-CCNC: 81 U/L — HIGH (ref 10–45)
ANION GAP SERPL CALC-SCNC: 11 MMOL/L — SIGNIFICANT CHANGE UP (ref 5–17)
ANION GAP SERPL CALC-SCNC: 16 MMOL/L — SIGNIFICANT CHANGE UP (ref 5–17)
APTT BLD: 24.2 SEC — LOW (ref 26.1–36.8)
AST SERPL-CCNC: 115 U/L — HIGH (ref 10–40)
AST SERPL-CCNC: 92 U/L — HIGH (ref 10–40)
B-OH-BUTYR SERPL-SCNC: 1.1 MMOL/L — HIGH
BASOPHILS # BLD AUTO: 0.02 K/UL — SIGNIFICANT CHANGE UP (ref 0–0.2)
BASOPHILS NFR BLD AUTO: 0.3 % — SIGNIFICANT CHANGE UP (ref 0–2)
BILIRUB SERPL-MCNC: 0.6 MG/DL — SIGNIFICANT CHANGE UP (ref 0.2–1.2)
BILIRUB SERPL-MCNC: 0.7 MG/DL — SIGNIFICANT CHANGE UP (ref 0.2–1.2)
BLD GP AB SCN SERPL QL: NEGATIVE — SIGNIFICANT CHANGE UP
BUN SERPL-MCNC: 12 MG/DL — SIGNIFICANT CHANGE UP (ref 7–23)
BUN SERPL-MCNC: 15 MG/DL — SIGNIFICANT CHANGE UP (ref 7–23)
CALCIUM SERPL-MCNC: 7.9 MG/DL — LOW (ref 8.4–10.5)
CALCIUM SERPL-MCNC: 8 MG/DL — LOW (ref 8.4–10.5)
CHLORIDE SERPL-SCNC: 92 MMOL/L — LOW (ref 96–108)
CHLORIDE SERPL-SCNC: 94 MMOL/L — LOW (ref 96–108)
CO2 SERPL-SCNC: 28 MMOL/L — SIGNIFICANT CHANGE UP (ref 22–31)
CO2 SERPL-SCNC: 30 MMOL/L — SIGNIFICANT CHANGE UP (ref 22–31)
CREAT SERPL-MCNC: 0.6 MG/DL — SIGNIFICANT CHANGE UP (ref 0.5–1.3)
CREAT SERPL-MCNC: 0.69 MG/DL — SIGNIFICANT CHANGE UP (ref 0.5–1.3)
EGFR: 118 ML/MIN/1.73M2 — SIGNIFICANT CHANGE UP
EGFR: 118 ML/MIN/1.73M2 — SIGNIFICANT CHANGE UP
EGFR: 123 ML/MIN/1.73M2 — SIGNIFICANT CHANGE UP
EGFR: 123 ML/MIN/1.73M2 — SIGNIFICANT CHANGE UP
EOSINOPHIL # BLD AUTO: 0.06 K/UL — SIGNIFICANT CHANGE UP (ref 0–0.5)
EOSINOPHIL NFR BLD AUTO: 0.9 % — SIGNIFICANT CHANGE UP (ref 0–6)
FERRITIN SERPL-MCNC: 291 NG/ML — SIGNIFICANT CHANGE UP (ref 30–400)
FOLATE SERPL-MCNC: 10 NG/ML — SIGNIFICANT CHANGE UP
GLUCOSE SERPL-MCNC: 89 MG/DL — SIGNIFICANT CHANGE UP (ref 70–99)
GLUCOSE SERPL-MCNC: 99 MG/DL — SIGNIFICANT CHANGE UP (ref 70–99)
HCT VFR BLD CALC: 22.9 % — LOW (ref 39–50)
HCT VFR BLD CALC: 23.4 % — LOW (ref 39–50)
HGB BLD-MCNC: 7.3 G/DL — LOW (ref 13–17)
HGB BLD-MCNC: 7.3 G/DL — LOW (ref 13–17)
IMM GRANULOCYTES # BLD AUTO: 0.12 K/UL — HIGH (ref 0–0.07)
IMM GRANULOCYTES NFR BLD AUTO: 1.7 % — HIGH (ref 0–0.9)
INR BLD: 1.04 — SIGNIFICANT CHANGE UP (ref 0.85–1.16)
IRON SATN MFR SERPL: 11 % — LOW (ref 16–55)
IRON SATN MFR SERPL: 29 UG/DL — LOW (ref 45–165)
LACTATE SERPL-SCNC: 1.1 MMOL/L — SIGNIFICANT CHANGE UP (ref 0.5–2)
LYMPHOCYTES # BLD AUTO: 1.81 K/UL — SIGNIFICANT CHANGE UP (ref 1–3.3)
LYMPHOCYTES NFR BLD AUTO: 25.9 % — SIGNIFICANT CHANGE UP (ref 13–44)
MAGNESIUM SERPL-MCNC: 2 MG/DL — SIGNIFICANT CHANGE UP (ref 1.6–2.6)
MAGNESIUM SERPL-MCNC: 2.1 MG/DL — SIGNIFICANT CHANGE UP (ref 1.6–2.6)
MCHC RBC-ENTMCNC: 28 PG — SIGNIFICANT CHANGE UP (ref 27–34)
MCHC RBC-ENTMCNC: 28.3 PG — SIGNIFICANT CHANGE UP (ref 27–34)
MCHC RBC-ENTMCNC: 31.2 G/DL — LOW (ref 32–36)
MCHC RBC-ENTMCNC: 31.9 G/DL — LOW (ref 32–36)
MCV RBC AUTO: 88.8 FL — SIGNIFICANT CHANGE UP (ref 80–100)
MCV RBC AUTO: 89.7 FL — SIGNIFICANT CHANGE UP (ref 80–100)
MONOCYTES # BLD AUTO: 0.82 K/UL — SIGNIFICANT CHANGE UP (ref 0–0.9)
MONOCYTES NFR BLD AUTO: 11.7 % — SIGNIFICANT CHANGE UP (ref 2–14)
NEUTROPHILS # BLD AUTO: 4.16 K/UL — SIGNIFICANT CHANGE UP (ref 1.8–7.4)
NEUTROPHILS NFR BLD AUTO: 59.5 % — SIGNIFICANT CHANGE UP (ref 43–77)
NRBC # BLD AUTO: 0 K/UL — SIGNIFICANT CHANGE UP (ref 0–0)
NRBC # BLD AUTO: 0 K/UL — SIGNIFICANT CHANGE UP (ref 0–0)
NRBC # FLD: 0 K/UL — SIGNIFICANT CHANGE UP (ref 0–0)
NRBC # FLD: 0 K/UL — SIGNIFICANT CHANGE UP (ref 0–0)
NRBC BLD AUTO-RTO: 0 /100 WBCS — SIGNIFICANT CHANGE UP (ref 0–0)
NRBC BLD AUTO-RTO: 0 /100 WBCS — SIGNIFICANT CHANGE UP (ref 0–0)
OSMOLALITY UR: 354 MOSM/KG — SIGNIFICANT CHANGE UP (ref 300–900)
PHOSPHATE SERPL-MCNC: 2.6 MG/DL — SIGNIFICANT CHANGE UP (ref 2.5–4.5)
PHOSPHATE SERPL-MCNC: 2.9 MG/DL — SIGNIFICANT CHANGE UP (ref 2.5–4.5)
PLATELET # BLD AUTO: 203 K/UL — SIGNIFICANT CHANGE UP (ref 150–400)
PLATELET # BLD AUTO: 220 K/UL — SIGNIFICANT CHANGE UP (ref 150–400)
PMV BLD: 10.7 FL — SIGNIFICANT CHANGE UP (ref 7–13)
PMV BLD: 9.3 FL — SIGNIFICANT CHANGE UP (ref 7–13)
POTASSIUM SERPL-MCNC: 2.8 MMOL/L — CRITICAL LOW (ref 3.5–5.3)
POTASSIUM SERPL-MCNC: 3 MMOL/L — LOW (ref 3.5–5.3)
POTASSIUM SERPL-SCNC: 2.8 MMOL/L — CRITICAL LOW (ref 3.5–5.3)
POTASSIUM SERPL-SCNC: 3 MMOL/L — LOW (ref 3.5–5.3)
PROT SERPL-MCNC: 6.3 G/DL — SIGNIFICANT CHANGE UP (ref 6–8.3)
PROT SERPL-MCNC: 6.4 G/DL — SIGNIFICANT CHANGE UP (ref 6–8.3)
PROTHROM AB SERPL-ACNC: 12 SEC — SIGNIFICANT CHANGE UP (ref 9.9–13.4)
RBC # BLD: 2.58 M/UL — LOW (ref 4.2–5.8)
RBC # BLD: 2.61 M/UL — LOW (ref 4.2–5.8)
RBC # FLD: 17.6 % — HIGH (ref 10.3–14.5)
RBC # FLD: 18 % — HIGH (ref 10.3–14.5)
RH IG SCN BLD-IMP: POSITIVE — SIGNIFICANT CHANGE UP
SODIUM SERPL-SCNC: 135 MMOL/L — SIGNIFICANT CHANGE UP (ref 135–145)
SODIUM SERPL-SCNC: 136 MMOL/L — SIGNIFICANT CHANGE UP (ref 135–145)
SODIUM UR-SCNC: 77 MMOL/L — SIGNIFICANT CHANGE UP
TIBC SERPL-MCNC: 256 UG/DL — SIGNIFICANT CHANGE UP (ref 220–430)
UIBC SERPL-MCNC: 227 UG/DL — SIGNIFICANT CHANGE UP (ref 110–370)
VIT B12 SERPL-MCNC: 1587 PG/ML — HIGH (ref 232–1245)
WBC # BLD: 6.6 K/UL — SIGNIFICANT CHANGE UP (ref 3.8–10.5)
WBC # BLD: 6.99 K/UL — SIGNIFICANT CHANGE UP (ref 3.8–10.5)
WBC # FLD AUTO: 6.6 K/UL — SIGNIFICANT CHANGE UP (ref 3.8–10.5)
WBC # FLD AUTO: 6.99 K/UL — SIGNIFICANT CHANGE UP (ref 3.8–10.5)

## 2025-06-29 PROCEDURE — 74176 CT ABD & PELVIS W/O CONTRAST: CPT

## 2025-06-29 PROCEDURE — 87040 BLOOD CULTURE FOR BACTERIA: CPT

## 2025-06-29 PROCEDURE — 82728 ASSAY OF FERRITIN: CPT

## 2025-06-29 PROCEDURE — 83935 ASSAY OF URINE OSMOLALITY: CPT

## 2025-06-29 PROCEDURE — 74178 CT ABD&PLV WO CNTR FLWD CNTR: CPT | Mod: 26

## 2025-06-29 PROCEDURE — 84100 ASSAY OF PHOSPHORUS: CPT

## 2025-06-29 PROCEDURE — 85610 PROTHROMBIN TIME: CPT

## 2025-06-29 PROCEDURE — 86900 BLOOD TYPING SEROLOGIC ABO: CPT

## 2025-06-29 PROCEDURE — 83540 ASSAY OF IRON: CPT

## 2025-06-29 PROCEDURE — 85027 COMPLETE CBC AUTOMATED: CPT

## 2025-06-29 PROCEDURE — 82607 VITAMIN B-12: CPT

## 2025-06-29 PROCEDURE — 82010 KETONE BODYS QUAN: CPT

## 2025-06-29 PROCEDURE — 80053 COMPREHEN METABOLIC PANEL: CPT

## 2025-06-29 PROCEDURE — 81001 URINALYSIS AUTO W/SCOPE: CPT

## 2025-06-29 PROCEDURE — 82746 ASSAY OF FOLIC ACID SERUM: CPT

## 2025-06-29 PROCEDURE — 86901 BLOOD TYPING SEROLOGIC RH(D): CPT

## 2025-06-29 PROCEDURE — 84300 ASSAY OF URINE SODIUM: CPT

## 2025-06-29 PROCEDURE — 83605 ASSAY OF LACTIC ACID: CPT

## 2025-06-29 PROCEDURE — 71045 X-RAY EXAM CHEST 1 VIEW: CPT

## 2025-06-29 PROCEDURE — 99238 HOSP IP/OBS DSCHRG MGMT 30/<: CPT | Mod: GC

## 2025-06-29 PROCEDURE — 85025 COMPLETE CBC W/AUTO DIFF WBC: CPT

## 2025-06-29 PROCEDURE — 83735 ASSAY OF MAGNESIUM: CPT

## 2025-06-29 PROCEDURE — 85730 THROMBOPLASTIN TIME PARTIAL: CPT

## 2025-06-29 PROCEDURE — 80048 BASIC METABOLIC PNL TOTAL CA: CPT

## 2025-06-29 PROCEDURE — 87086 URINE CULTURE/COLONY COUNT: CPT

## 2025-06-29 PROCEDURE — 96376 TX/PRO/DX INJ SAME DRUG ADON: CPT

## 2025-06-29 PROCEDURE — 80307 DRUG TEST PRSMV CHEM ANLYZR: CPT

## 2025-06-29 PROCEDURE — 96375 TX/PRO/DX INJ NEW DRUG ADDON: CPT

## 2025-06-29 PROCEDURE — 74178 CT ABD&PLV WO CNTR FLWD CNTR: CPT

## 2025-06-29 PROCEDURE — 87637 SARSCOV2&INF A&B&RSV AMP PRB: CPT

## 2025-06-29 PROCEDURE — 99285 EMERGENCY DEPT VISIT HI MDM: CPT | Mod: 25

## 2025-06-29 PROCEDURE — 96374 THER/PROPH/DIAG INJ IV PUSH: CPT

## 2025-06-29 PROCEDURE — 83550 IRON BINDING TEST: CPT

## 2025-06-29 PROCEDURE — 85045 AUTOMATED RETICULOCYTE COUNT: CPT

## 2025-06-29 PROCEDURE — 36415 COLL VENOUS BLD VENIPUNCTURE: CPT

## 2025-06-29 PROCEDURE — 0241U: CPT

## 2025-06-29 PROCEDURE — 86850 RBC ANTIBODY SCREEN: CPT

## 2025-06-29 RX ORDER — CEFTRIAXONE 500 MG/1
1000 INJECTION, POWDER, FOR SOLUTION INTRAMUSCULAR; INTRAVENOUS EVERY 24 HOURS
Refills: 0 | Status: DISCONTINUED | OUTPATIENT
Start: 2025-06-29 | End: 2025-06-29

## 2025-06-29 RX ORDER — SODIUM CHLORIDE 9 G/1000ML
1000 INJECTION, SOLUTION INTRAVENOUS
Refills: 0 | Status: DISCONTINUED | OUTPATIENT
Start: 2025-06-29 | End: 2025-06-29

## 2025-06-29 RX ORDER — ACETAMINOPHEN 500 MG/5ML
650 LIQUID (ML) ORAL EVERY 6 HOURS
Refills: 0 | Status: DISCONTINUED | OUTPATIENT
Start: 2025-06-29 | End: 2025-06-29

## 2025-06-29 RX ORDER — MELATONIN 5 MG
3 TABLET ORAL AT BEDTIME
Refills: 0 | Status: DISCONTINUED | OUTPATIENT
Start: 2025-06-29 | End: 2025-06-29

## 2025-06-29 RX ORDER — ONDANSETRON HCL/PF 4 MG/2 ML
4 VIAL (ML) INJECTION EVERY 8 HOURS
Refills: 0 | Status: DISCONTINUED | OUTPATIENT
Start: 2025-06-29 | End: 2025-06-29

## 2025-06-29 RX ORDER — FOLIC ACID 1 MG/1
1 TABLET ORAL DAILY
Refills: 0 | Status: DISCONTINUED | OUTPATIENT
Start: 2025-06-29 | End: 2025-06-29

## 2025-06-29 RX ORDER — B1/B2/B3/B5/B6/B12/VIT C/FOLIC 500-0.5 MG
1 TABLET ORAL DAILY
Refills: 0 | Status: DISCONTINUED | OUTPATIENT
Start: 2025-06-29 | End: 2025-06-29

## 2025-06-29 RX ORDER — POTASSIUM PHOSPHATE, MONOBASIC POTASSIUM PHOSPHATE, DIBASIC INJECTION, 236; 224 MG/ML; MG/ML
30 SOLUTION, CONCENTRATE INTRAVENOUS ONCE
Refills: 0 | Status: COMPLETED | OUTPATIENT
Start: 2025-06-29 | End: 2025-06-29

## 2025-06-29 RX ORDER — MAGNESIUM, ALUMINUM HYDROXIDE 200-200 MG
30 TABLET,CHEWABLE ORAL EVERY 4 HOURS
Refills: 0 | Status: DISCONTINUED | OUTPATIENT
Start: 2025-06-29 | End: 2025-06-29

## 2025-06-29 RX ORDER — LOSARTAN POTASSIUM 100 MG/1
25 TABLET, FILM COATED ORAL EVERY 24 HOURS
Refills: 0 | Status: DISCONTINUED | OUTPATIENT
Start: 2025-06-29 | End: 2025-06-29

## 2025-06-29 RX ADMIN — SODIUM CHLORIDE 150 MILLILITER(S): 9 INJECTION, SOLUTION INTRAVENOUS at 16:51

## 2025-06-29 RX ADMIN — Medication 40 MILLIEQUIVALENT(S): at 09:26

## 2025-06-29 RX ADMIN — LOSARTAN POTASSIUM 25 MILLIGRAM(S): 100 TABLET, FILM COATED ORAL at 09:27

## 2025-06-29 RX ADMIN — Medication 40 MILLIEQUIVALENT(S): at 13:42

## 2025-06-29 RX ADMIN — SODIUM CHLORIDE 150 MILLILITER(S): 9 INJECTION, SOLUTION INTRAVENOUS at 15:45

## 2025-06-29 RX ADMIN — POTASSIUM PHOSPHATE, MONOBASIC POTASSIUM PHOSPHATE, DIBASIC INJECTION, 83.33 MILLIMOLE(S): 236; 224 SOLUTION, CONCENTRATE INTRAVENOUS at 15:44

## 2025-06-29 RX ADMIN — Medication 40 MILLIGRAM(S): at 06:47

## 2025-06-29 RX ADMIN — FOLIC ACID 1 MILLIGRAM(S): 1 TABLET ORAL at 12:18

## 2025-06-29 RX ADMIN — Medication 40 MILLIEQUIVALENT(S): at 12:14

## 2025-06-29 RX ADMIN — Medication 105 MILLIGRAM(S): at 05:55

## 2025-06-29 RX ADMIN — Medication 105 MILLIGRAM(S): at 13:43

## 2025-06-29 RX ADMIN — Medication 1 TABLET(S): at 12:14

## 2025-06-29 RX ADMIN — SODIUM CHLORIDE 150 MILLILITER(S): 9 INJECTION, SOLUTION INTRAVENOUS at 06:48

## 2025-06-29 RX ADMIN — Medication 40 MILLIEQUIVALENT(S): at 05:56

## 2025-06-29 NOTE — DISCHARGE NOTE NURSING/CASE MANAGEMENT/SOCIAL WORK - NSDCPEEMAIL_GEN_ALL_CORE
[facility] for Tobacco Control email [email or web address]

## 2025-06-29 NOTE — PROVIDER CONTACT NOTE (EICU) - SITUATION
The patient complains of blood in his urine x 1 day.  no dysuria.  The patient had a fall from a standing height over a week ago for which he was evaluated at [place] and the workup was negative according to the patient the Patient has well healing abrasions and bruises to his upper back and extremities.  Patient denies pain to flank or trauma to flank with his fall 1+ weeks ago.  Case discussed with the transfer team, clinically stable for transfer to [facility].

## 2025-06-29 NOTE — PATIENT PROFILE ADULT - SURGICAL SITE INCISION
Zyclara Pregnancy And Lactation Text: This medication is Pregnancy Category C. It is unknown if this medication is excreted in breast milk. no

## 2025-06-29 NOTE — DISCHARGE NOTE NURSING/CASE MANAGEMENT/SOCIAL WORK - NSDCPEWEB_GEN_ALL_CORE
[facility] for Tobacco Control website --- [email or web address] website --- [email or web address]

## 2025-06-29 NOTE — DISCHARGE NOTE PROVIDER - NSDCCPCAREPLAN_GEN_ALL_CORE_FT
PRINCIPAL DISCHARGE DIAGNOSIS  Diagnosis: Hematuria  Assessment and Plan of Treatment: It can be scary to see blood in urine, also called hematuria. In many cases, the cause is harmless. But blood in urine also can be a sign of a serious illness.  If you can see the blood, it's called gross hematuria. Blood that can't be seen with the naked eye is called microscopic hematuria. It's such a small amount that it can be seen only under a microscope when a lab tests the urine. Either way, it's important to figure out the reason for the bleeding.  Treatment depends on the cause.  Imaging in the abdomen showed an outpouching in the wall of your bladder with a blood clot inside. There are no signs of ongoing bleeding.   - You were seen by the urology team who recommended a follow on Tuesday who recommended cytoscopy which is using a camera to look at the inside of your bladder.   - Please continue taking cefpodixime 200mg every 12 hours until July 5th.   - Please also follow up with your primary care provider.      SECONDARY DISCHARGE DIAGNOSES  Diagnosis: Hypokalemia  Assessment and Plan of Treatment:

## 2025-06-29 NOTE — DISCHARGE NOTE NURSING/CASE MANAGEMENT/SOCIAL WORK - PATIENT PORTAL LINK FT
You can access the FollowMyHealth Patient Portal offered by [facility] by registering at the following website: [email or web address]. By joining [employer]’s FollowMyHealth portal, you will also be able to view your health information using other applications (apps) compatible with our system.

## 2025-06-29 NOTE — DISCHARGE NOTE PROVIDER - HOSPITAL COURSE
Patient is a 44 y.o. male w/ PMHx of HTN and alcohol-related withdrawal admitted to [facility] from  ED w/ one week of hematuria and difficulty initiating urine after a fall 1 week ago while drinking alcohol for over a week straight, CIWA-0 @admission, found to have positive UA,  exophytic lesion in bladder on CT imaging thought to be a bladder diverticulum with old clot.    Hospital course (by problem):    #Hematuria  pantoprazole 40mg IV q 8 hrs given   Kept NPO  follow up with urology on 7/1/25 RE: hematuria   will treat asx bacteria with cefpodixime 200mg bid for 1/52    #Alcohol use disorder  thiamine 500mg IV q 8 hours, folic acid 1mg PO OD   CIWA checks     #Electrolytes derangements  KCl 40mEQ x 1 given, KPhos [id number] given for K of 3. Mg, K and Phos monitored closely   #HTN  BP monitored closely   #Iron deficiency anemia  Iron studies ordered-Total iron 29   Patient was discharged to: (home/[facility]/acute rehab/hospice, etc, and with what services – home health PT/RN? Home O2?)    New medications:   cefpdoxime 200mg po bid x1/52  Changes to old medications:  Medications that were stopped:    Items to follow up as outpatient:    Urology follow up 7/1/25    Physical exam at the time of discharge:  Constitutional: Patient resting comfortably in nil distress  Eyes: PERRLA  Neck: Trachea midline, no lymphadenopathy noted		  Respiratory: No wheeze, rales or rhonchi  Cardiac: S1, S2 heart sounds, no murmurs  Gastrointestinal: Soft, non-tender abdomen. Nil masses. No rebound, guarding  Vascular: warm and well perfused,   Skin: Bruising noted on left thigh, left flank at different stages of healing  Capillary refill: 2 seconds

## 2025-06-29 NOTE — DISCHARGE NOTE NURSING/CASE MANAGEMENT/SOCIAL WORK - FINANCIAL ASSISTANCE
[facility] provides services at a reduced cost to those who are determined to be eligible through [facility]’s financial assistance program. Information regarding [facility]’s financial assistance program can be found by going to [email or web address] or by calling [phone].

## 2025-06-29 NOTE — DISCHARGE NOTE PROVIDER - NSDCMRMEDTOKEN_GEN_ALL_CORE_FT
cefpodoxime 200 mg oral tablet: 1 tab(s) orally 2 times a day To start in the morning of 6/30/2025  losartan 25 mg oral tablet: 1 tab(s) orally once a day  Multiple Vitamins oral tablet: 1 tab(s) orally once a day  pantoprazole 40 mg oral delayed release tablet: 1 tab(s) orally every 12 hours  thiamine 100 mg oral tablet: 1 tab(s) orally every 24 hours

## 2025-06-29 NOTE — PROVIDER CONTACT NOTE (EICU) - BACKGROUND
labs reviewed  < from: CT Renal Stone Hunt (06.28.25 @ 18:55) >    Hemorrhagic exophytic bladder dome lesion, possibly within a diverticulum   versus contained perforation due to trauma, consider cystoscopy.    < end of copied text >

## 2025-06-29 NOTE — PROGRESS NOTE ADULT - ASSESSMENT
Patient is a 44 y.o. male w/ PMHx of HTN and alcohol-related withdrawal admitted to [facility] from  ED w/ one week of hematuria and difficulty initiating urine after a fall 1 week ago while drinking alcohol for over a week straight, CIWA-0 @admission, found to have positive UA,  exophytic lesion in bladder on CT imaging, admitted for further workup and urology eval.    NEURO  Alcohol withdrawal-   Prior admisson for EtOh abuse: yes, last admitted 5/2025, seizures- yes (at age 31y), h/o intubations-none,   drinking since 2weeks ago , Last drink 1 week ago, Drank   5bottles of vodka and 12 bottles of beer   s/p librium 50x1, librium 25x1, lorazepam 1mg x2 @ ED  CIWA score @admission- 0  - CIWA checks, consider dcing CIWA checks since last drink >96h ago  - If CIWA>8, Phenobarb 130mg IVPB q15min PRN for RASS>3  - Seizure precautions, Aspiration precautions  - Thiamine 500mg IV q8h for 3 days.   - Folic acid 1mg qd.  - SBIRT   - Monitor electrolytes    #Fall  Pt w/h/o fall on 6/21, went to [place] ER, had some imaging edone and was told he doesnt have any concussion/acute fractures, Left AMA  Fall complicated by nicko hematuria 6/22 and difficulty initiating urine, Dint improve in the last 7 days and presented to  ED  Physical exam significant for periumbilical bruising, R flank bruise, healing abrasion on medial R thigh and R leg  Plan  - fall precautions  - Urology consult  - measure post-void residue  - fu CT urogram      CARDIO  # Sinus tachycardia  HR @admission: 111,most likely iso sepsis and withdrawal  EKG: HR-92, NSR  Plan  - Monitor vitals     #HTN  -Cw home med losartan 25mg qd    PULM  Saturating 100% on RA    RENAL  #Hyponatremia- Hypotonic hypovolemic  Na-128, measured s.osm-272  Physical exam- dry MM  s/p 3.5L NS in  ED, Na improved to 133  Plan  - BMP q12h  - Fu urine studies    # Hypokalemia  K-2.6 @admission, EKG: no U wave or ST-T flattening on EKG  KCL 40 Tab POx2, KCl 10mEq IV x1  Plan  - replete PRN  - BMP q12h  - check Mag, phos    GI  # transaminitis  AST- 217, ALT-131, Alk phos- 166, Cory-1.1, R factor-2.3  probably iso alcohol intoxication  - Abdominal ultrasound with hepatic steatosis 5/25  - Hepatic panel negative 5/25  Plan  - ctm    # Hx of GI bleed last admission 5/25  EGD 5/8/2025- Grade D esophagitis  GI bleed iso possible varices  Plan  - ppi daily  - npo ovn        #Hematuria  # Difficulty initiating urination  CT renal stone hunt: Hemorrhagic exophytic bladder dome lesion, possibly within a diverticulum versus contained perforation due to trauma, T12 vertebral compression fracture  Could be 2/2 trauma, malignancy, or UTI  Plan  - urology consult for cystoscopy.  - rest as outlined under fall section  - Bladder scan to measure PVR  - foleys per urology    #UTI  - Plan as below in ID    ID  #Severe Sepsis  Pt meets 2/4 SIRS criteria (Tmax-100.9 , HR-135), Source- urine, Lactate-2.8 (normalized to 1.3), s/p 3.5L NS  UA+ for bacteria, w/hematuria, ddx: hemorrhagic cystitis vs tumoral lesion vs complicated UTI  Plan  - fu urine c/s  - fu blood c/s  - CXR unremarkable @[facility] ED  - c/w Ctx 1gm qd for 7 days  - Fu urology recs  - foleys+ w/hematuria, may need bladder irrigation      HEME  #anemia- hypoproliferative    Normocytic anemia to Hgb 8.3, MCV 85 on admission. Baseline Hgb ~8.7  (5/9).   Corrected retic count: 1.7, suggestive of hypoproliferative anemia, ddx: nutritional def, malignancy  - f/u iron panel, b12, folate  - Maintain active type/screen, Transfuse if hgb <7    ENDO  - fsg q6h      MSK  #T12 vertebral compression fracture  Plan  - Bedrest  - consider ortho consult    F:s/p 3.5L IVF  E: replete K<4, Mg<2  N: NPO on    VTE Prophylaxis: SCD  GI: protonix 40  D: 7Lachman   Patient is a 44 y.o. male w/ PMHx of HTN and alcohol-related withdrawal admitted to [facility] from  ED w/ one week of hematuria and difficulty initiating urine after a fall 1 week ago while drinking alcohol for over a week straight, CIWA-0 @admission, found to have positive UA,  exophytic lesion in bladder on CT imaging, admitted for further workup and urology eval.    NEURO  Alcohol withdrawal-   Prior admisson for EtOh abuse: yes, last admitted 5/2025, seizures- yes (at age 31y), h/o intubations-none,   drinking since 2weeks ago , Last drink 1 week ago, Drank   5bottles of vodka and 12 bottles of beer   s/p librium 50x1, librium 25x1, lorazepam 1mg x2 @ ED  CIWA score @admission- 0  - CIWA checks, consider dcing CIWA checks since last drink >96h ago  - If CIWA>8, Phenobarb 130mg IVPB q15min PRN for RASS>3  - Seizure precautions, Aspiration precautions  - Thiamine 500mg IV q8h for 3 days.   - Folic acid 1mg qd.  - SBIRT   - Monitor electrolytes    #Fall  Pt w/h/o fall on 6/21, went to [place] ER, had some imaging edone and was told he doesnt have any concussion/acute fractures, Left AMA  Fall complicated by nicko hematuria 6/22 and difficulty initiating urine, Dint improve in the last 7 days and presented to  ED  Physical exam significant for periumbilical bruising, R flank bruise, healing abrasion on medial R thigh and R leg  Plan  - fall precautions  - Urology consult  - measure post-void residue  - CT urogram done on 6/29      CARDIO  # Sinus tachycardia  HR @admission: 111. HR in 90's during hospital stage  EKG: HR-92, NS  Plan  - Monitor vitals     #HTN  -Cw home med losartan 25mg qd    PULM  Saturating 100% on RA    RENAL  #Hyponatremia- Hypotonic hypovolemic  Na-128, measured s.osm-272  Physical exam- dry MM  s/p 3.5L NS in  ED, Na improved to 135  Plan  - BMP q12h  - Fu urine studies    # Hypokalemia  K-2.6 @admission, EKG: no U wave or ST-T flattening on EKG  KCL 40 Tab POx2, KCl 10mEq IV x1  K 2.6 --> 3  Plan  - replete PRN  - BMP q12h  - check Mag, phos    GI  # transaminitis  AST- 217, ALT-131, Alk phos- 166, Cory-1.1, R factor-2.3  probably iso alcohol intoxication  - Abdominal ultrasound with hepatic steatosis 5/25  - Hepatic panel negative 5/25  Plan  - ctm    # Hx of GI bleed last admission 5/25  EGD 5/8/2025- Grade D esophagitis  GI bleed iso possible varices  Plan  - ppi daily  - NPO for CT urogram         #Hematuria  # Difficulty initiating urination  CT renal stone hunt: Hemorrhagic exophytic bladder dome lesion, possibly within a diverticulum versus contained perforation due to trauma, T12 vertebral compression fracture  Could be 2/2 trauma, malignancy, or UTI  Plan  - urology consult for cystoscopy.  - rest as outlined under fall section  - Bladder scan to measure PVR  - Foleys per urology    #UTI  - Plan as below in ID    ID  #Severe Sepsis  Pt meets 2/4 SIRS criteria (Tmax-100.9 , HR-135), Source- urine, Lactate-2.8 (normalized to 1.3), s/p 3.5L NS  UA+ for bacteria, w/hematuria, ddx: hemorrhagic cystitis vs tumoral lesion vs complicated UTI  Plan  - fu urine c/s  - fu blood c/s  - CXR unremarkable @[facility] ED  - c/w Ctx 1gm qd for 7 days  - Fu urology recs  - foleys+ w/hematuria, may need bladder irrigation      HEME  #anemia- hypoproliferative    Normocytic anemia to Hgb 7.3, MCV 85 on admission. Baseline Hgb ~8.7  (5/9).   Corrected retic count: 1.7, suggestive of hypoproliferative anemia, ddx: nutritional def, malignancy  -Iron panel- iron 29  - Maintain active type/screen, Transfuse if hgb <7    ENDO  - fsg q6h      MSK  #T12 vertebral compression fracture  Plan  - Bedrest  - consider ortho consult    F:s/p 3.5L IVF  E: replete K<4, Mg<2  N: NPO on    VTE Prophylaxis: SCD  GI: protonix 40  D: 7Lachman
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
## 2025-06-29 NOTE — PROVIDER CONTACT NOTE (EICU) - ACTION/TREATMENT ORDERED:
Tele SD [facility] transfer  Rocephin  serial HG  Urology w/u  replace yue MARION protocol

## 2025-06-29 NOTE — PATIENT PROFILE ADULT - MONEY FOR FOOD
"  History     Chief Complaint   Patient presents with     Cold Symptoms     \"getting worse the past 3 days\"      The history is provided by the patient. No  was used.     Cristiana Myles is a 40 year old female who presents with a cough that started 2 weeks ago and worsened in past 3 days. She's taken ibuprofen, used Albuterol inhaler, and applied Vix with mild effectiveness. Denies fever. Positive for chills and night sweats. Decreased appetite. Bowel and bladder are working well. No antibiotic use in the past 30 days.       Problem List:    Patient Active Problem List    Diagnosis Date Noted     Well woman exam with routine gynecological exam 02/14/2018     Priority: Medium     Irritable bowel syndrome with both constipation and diarrhea 08/02/2017     Priority: Medium     Recurrent major depressive disorder, in partial remission (H) 08/02/2017     Priority: Medium     ACP (advance care planning) 03/02/2016     Priority: Medium     Advance Care Planning 3/2/2016: Receipt of ACP document:  Received: Health Care Directive which was witnessed or notarized on 1-25-16.  Document previously scanned on 2-5-16.  Validation form completed and sent to be scanned.  Code Status needs to be updated to reflect choices in most recent ACP document.  Confirmed/documented designated decision maker(s).  Added by Rachel Barber RN Advance Care Planning Liaison with [facility]             Pelvic pain in female 01/14/2016     Priority: Medium     FH: breast cancer      Priority: Medium     Tobacco abuse, in remission      Priority: Medium     Generalized anxiety disorder      Priority: Medium     Diagnosis updated by automated process. Provider to review and confirm.       Attention deficit disorder 05/31/2005     Priority: Medium     Problem list name updated by automated process. Provider to review          Past Medical History:    Past Medical History:   Diagnosis Date     Attention deficit disorder without " mention of hyperactivity 5/31/2005     Dependent personality disorder 1/11/2011     FH: breast cancer      DAFNE (generalised anxiety disorder)      Irritable bowel syndrome with both constipation and diarrhea 8/2/2017     MDD (major depressive disorder)      Migraine, unspecified, without mention of intractable migraine without mention of status migrainosus 3/13/2000     Tobacco abuse, in remission      Unspecified sinusitis (chronic) 3/8/2001       Past Surgical History:    Past Surgical History:   Procedure Laterality Date     ENDOSCOPY UPPER, COLONOSCOPY, COMBINED N/A 9/4/2015    Procedure: COMBINED ENDOSCOPY UPPER, COLONOSCOPY;  Surgeon: Griffin Barrientos DO;  Location: [facility] OR     LAPAROSCOPIC CYSTECTOMY OVARIAN (BENIGN) Right 1/27/2016    Procedure: LAPAROSCOPIC CYSTECTOMY OVARIAN (BENIGN);  Surgeon: Kuldip España MD;  Location: [facility] OR     LAPAROSCOPIC SALPINGO-OOPHORECTOMY Left 1/27/2016    Procedure: LAPAROSCOPIC SALPINGO-OOPHORECTOMY;  Surgeon: Kuldip España MD;  Location: [facility] OR     LAPAROSCOPY DIAGNOSTIC (GYN) Left 1/27/2016    Procedure: LAPAROSCOPY DIAGNOSTIC (GYN);  Surgeon: Kuldip España MD;  Location: [facility] OR     TUBAL LIGATION  2006     wisdom teeth         Family History:    Family History   Problem Relation Age of Onset     Breast Cancer Maternal Grandmother      Hypertension Maternal Grandfather      Hyperlipidemia Maternal Grandfather      DIABETES Paternal Grandmother      Asthma No family hx of        Social History:  Marital Status:   [4]  Social History   Substance Use Topics     Smoking status: Current Every Day Smoker     Packs/day: 0.50     Years: 13.00     Types: Cigarettes     Last attempt to quit: 1/1/2015     Smokeless tobacco: Never Used     Alcohol use 0.0 oz/week      Comment: frequency: rarely        Medications:      doxycycline (VIBRAMYCIN) 100 MG capsule   benzonatate (TESSALON) 100 MG capsule   albuterol (PROAIR HFA) 108 (90 BASE) MCG/ACT Inhaler   docusate sodium  (COLACE) 100 MG capsule   methylphenidate ER (CONCERTA) 54 MG CR tablet   methylphenidate ER (CONCERTA) 54 MG CR tablet   [START ON 4/7/2018] methylphenidate ER (CONCERTA) 54 MG CR tablet   gabapentin (NEURONTIN) 100 MG capsule   traMADol (ULTRAM) 50 MG tablet   citalopram (CELEXA) 40 MG tablet   ranitidine (ZANTAC) 150 MG tablet   traZODone (DESYREL) 50 MG tablet   hydrOXYzine (VISTARIL) 25 MG capsule   ibuprofen (ADVIL,MOTRIN) 800 MG tablet   Acetaminophen (TYLENOL PO)   nicotine (NICODERM CQ) 21 MG/24HR 24 hr patch   nicotine (NICODERM CQ) 14 MG/24HR 24 hr patch   nicotine (NICODERM CQ) 7 MG/24HR 24 hr patch   [DISCONTINUED] VENTOLIN  (90 BASE) MCG/ACT Inhaler         Review of Systems   Constitutional: Positive for appetite change, chills and fatigue. Negative for activity change and fever.   HENT: Positive for congestion and sore throat. Negative for ear discharge, ear pain, postnasal drip, rhinorrhea, sinus pain, sinus pressure, sneezing and trouble swallowing.    Respiratory: Positive for cough. Negative for shortness of breath, wheezing and stridor.    Cardiovascular: Negative for chest pain and leg swelling.   Gastrointestinal: Negative for abdominal pain, diarrhea, nausea and vomiting.   Genitourinary: Negative for dysuria.   Skin: Negative for rash.   Neurological: Negative for weakness.   Psychiatric/Behavioral: Negative.        Physical Exam   BP: 126/70  Pulse: 83  Temp: 98.1  F (36.7  C)  Resp: 16  Weight: 95.3 kg (210 lb)  SpO2: 96 %      Physical Exam   Constitutional: She is oriented to person, place, and time. She appears well-developed and well-nourished. No distress.   HENT:   Head: Normocephalic.   Right Ear: External ear normal.   Mouth/Throat: Oropharynx is clear and moist. No oropharyngeal exudate.   Neck: Normal range of motion. Neck supple.   Cardiovascular: Normal rate, regular rhythm and normal heart sounds.    No murmur heard.  Pulmonary/Chest: Effort normal. No respiratory  distress. She has no wheezes. She has rales.   Abdominal: Soft. She exhibits no distension.   Musculoskeletal: Normal range of motion.   Lymphadenopathy:     She has no cervical adenopathy.   Neurological: She is alert and oriented to person, place, and time. She exhibits normal muscle tone.   Skin: Skin is warm and dry. No rash noted. She is not diaphoretic.   Psychiatric: She has a normal mood and affect. Her behavior is normal.   Nursing note and vitals reviewed.      ED Course     ED Course     Procedures    Results for orders placed or performed during the hospital encounter of 03/25/18   Rapid strep screen   Result Value Ref Range    Specimen Description Throat     Rapid Strep A Screen       NEGATIVE: No Group A streptococcal antigen detected by immunoassay, await culture report.      Strep swab obtained prior to me seeing her.     Assessments & Plan (with Medical Decision Making)     Discussed plan of care. She verbalized understanding. All questions answered.     I have reviewed the nursing notes.    I have reviewed the findings, diagnosis, plan and need for follow up with the patient.  Discharged in stable condition.     New Prescriptions    ALBUTEROL (PROAIR HFA) 108 (90 BASE) MCG/ACT INHALER    Inhale 2 puffs into the lungs every 4 hours as needed for shortness of breath / dyspnea    BENZONATATE (TESSALON) 100 MG CAPSULE    Take 1 capsule (100 mg) by mouth 3 times daily as needed for cough    DOXYCYCLINE (VIBRAMYCIN) 100 MG CAPSULE    Take 1 capsule (100 mg) by mouth 2 times daily for 10 days       Final diagnoses:   Acute bronchitis, unspecified organism     Take antibiotics as ordered.   Eat a yogurt daily while taking antibiotics.   Take Tessalon as needed as directed for cough.   Use Albuterol inhaler as needed as directed for cough or wheezing.   Increase fluid intake.   Follow up with PCP with any increase in symptoms or concerns.   Return to urgent care or emergency department with any increase in  symptoms or concerns.     LIZANDRO Aranda  3/25/2018  3:30 PM  [facility]       Ivette Polk NP  03/25/18 [id number]     no
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
## 2025-06-29 NOTE — PATIENT PROFILE ADULT - FALL HARM RISK - HARM RISK INTERVENTIONS
Assistance with ambulation/Assistance OOB with selected safe patient handling equipment/Communicate Risk of Fall with Harm to all staff/Monitor for mental status changes/Monitor gait and stability/Reinforce activity limits and safety measures with patient and family/Tailored Fall Risk Interventions/Toileting schedule using arm’s reach rule for commode and bathroom/Use of alarms - bed, chair and/or voice tab/Visual Cue: Yellow wristband and red socks/Bed in lowest position, wheels locked, appropriate side rails in place/Call bell, personal items and telephone in reach/Instruct patient to call for assistance before getting out of bed or chair/Non-slip footwear when patient is out of bed/[place] to call system/Physically safe environment - no spills, clutter or unnecessary equipment/Purposeful Proactive Rounding/Room/bathroom lighting operational, light cord in reach

## 2025-06-29 NOTE — DISCHARGE NOTE PROVIDER - CARE PROVIDER_API CALL
Dru Herrera  Urology  [address]  [place] [address]  Phone: [phone]  Fax: [phone]  Scheduled Appointment: 07/01/2025

## 2025-06-29 NOTE — DISCHARGE NOTE NURSING/CASE MANAGEMENT/SOCIAL WORK - NSDCPEFALRISK_GEN_ALL_CORE
For information on Fall & Injury Prevention, visit: [email or web address] OR  [email or web address] OR  https://www.cdc.gov/steadi/patient.html Positive

## 2025-06-30 RX ORDER — CEFPODOXIME PROXETIL 200 MG/1
1 TABLET, FILM COATED ORAL
Qty: 12 | Refills: 0
Start: 2025-06-30 | End: 2025-07-05

## 2025-07-03 DIAGNOSIS — Z79.899 OTHER LONG TERM (CURRENT) DRUG THERAPY: ICD-10-CM

## 2025-07-03 DIAGNOSIS — I10 ESSENTIAL (PRIMARY) HYPERTENSION: ICD-10-CM

## 2025-07-03 DIAGNOSIS — R31.9 HEMATURIA, UNSPECIFIED: ICD-10-CM

## 2025-07-03 DIAGNOSIS — M48.54XA COLLAPSED VERTEBRA, NOT ELSEWHERE CLASSIFIED, THORACIC REGION, INITIAL ENCOUNTER FOR FRACTURE: ICD-10-CM

## 2025-07-03 DIAGNOSIS — D50.9 IRON DEFICIENCY ANEMIA, UNSPECIFIED: ICD-10-CM

## 2025-07-03 DIAGNOSIS — N32.9 BLADDER DISORDER, UNSPECIFIED: ICD-10-CM

## 2025-07-03 DIAGNOSIS — E78.1 PURE HYPERGLYCERIDEMIA: ICD-10-CM

## 2025-07-03 DIAGNOSIS — Z87.891 PERSONAL HISTORY OF NICOTINE DEPENDENCE: ICD-10-CM

## 2025-07-03 DIAGNOSIS — F10.130 ALCOHOL ABUSE WITH WITHDRAWAL, UNCOMPLICATED: ICD-10-CM

## 2025-07-03 DIAGNOSIS — A41.9 SEPSIS, UNSPECIFIED ORGANISM: ICD-10-CM

## 2025-07-03 DIAGNOSIS — E87.6 HYPOKALEMIA: ICD-10-CM

## 2025-07-03 DIAGNOSIS — R65.20 SEVERE SEPSIS WITHOUT SEPTIC SHOCK: ICD-10-CM

## 2025-07-03 DIAGNOSIS — R00.0 TACHYCARDIA, UNSPECIFIED: ICD-10-CM

## 2025-07-09 ENCOUNTER — APPOINTMENT (OUTPATIENT)
Dept: UROLOGY | Facility: CLINIC | Age: 45
End: 2025-07-09